# Patient Record
Sex: FEMALE | Race: WHITE | Employment: OTHER | URBAN - METROPOLITAN AREA
[De-identification: names, ages, dates, MRNs, and addresses within clinical notes are randomized per-mention and may not be internally consistent; named-entity substitution may affect disease eponyms.]

---

## 2019-08-28 ENCOUNTER — HOSPITAL ENCOUNTER (OUTPATIENT)
Age: 59
Setting detail: SPECIMEN
Discharge: HOME | End: 2019-08-28
Payer: MEDICARE

## 2019-08-28 LAB
ABSOLUTE EOS #: <0.03 K/UL (ref 0–0.44)
ABSOLUTE IMMATURE GRANULOCYTE: 0.03 K/UL (ref 0–0.3)
ABSOLUTE LYMPH #: 1.56 K/UL (ref 1.1–3.7)
ABSOLUTE MONO #: 0.52 K/UL (ref 0.1–1.2)
ALBUMIN SERPL-MCNC: 4.2 G/DL (ref 3.5–5.2)
ALBUMIN/GLOBULIN RATIO: 1.4 (ref 1–2.5)
ALP BLD-CCNC: 118 U/L (ref 35–104)
ALT SERPL-CCNC: 153 U/L (ref 5–33)
ANION GAP SERPL CALCULATED.3IONS-SCNC: 13 MMOL/L (ref 9–17)
AST SERPL-CCNC: 112 U/L
BASOPHILS # BLD: 1 % (ref 0–2)
BASOPHILS ABSOLUTE: 0.07 K/UL (ref 0–0.2)
BILIRUB SERPL-MCNC: 0.32 MG/DL (ref 0.3–1.2)
BUN BLDV-MCNC: 13 MG/DL (ref 6–20)
BUN/CREAT BLD: ABNORMAL (ref 9–20)
CALCIUM SERPL-MCNC: 8.9 MG/DL (ref 8.6–10.4)
CHLORIDE BLD-SCNC: 104 MMOL/L (ref 98–107)
CO2: 23 MMOL/L (ref 20–31)
CREAT SERPL-MCNC: 0.81 MG/DL (ref 0.5–0.9)
DIFFERENTIAL TYPE: ABNORMAL
EOSINOPHILS RELATIVE PERCENT: 0 % (ref 1–4)
GFR AFRICAN AMERICAN: >60 ML/MIN
GFR NON-AFRICAN AMERICAN: >60 ML/MIN
GFR SERPL CREATININE-BSD FRML MDRD: ABNORMAL ML/MIN/{1.73_M2}
GFR SERPL CREATININE-BSD FRML MDRD: ABNORMAL ML/MIN/{1.73_M2}
GLUCOSE BLD-MCNC: 107 MG/DL (ref 70–99)
HAV IGM SER IA-ACNC: NONREACTIVE
HBV SURFACE AB TITR SER: <3.5 MIU/ML
HCT VFR BLD CALC: 38.1 % (ref 36.3–47.1)
HEMOGLOBIN: 12.1 G/DL (ref 11.9–15.1)
HEPATITIS B CORE IGM ANTIBODY: NONREACTIVE
HEPATITIS B SURFACE ANTIGEN: NONREACTIVE
HEPATITIS C ANTIBODY: REACTIVE
IMMATURE GRANULOCYTES: 1 %
INR BLD: 0.9
LYMPHOCYTES # BLD: 27 % (ref 24–43)
MCH RBC QN AUTO: 29.6 PG (ref 25.2–33.5)
MCHC RBC AUTO-ENTMCNC: 31.8 G/DL (ref 28.4–34.8)
MCV RBC AUTO: 93.2 FL (ref 82.6–102.9)
MONOCYTES # BLD: 9 % (ref 3–12)
NRBC AUTOMATED: 0 PER 100 WBC
PDW BLD-RTO: 12.6 % (ref 11.8–14.4)
PLATELET # BLD: 288 K/UL (ref 138–453)
PLATELET ESTIMATE: ABNORMAL
PMV BLD AUTO: 10.4 FL (ref 8.1–13.5)
POTASSIUM SERPL-SCNC: 4.8 MMOL/L (ref 3.7–5.3)
PROTHROMBIN TIME: 9.9 SEC (ref 9–12)
RBC # BLD: 4.09 M/UL (ref 3.95–5.11)
RBC # BLD: ABNORMAL 10*6/UL
SEG NEUTROPHILS: 62 % (ref 36–65)
SEGMENTED NEUTROPHILS ABSOLUTE COUNT: 3.57 K/UL (ref 1.5–8.1)
SODIUM BLD-SCNC: 140 MMOL/L (ref 135–144)
TOTAL PROTEIN: 7.1 G/DL (ref 6.4–8.3)
TSH SERPL DL<=0.05 MIU/L-ACNC: 1.83 MIU/L (ref 0.3–5)
WBC # BLD: 5.8 K/UL (ref 3.5–11.3)
WBC # BLD: ABNORMAL 10*3/UL

## 2019-08-30 LAB
DIRECT EXAM: ABNORMAL
DIRECT EXAM: ABNORMAL
Lab: ABNORMAL
SPECIMEN DESCRIPTION: ABNORMAL

## 2019-09-01 LAB
ALANINE AMINOTRANSFERASE, FIBROMETER: 165 U/L (ref 5–40)
ALPHA-2-MACROGLOBULIN, FIBROMETER: 234 MG/DL (ref 131–293)
ASPARTATE AMINOTRANSFERASE, FIBROMETER: 117 U/L (ref 9–40)
CIRRHOMETER PATIENT SCORE: 0
EER FIBROMETER REPORT: ABNORMAL
FIBROMETER INTERPRETATION: ABNORMAL
FIBROMETER PATIENT SCORE: 0.43
FIBROMETER PLATELET COUNT: 288
FIBROMETER PROTHROMBIN INDEX: 113 % (ref 90–120)
FIBROSIS METAVIR CLASSIFICATION: ABNORMAL
GAMMA GLUTAMYL TRANSFERASE, FIBROMETER: 66 U/L (ref 7–33)
INFLAMETER METAVIR CLASSIFICATION: ABNORMAL
INFLAMETER PATIENT SCORE: 0.5
UREA NITROGEN, FIBROMETER: 12 MG/DL (ref 7–20)

## 2019-09-03 LAB
HCV QUANTITATIVE: NORMAL
HEPATITIS C GENOTYPE: NORMAL

## 2019-10-22 ENCOUNTER — HOSPITAL ENCOUNTER (OUTPATIENT)
Dept: MRI IMAGING | Age: 59
Discharge: HOME | End: 2019-10-22
Payer: MEDICARE

## 2019-10-22 DIAGNOSIS — M51.26 DISPLACEMENT OF LUMBAR INTERVERTEBRAL DISC WITHOUT MYELOPATHY: ICD-10-CM

## 2019-10-22 PROCEDURE — 72148 MRI LUMBAR SPINE W/O DYE: CPT

## 2019-11-16 ENCOUNTER — APPOINTMENT (OUTPATIENT)
Dept: CT IMAGING | Age: 59
End: 2019-11-16
Payer: MEDICARE

## 2019-11-16 ENCOUNTER — HOSPITAL ENCOUNTER (EMERGENCY)
Age: 59
Discharge: HOME | End: 2019-11-16
Payer: MEDICARE

## 2019-11-16 VITALS
TEMPERATURE: 98.7 F | DIASTOLIC BLOOD PRESSURE: 69 MMHG | WEIGHT: 293 LBS | HEIGHT: 68 IN | BODY MASS INDEX: 44.41 KG/M2 | SYSTOLIC BLOOD PRESSURE: 148 MMHG | OXYGEN SATURATION: 99 % | HEART RATE: 94 BPM | RESPIRATION RATE: 20 BRPM

## 2019-11-16 DIAGNOSIS — G89.29 ACUTE EXACERBATION OF CHRONIC LOW BACK PAIN: Primary | ICD-10-CM

## 2019-11-16 DIAGNOSIS — M51.36 LUMBAR DEGENERATIVE DISC DISEASE: ICD-10-CM

## 2019-11-16 DIAGNOSIS — M54.50 ACUTE EXACERBATION OF CHRONIC LOW BACK PAIN: Primary | ICD-10-CM

## 2019-11-16 PROCEDURE — 99283 EMERGENCY DEPT VISIT LOW MDM: CPT

## 2019-11-16 PROCEDURE — 96372 THER/PROPH/DIAG INJ SC/IM: CPT

## 2019-11-16 PROCEDURE — 6370000000 HC RX 637 (ALT 250 FOR IP): Performed by: PHYSICIAN ASSISTANT

## 2019-11-16 PROCEDURE — 72131 CT LUMBAR SPINE W/O DYE: CPT

## 2019-11-16 PROCEDURE — 6360000002 HC RX W HCPCS: Performed by: PHYSICIAN ASSISTANT

## 2019-11-16 RX ORDER — LIDOCAINE 4 G/G
1 PATCH TOPICAL DAILY
Status: DISCONTINUED | OUTPATIENT
Start: 2019-11-16 | End: 2019-11-16 | Stop reason: HOSPADM

## 2019-11-16 RX ORDER — LIDOCAINE 50 MG/G
1 PATCH TOPICAL DAILY
Qty: 10 PATCH | Refills: 0 | Status: SHIPPED | OUTPATIENT
Start: 2019-11-16 | End: 2019-11-26

## 2019-11-16 RX ORDER — METHYLPREDNISOLONE 4 MG/1
TABLET ORAL
Qty: 1 KIT | Refills: 0 | Status: SHIPPED | OUTPATIENT
Start: 2019-11-16 | End: 2019-11-22

## 2019-11-16 RX ORDER — CYCLOBENZAPRINE HCL 10 MG
10 TABLET ORAL 3 TIMES DAILY PRN
Qty: 15 TABLET | Refills: 0 | Status: SHIPPED | OUTPATIENT
Start: 2019-11-16 | End: 2019-11-21

## 2019-11-16 RX ORDER — KETOROLAC TROMETHAMINE 30 MG/ML
30 INJECTION, SOLUTION INTRAMUSCULAR; INTRAVENOUS ONCE
Status: COMPLETED | OUTPATIENT
Start: 2019-11-16 | End: 2019-11-16

## 2019-11-16 RX ORDER — NAPROXEN 500 MG/1
500 TABLET ORAL 2 TIMES DAILY WITH MEALS
Qty: 60 TABLET | Refills: 3 | Status: SHIPPED | OUTPATIENT
Start: 2019-11-16 | End: 2019-12-29

## 2019-11-16 RX ORDER — CYCLOBENZAPRINE HCL 10 MG
10 TABLET ORAL ONCE
Status: COMPLETED | OUTPATIENT
Start: 2019-11-16 | End: 2019-11-16

## 2019-11-16 RX ORDER — METHYLPREDNISOLONE SODIUM SUCCINATE 125 MG/2ML
80 INJECTION, POWDER, LYOPHILIZED, FOR SOLUTION INTRAMUSCULAR; INTRAVENOUS ONCE
Status: COMPLETED | OUTPATIENT
Start: 2019-11-16 | End: 2019-11-16

## 2019-11-16 RX ADMIN — CYCLOBENZAPRINE 10 MG: 10 TABLET, FILM COATED ORAL at 19:47

## 2019-11-16 RX ADMIN — KETOROLAC TROMETHAMINE 30 MG: 30 INJECTION, SOLUTION INTRAMUSCULAR at 19:48

## 2019-11-16 RX ADMIN — METHYLPREDNISOLONE SODIUM SUCCINATE 80 MG: 125 INJECTION, POWDER, FOR SOLUTION INTRAMUSCULAR; INTRAVENOUS at 19:47

## 2019-11-16 ASSESSMENT — ENCOUNTER SYMPTOMS
BACK PAIN: 1
CONSTIPATION: 0
ABDOMINAL PAIN: 0
DIARRHEA: 0
COLOR CHANGE: 0
NAUSEA: 0
VOMITING: 0
SHORTNESS OF BREATH: 0

## 2019-11-16 ASSESSMENT — PAIN DESCRIPTION - LOCATION: LOCATION: BACK;LEG

## 2019-11-16 ASSESSMENT — PAIN SCALES - GENERAL
PAINLEVEL_OUTOF10: 4
PAINLEVEL_OUTOF10: 5

## 2019-11-16 ASSESSMENT — PAIN DESCRIPTION - PAIN TYPE: TYPE: CHRONIC PAIN

## 2019-11-16 ASSESSMENT — PAIN DESCRIPTION - FREQUENCY: FREQUENCY: INTERMITTENT

## 2019-11-20 ENCOUNTER — HOSPITAL ENCOUNTER (OUTPATIENT)
Age: 59
Setting detail: SPECIMEN
Discharge: HOME | End: 2019-11-20
Payer: MEDICARE

## 2019-11-21 LAB
CHLAMYDIA BY THIN PREP: NEGATIVE
N. GONORRHOEAE DNA, THIN PREP: NEGATIVE
SPECIMEN DESCRIPTION: NORMAL

## 2019-11-22 LAB
HPV SAMPLE: ABNORMAL
HPV, GENOTYPE 16: NOT DETECTED
HPV, GENOTYPE 18: NOT DETECTED
HPV, HIGH RISK OTHER: DETECTED
HPV, INTERPRETATION: ABNORMAL
SPECIMEN DESCRIPTION: ABNORMAL

## 2019-11-25 ENCOUNTER — HOSPITAL ENCOUNTER (EMERGENCY)
Age: 59
Discharge: HOME | End: 2019-11-25
Payer: MEDICARE

## 2019-11-25 VITALS
WEIGHT: 293 LBS | DIASTOLIC BLOOD PRESSURE: 81 MMHG | RESPIRATION RATE: 20 BRPM | HEART RATE: 89 BPM | OXYGEN SATURATION: 98 % | SYSTOLIC BLOOD PRESSURE: 154 MMHG | BODY MASS INDEX: 44.41 KG/M2 | HEIGHT: 68 IN | TEMPERATURE: 96.2 F

## 2019-11-25 DIAGNOSIS — K08.89 ODONTALGIA: Primary | ICD-10-CM

## 2019-11-25 DIAGNOSIS — K02.9 DENTAL DECAY: ICD-10-CM

## 2019-11-25 DIAGNOSIS — K02.9 DENTAL CARIES: ICD-10-CM

## 2019-11-25 PROCEDURE — 99282 EMERGENCY DEPT VISIT SF MDM: CPT

## 2019-11-25 PROCEDURE — 6370000000 HC RX 637 (ALT 250 FOR IP): Performed by: EMERGENCY MEDICINE

## 2019-11-25 RX ORDER — IBUPROFEN 200 MG
600 TABLET ORAL ONCE
Status: COMPLETED | OUTPATIENT
Start: 2019-11-25 | End: 2019-11-25

## 2019-11-25 RX ORDER — LIDOCAINE HYDROCHLORIDE 20 MG/ML
5 SOLUTION OROPHARYNGEAL ONCE
Status: COMPLETED | OUTPATIENT
Start: 2019-11-25 | End: 2019-11-25

## 2019-11-25 RX ORDER — IBUPROFEN 600 MG/1
600 TABLET ORAL 3 TIMES DAILY PRN
Qty: 30 TABLET | Refills: 0 | Status: ON HOLD | OUTPATIENT
Start: 2019-11-25 | End: 2020-01-01

## 2019-11-25 RX ORDER — LIDOCAINE HYDROCHLORIDE 20 MG/ML
5 SOLUTION OROPHARYNGEAL
Qty: 100 ML | Refills: 0 | Status: ON HOLD | OUTPATIENT
Start: 2019-11-25 | End: 2020-01-03 | Stop reason: HOSPADM

## 2019-11-25 RX ORDER — PENICILLIN V POTASSIUM 500 MG/1
500 TABLET ORAL 4 TIMES DAILY
Qty: 28 TABLET | Refills: 0 | Status: SHIPPED | OUTPATIENT
Start: 2019-11-25 | End: 2019-12-02

## 2019-11-25 RX ADMIN — Medication 600 MG: at 11:50

## 2019-11-25 RX ADMIN — LIDOCAINE HYDROCHLORIDE 5 ML: 20 SOLUTION ORAL; TOPICAL at 11:50

## 2019-11-25 ASSESSMENT — PAIN DESCRIPTION - ORIENTATION: ORIENTATION: ANTERIOR;UPPER

## 2019-11-25 ASSESSMENT — ENCOUNTER SYMPTOMS
SORE THROAT: 0
RHINORRHEA: 0
DIARRHEA: 0
BACK PAIN: 0
ABDOMINAL PAIN: 0
VOMITING: 0
EYE DISCHARGE: 0
COUGH: 0
EYE PAIN: 0
WHEEZING: 0
NAUSEA: 0
SHORTNESS OF BREATH: 0

## 2019-11-25 ASSESSMENT — PAIN SCALES - GENERAL
PAINLEVEL_OUTOF10: 6
PAINLEVEL_OUTOF10: 6

## 2019-11-25 ASSESSMENT — PAIN DESCRIPTION - FREQUENCY: FREQUENCY: INTERMITTENT

## 2019-11-25 ASSESSMENT — PAIN DESCRIPTION - PAIN TYPE: TYPE: ACUTE PAIN

## 2019-11-25 ASSESSMENT — PAIN DESCRIPTION - LOCATION: LOCATION: TEETH

## 2019-11-25 ASSESSMENT — PAIN DESCRIPTION - DESCRIPTORS: DESCRIPTORS: SHOOTING;SHARP

## 2019-11-25 ASSESSMENT — PAIN DESCRIPTION - ONSET: ONSET: SUDDEN

## 2019-11-27 LAB — CYTOLOGY REPORT: NORMAL

## 2019-12-29 ENCOUNTER — APPOINTMENT (OUTPATIENT)
Dept: GENERAL RADIOLOGY | Age: 59
DRG: 750 | End: 2019-12-29
Payer: MEDICARE

## 2019-12-29 ENCOUNTER — HOSPITAL ENCOUNTER (EMERGENCY)
Age: 59
Discharge: HOME | DRG: 750 | End: 2019-12-30
Attending: EMERGENCY MEDICINE
Payer: MEDICARE

## 2019-12-29 LAB
ALBUMIN SERPL-MCNC: 4.1 G/DL (ref 3.5–5.1)
ALP BLD-CCNC: 113 U/L (ref 38–126)
ALT SERPL-CCNC: 109 U/L (ref 11–66)
AMPHETAMINE+METHAMPHETAMINE URINE SCREEN: NEGATIVE
ANION GAP SERPL CALCULATED.3IONS-SCNC: 13 MEQ/L (ref 8–16)
AST SERPL-CCNC: 57 U/L (ref 5–40)
BACTERIA: ABNORMAL /HPF
BARBITURATE QUANTITATIVE URINE: NEGATIVE
BASOPHILS # BLD: 0.9 %
BASOPHILS ABSOLUTE: 0.1 THOU/MM3 (ref 0–0.1)
BENZODIAZEPINE QUANTITATIVE URINE: NEGATIVE
BILIRUB SERPL-MCNC: 0.4 MG/DL (ref 0.3–1.2)
BILIRUBIN DIRECT: < 0.2 MG/DL (ref 0–0.3)
BILIRUBIN URINE: ABNORMAL
BLOOD, URINE: ABNORMAL
BUN BLDV-MCNC: 30 MG/DL (ref 7–22)
CALCIUM SERPL-MCNC: 9.3 MG/DL (ref 8.5–10.5)
CANNABINOID QUANTITATIVE URINE: NEGATIVE
CASTS 2: ABNORMAL /LPF
CASTS UA: ABNORMAL /LPF
CHARACTER, URINE: CLEAR
CHLORIDE BLD-SCNC: 102 MEQ/L (ref 98–111)
CO2: 21 MEQ/L (ref 23–33)
COCAINE METABOLITE QUANTITATIVE URINE: NEGATIVE
COLOR: ABNORMAL
CREAT SERPL-MCNC: 1 MG/DL (ref 0.4–1.2)
CRYSTALS, UA: ABNORMAL
EKG ATRIAL RATE: 109 BPM
EKG P AXIS: 2 DEGREES
EKG P-R INTERVAL: 146 MS
EKG Q-T INTERVAL: 340 MS
EKG QRS DURATION: 80 MS
EKG QTC CALCULATION (BAZETT): 457 MS
EKG R AXIS: 38 DEGREES
EKG T AXIS: 50 DEGREES
EKG VENTRICULAR RATE: 109 BPM
EOSINOPHIL # BLD: 0.1 %
EOSINOPHILS ABSOLUTE: 0 THOU/MM3 (ref 0–0.4)
EPITHELIAL CELLS, UA: ABNORMAL /HPF
ERYTHROCYTE [DISTWIDTH] IN BLOOD BY AUTOMATED COUNT: 12.2 % (ref 11.5–14.5)
ERYTHROCYTE [DISTWIDTH] IN BLOOD BY AUTOMATED COUNT: 38.7 FL (ref 35–45)
ETHYL ALCOHOL, SERUM: < 0.01 %
GFR SERPL CREATININE-BSD FRML MDRD: 57 ML/MIN/1.73M2
GLUCOSE BLD-MCNC: 157 MG/DL (ref 70–108)
GLUCOSE URINE: NEGATIVE MG/DL
HCT VFR BLD CALC: 41.1 % (ref 37–47)
HEMOGLOBIN: 14.1 GM/DL (ref 12–16)
ICTOTEST: NEGATIVE
IMMATURE GRANS (ABS): 0.04 THOU/MM3 (ref 0–0.07)
IMMATURE GRANULOCYTES: 0.4 %
KETONES, URINE: NEGATIVE
LEUKOCYTE ESTERASE, URINE: ABNORMAL
LIPASE: 25.6 U/L (ref 5.6–51.3)
LYMPHOCYTES # BLD: 33.4 %
LYMPHOCYTES ABSOLUTE: 3.6 THOU/MM3 (ref 1–4.8)
MAGNESIUM: 1.9 MG/DL (ref 1.6–2.4)
MCH RBC QN AUTO: 29.6 PG (ref 26–33)
MCHC RBC AUTO-ENTMCNC: 34.3 GM/DL (ref 32.2–35.5)
MCV RBC AUTO: 86.3 FL (ref 81–99)
MISCELLANEOUS 2: ABNORMAL
MONOCYTES # BLD: 8.3 %
MONOCYTES ABSOLUTE: 0.9 THOU/MM3 (ref 0.4–1.3)
NITRITE, URINE: NEGATIVE
NUCLEATED RED BLOOD CELLS: 0 /100 WBC
OPIATES, URINE: NEGATIVE
OSMOLALITY CALCULATION: 281.4 MOSMOL/KG (ref 275–300)
OXYCODONE: NEGATIVE
PH UA: 5 (ref 5–9)
PHENCYCLIDINE QUANTITATIVE URINE: NEGATIVE
PLATELET # BLD: 345 THOU/MM3 (ref 130–400)
PMV BLD AUTO: 9.8 FL (ref 9.4–12.4)
POTASSIUM SERPL-SCNC: 4 MEQ/L (ref 3.5–5.2)
PROTEIN UA: NEGATIVE
RBC # BLD: 4.76 MILL/MM3 (ref 4.2–5.4)
RBC URINE: ABNORMAL /HPF
RENAL EPITHELIAL, UA: ABNORMAL
SEG NEUTROPHILS: 56.9 %
SEGMENTED NEUTROPHILS ABSOLUTE COUNT: 6.2 THOU/MM3 (ref 1.8–7.7)
SODIUM BLD-SCNC: 136 MEQ/L (ref 135–145)
SPECIFIC GRAVITY, URINE: 1.03 (ref 1–1.03)
TOTAL PROTEIN: 8.1 G/DL (ref 6.1–8)
TROPONIN T: < 0.01 NG/ML
TROPONIN T: < 0.01 NG/ML
UROBILINOGEN, URINE: 1 EU/DL (ref 0–1)
WBC # BLD: 10.9 THOU/MM3 (ref 4.8–10.8)
WBC UA: ABNORMAL /HPF
YEAST: ABNORMAL

## 2019-12-29 PROCEDURE — 96374 THER/PROPH/DIAG INJ IV PUSH: CPT

## 2019-12-29 PROCEDURE — 6370000000 HC RX 637 (ALT 250 FOR IP): Performed by: EMERGENCY MEDICINE

## 2019-12-29 PROCEDURE — 87086 URINE CULTURE/COLONY COUNT: CPT

## 2019-12-29 PROCEDURE — 80053 COMPREHEN METABOLIC PANEL: CPT

## 2019-12-29 PROCEDURE — 82248 BILIRUBIN DIRECT: CPT

## 2019-12-29 PROCEDURE — 84484 ASSAY OF TROPONIN QUANT: CPT

## 2019-12-29 PROCEDURE — 6360000002 HC RX W HCPCS: Performed by: EMERGENCY MEDICINE

## 2019-12-29 PROCEDURE — 85025 COMPLETE CBC W/AUTO DIFF WBC: CPT

## 2019-12-29 PROCEDURE — 93005 ELECTROCARDIOGRAM TRACING: CPT | Performed by: EMERGENCY MEDICINE

## 2019-12-29 PROCEDURE — 36415 COLL VENOUS BLD VENIPUNCTURE: CPT

## 2019-12-29 PROCEDURE — 71045 X-RAY EXAM CHEST 1 VIEW: CPT

## 2019-12-29 PROCEDURE — 83735 ASSAY OF MAGNESIUM: CPT

## 2019-12-29 PROCEDURE — G0480 DRUG TEST DEF 1-7 CLASSES: HCPCS

## 2019-12-29 PROCEDURE — 80307 DRUG TEST PRSMV CHEM ANLYZR: CPT

## 2019-12-29 PROCEDURE — 99285 EMERGENCY DEPT VISIT HI MDM: CPT

## 2019-12-29 PROCEDURE — 83690 ASSAY OF LIPASE: CPT

## 2019-12-29 PROCEDURE — 81001 URINALYSIS AUTO W/SCOPE: CPT

## 2019-12-29 RX ORDER — PANTOPRAZOLE SODIUM 40 MG/1
40 TABLET, DELAYED RELEASE ORAL ONCE
Status: COMPLETED | OUTPATIENT
Start: 2019-12-29 | End: 2019-12-29

## 2019-12-29 RX ORDER — HYDROXYZINE HYDROCHLORIDE 50 MG/ML
50 INJECTION, SOLUTION INTRAMUSCULAR ONCE
Status: COMPLETED | OUTPATIENT
Start: 2019-12-29 | End: 2019-12-29

## 2019-12-29 RX ORDER — ONDANSETRON 4 MG/1
4 TABLET, ORALLY DISINTEGRATING ORAL ONCE
Status: COMPLETED | OUTPATIENT
Start: 2019-12-29 | End: 2019-12-29

## 2019-12-29 RX ORDER — GRANULES FOR ORAL 3 G/1
3 POWDER ORAL ONCE
Status: COMPLETED | OUTPATIENT
Start: 2019-12-29 | End: 2019-12-30

## 2019-12-29 RX ADMIN — LIDOCAINE HYDROCHLORIDE: 20 SOLUTION ORAL; TOPICAL at 22:00

## 2019-12-29 RX ADMIN — HYDROXYZINE HYDROCHLORIDE 50 MG: 50 INJECTION, SOLUTION INTRAMUSCULAR at 22:37

## 2019-12-29 RX ADMIN — PANTOPRAZOLE SODIUM 40 MG: 40 TABLET, DELAYED RELEASE ORAL at 22:00

## 2019-12-29 RX ADMIN — ONDANSETRON 4 MG: 4 TABLET, ORALLY DISINTEGRATING ORAL at 22:00

## 2019-12-29 ASSESSMENT — ENCOUNTER SYMPTOMS
CHEST TIGHTNESS: 1
EYE PAIN: 0
EYE ITCHING: 0
SINUS PRESSURE: 0
ABDOMINAL DISTENTION: 0
SORE THROAT: 0
VOICE CHANGE: 0
TROUBLE SWALLOWING: 0
EYE REDNESS: 0
DIARRHEA: 0
WHEEZING: 0
CONSTIPATION: 0
CHOKING: 0
BACK PAIN: 0
RHINORRHEA: 0
BLOOD IN STOOL: 0
NAUSEA: 0
PHOTOPHOBIA: 0
EYE DISCHARGE: 0
COUGH: 0
ABDOMINAL PAIN: 0
SHORTNESS OF BREATH: 0
VOMITING: 0

## 2019-12-29 ASSESSMENT — PAIN SCALES - GENERAL: PAINLEVEL_OUTOF10: 6

## 2019-12-29 ASSESSMENT — PAIN DESCRIPTION - DESCRIPTORS: DESCRIPTORS: PRESSURE

## 2019-12-30 VITALS
WEIGHT: 293 LBS | HEART RATE: 88 BPM | SYSTOLIC BLOOD PRESSURE: 125 MMHG | TEMPERATURE: 97.7 F | RESPIRATION RATE: 18 BRPM | BODY MASS INDEX: 44.41 KG/M2 | OXYGEN SATURATION: 96 % | HEIGHT: 68 IN | DIASTOLIC BLOOD PRESSURE: 59 MMHG

## 2019-12-30 PROCEDURE — 6370000000 HC RX 637 (ALT 250 FOR IP): Performed by: EMERGENCY MEDICINE

## 2019-12-30 RX ORDER — CLOTRIMAZOLE AND BETAMETHASONE DIPROPIONATE 10; .64 MG/G; MG/G
CREAM TOPICAL
Qty: 1 TUBE | Refills: 0 | Status: ON HOLD | OUTPATIENT
Start: 2019-12-30 | End: 2020-01-03 | Stop reason: HOSPADM

## 2019-12-30 RX ORDER — HYDROXYZINE PAMOATE 25 MG/1
25 CAPSULE ORAL 3 TIMES DAILY PRN
Qty: 30 CAPSULE | Refills: 0 | Status: ON HOLD | OUTPATIENT
Start: 2019-12-30 | End: 2020-01-03 | Stop reason: HOSPADM

## 2019-12-30 RX ADMIN — FOSFOMYCIN TROMETHAMINE 1 PACKET: 3 POWDER ORAL at 00:14

## 2019-12-30 NOTE — ED PROVIDER NOTES
[facility]  eMERGENCY dEPARTMENT eNCOUnter          CHIEF COMPLAINT       Chief Complaint   Patient presents with    Chest Pain       Nurses Notes reviewed and I agree except as noted in the HPI. HISTORY OF PRESENT ILLNESS    Devan Richardson is a 64 y.o. female who presents to the Emergency Department for the evaluation of chest pain and trouble breathing. The patient states an onset of 1 hour and states that it came on very suddenly. She reports that she was walking to her car when she felt a sharp pain in her left arm that moved to her chest. She reports that it feels like something is sitting on her chest. Patient does report a history of anxiety. She is a recovering fentanyl addict and alcoholic. She reports that she is currently 6 months sober. She denies any history of heart issues, and denies any fevers, chills, numbness or weakness. The patient denies any other symptoms or relevant history at this time. The HPI was provided by the patient. REVIEW OF SYSTEMS      Review of Systems   Constitutional: Negative for activity change, appetite change, diaphoresis, fatigue and unexpected weight change. HENT: Negative for congestion, ear discharge, ear pain, hearing loss, rhinorrhea, sinus pressure, sore throat, trouble swallowing and voice change. Eyes: Negative for photophobia, pain, discharge, redness and itching. Respiratory: Positive for chest tightness. Negative for cough, choking, shortness of breath and wheezing. Cardiovascular: Positive for chest pain. Negative for palpitations and leg swelling. Gastrointestinal: Negative for abdominal distention, abdominal pain, blood in stool, constipation, diarrhea, nausea and vomiting. Endocrine: Negative for polydipsia, polyphagia and polyuria. Genitourinary: Negative for decreased urine volume, difficulty urinating, dysuria, enuresis, frequency, hematuria and urgency.    Musculoskeletal: Positive for myalgias (left shoulder and arm). Negative for arthralgias, back pain, gait problem, neck pain and neck stiffness. Skin: Negative for pallor and rash. Allergic/Immunologic: Negative for immunocompromised state. Neurological: Negative for dizziness, tremors, seizures, syncope, facial asymmetry, weakness, light-headedness, numbness and headaches. Hematological: Negative for adenopathy. Does not bruise/bleed easily. Psychiatric/Behavioral: Negative for agitation, hallucinations and suicidal ideas. The patient is not nervous/anxious. PAST MEDICAL HISTORY    has a past medical history of Anxiety, Chronic low back pain, Depression, Hypertension, Schizoaffective disorder ([facility] [address] and Schizoaffective disorder ([facility] [address] SURGICAL HISTORY      has a past surgical history that includes back surgery (nov 2013); Tubal ligation; Tonsillectomy; Ankle surgery; and cyst removal.    CURRENT MEDICATIONS       Discharge Medication List as of 12/30/2019 12:11 AM      CONTINUE these medications which have NOT CHANGED    Details   ibuprofen (ADVIL;MOTRIN) 600 MG tablet Take 1 tablet by mouth 3 times daily as needed for Pain, Disp-30 tablet, R-0Print      lidocaine viscous hcl (XYLOCAINE) 2 % SOLN solution Take 5 mLs by mouth every 3 hours as needed for Irritation or Dental Pain, Disp-100 mL, R-0Print      FLUoxetine (PROZAC) 40 MG capsule Take 1 capsule by mouth daily, Disp-30 capsule, R-0      paliperidone (INVEGA) 6 MG ER tablet Take 1 tablet by mouth daily, Disp-30 tablet, R-0      amLODIPine (NORVASC) 5 MG tablet Take 1 tablet by mouth daily, Disp-30 tablet, R-0             ALLERGIES     is allergic to codeine and corn starch. FAMILY HISTORY     She indicated that the status of her mother is unknown. She indicated that the status of her father is unknown.   family history includes Diabetes in her mother; Heart Disease in her father. SOCIAL HISTORY    reports that she has been smoking e-cigarettes.  She has been smoking about 0.00 packs

## 2019-12-30 NOTE — ED NOTES
Pt resting in bed on her left side. VS reassessed. Call light in reach. Respirations regular. Pt states vistaril helped.  Will continue to monitor      Kai Naranjo RN  12/29/19 [id number]

## 2019-12-30 NOTE — ED TRIAGE NOTES
Pt comes to ED with c/o chest pain. Pt states this started an hour ago. Pt states that it feels like pressure. Pt states it happened after an AA meeting. Pt has a hx of anxiety. Pt states it is hard to breathe. Pt is alert and pt states it feels different than normal. Pt states she felt pain gong down her left arm.

## 2019-12-31 LAB
ORGANISM: ABNORMAL
URINE CULTURE REFLEX: ABNORMAL

## 2020-01-01 ENCOUNTER — APPOINTMENT (OUTPATIENT)
Dept: CT IMAGING | Age: 60
DRG: 750 | End: 2020-01-01
Payer: MEDICARE

## 2020-01-01 ENCOUNTER — HOSPITAL ENCOUNTER (INPATIENT)
Age: 60
LOS: 2 days | Discharge: HOME | DRG: 750 | End: 2020-01-03
Attending: EMERGENCY MEDICINE | Admitting: PSYCHIATRY & NEUROLOGY
Payer: MEDICARE

## 2020-01-01 PROBLEM — F33.2 MDD (MAJOR DEPRESSIVE DISORDER), RECURRENT SEVERE, WITHOUT PSYCHOSIS (HCC): Status: ACTIVE | Noted: 2020-01-01

## 2020-01-01 LAB
ACETAMINOPHEN LEVEL: < 5 UG/ML (ref 0–20)
ALBUMIN SERPL-MCNC: 4.1 G/DL (ref 3.5–5.1)
ALP BLD-CCNC: 95 U/L (ref 38–126)
ALT SERPL-CCNC: 95 U/L (ref 11–66)
AMPHETAMINE+METHAMPHETAMINE URINE SCREEN: NEGATIVE
ANION GAP SERPL CALCULATED.3IONS-SCNC: 15 MEQ/L (ref 8–16)
AST SERPL-CCNC: 71 U/L (ref 5–40)
BACTERIA: ABNORMAL /HPF
BARBITURATE QUANTITATIVE URINE: NEGATIVE
BASOPHILS # BLD: 0.8 %
BASOPHILS ABSOLUTE: 0.1 THOU/MM3 (ref 0–0.1)
BENZODIAZEPINE QUANTITATIVE URINE: NEGATIVE
BILIRUB SERPL-MCNC: 0.7 MG/DL (ref 0.3–1.2)
BILIRUBIN DIRECT: < 0.2 MG/DL (ref 0–0.3)
BILIRUBIN URINE: NEGATIVE
BLOOD, URINE: NEGATIVE
BUN BLDV-MCNC: 27 MG/DL (ref 7–22)
CALCIUM SERPL-MCNC: 9.4 MG/DL (ref 8.5–10.5)
CANNABINOID QUANTITATIVE URINE: NEGATIVE
CASTS 2: ABNORMAL /LPF
CASTS UA: ABNORMAL /LPF
CHARACTER, URINE: CLEAR
CHLORIDE BLD-SCNC: 105 MEQ/L (ref 98–111)
CO2: 19 MEQ/L (ref 23–33)
COCAINE METABOLITE QUANTITATIVE URINE: NEGATIVE
COLOR: YELLOW
CREAT SERPL-MCNC: 0.9 MG/DL (ref 0.4–1.2)
CRYSTALS, UA: ABNORMAL
EKG ATRIAL RATE: 88 BPM
EKG P AXIS: 7 DEGREES
EKG P-R INTERVAL: 150 MS
EKG Q-T INTERVAL: 396 MS
EKG QRS DURATION: 88 MS
EKG QTC CALCULATION (BAZETT): 479 MS
EKG R AXIS: 48 DEGREES
EKG T AXIS: 44 DEGREES
EKG VENTRICULAR RATE: 88 BPM
EOSINOPHIL # BLD: 1.1 %
EOSINOPHILS ABSOLUTE: 0.1 THOU/MM3 (ref 0–0.4)
EPITHELIAL CELLS, UA: ABNORMAL /HPF
ERYTHROCYTE [DISTWIDTH] IN BLOOD BY AUTOMATED COUNT: 12.2 % (ref 11.5–14.5)
ERYTHROCYTE [DISTWIDTH] IN BLOOD BY AUTOMATED COUNT: 38.5 FL (ref 35–45)
ETHYL ALCOHOL, SERUM: < 0.01 %
GFR SERPL CREATININE-BSD FRML MDRD: 64 ML/MIN/1.73M2
GLUCOSE BLD-MCNC: 142 MG/DL (ref 70–108)
GLUCOSE URINE: NEGATIVE MG/DL
HCT VFR BLD CALC: 37.9 % (ref 37–47)
HEMOGLOBIN: 12.8 GM/DL (ref 12–16)
IMMATURE GRANS (ABS): 0.04 THOU/MM3 (ref 0–0.07)
IMMATURE GRANULOCYTES: 0.4 %
KETONES, URINE: NEGATIVE
LEUKOCYTE ESTERASE, URINE: ABNORMAL
LIPASE: 16.8 U/L (ref 5.6–51.3)
LYMPHOCYTES # BLD: 20.7 %
LYMPHOCYTES ABSOLUTE: 2.2 THOU/MM3 (ref 1–4.8)
MAGNESIUM: 2.4 MG/DL (ref 1.6–2.4)
MCH RBC QN AUTO: 29 PG (ref 26–33)
MCHC RBC AUTO-ENTMCNC: 33.8 GM/DL (ref 32.2–35.5)
MCV RBC AUTO: 85.9 FL (ref 81–99)
MISCELLANEOUS 2: ABNORMAL
MONOCYTES # BLD: 7.5 %
MONOCYTES ABSOLUTE: 0.8 THOU/MM3 (ref 0.4–1.3)
NITRITE, URINE: NEGATIVE
NUCLEATED RED BLOOD CELLS: 0 /100 WBC
OPIATES, URINE: NEGATIVE
OSMOLALITY CALCULATION: 285.1 MOSMOL/KG (ref 275–300)
OXYCODONE: NEGATIVE
PH UA: 5 (ref 5–9)
PHENCYCLIDINE QUANTITATIVE URINE: NEGATIVE
PLATELET # BLD: 309 THOU/MM3 (ref 130–400)
PMV BLD AUTO: 10 FL (ref 9.4–12.4)
POTASSIUM SERPL-SCNC: 4.3 MEQ/L (ref 3.5–5.2)
PREGNANCY, SERUM: NEGATIVE
PROTEIN UA: NEGATIVE
RBC # BLD: 4.41 MILL/MM3 (ref 4.2–5.4)
RBC URINE: ABNORMAL /HPF
RENAL EPITHELIAL, UA: ABNORMAL
SALICYLATE, SERUM: < 0.3 MG/DL (ref 2–10)
SEG NEUTROPHILS: 69.5 %
SEGMENTED NEUTROPHILS ABSOLUTE COUNT: 7.4 THOU/MM3 (ref 1.8–7.7)
SODIUM BLD-SCNC: 139 MEQ/L (ref 135–145)
SPECIFIC GRAVITY, URINE: 1.01 (ref 1–1.03)
TOTAL PROTEIN: 8.2 G/DL (ref 6.1–8)
TSH SERPL DL<=0.05 MIU/L-ACNC: 3.57 UIU/ML (ref 0.4–4.2)
UROBILINOGEN, URINE: 0.2 EU/DL (ref 0–1)
WBC # BLD: 10.6 THOU/MM3 (ref 4.8–10.8)
WBC UA: ABNORMAL /HPF
YEAST: ABNORMAL

## 2020-01-01 PROCEDURE — G0480 DRUG TEST DEF 1-7 CLASSES: HCPCS

## 2020-01-01 PROCEDURE — 99285 EMERGENCY DEPT VISIT HI MDM: CPT

## 2020-01-01 PROCEDURE — 90792 PSYCH DIAG EVAL W/MED SRVCS: CPT | Performed by: PSYCHIATRY & NEUROLOGY

## 2020-01-01 PROCEDURE — 6370000000 HC RX 637 (ALT 250 FOR IP): Performed by: PHYSICIAN ASSISTANT

## 2020-01-01 PROCEDURE — 83735 ASSAY OF MAGNESIUM: CPT

## 2020-01-01 PROCEDURE — 80307 DRUG TEST PRSMV CHEM ANLYZR: CPT

## 2020-01-01 PROCEDURE — 36415 COLL VENOUS BLD VENIPUNCTURE: CPT

## 2020-01-01 PROCEDURE — 82248 BILIRUBIN DIRECT: CPT

## 2020-01-01 PROCEDURE — 85025 COMPLETE CBC W/AUTO DIFF WBC: CPT

## 2020-01-01 PROCEDURE — 81001 URINALYSIS AUTO W/SCOPE: CPT

## 2020-01-01 PROCEDURE — 84703 CHORIONIC GONADOTROPIN ASSAY: CPT

## 2020-01-01 PROCEDURE — 80053 COMPREHEN METABOLIC PANEL: CPT

## 2020-01-01 PROCEDURE — 93010 ELECTROCARDIOGRAM REPORT: CPT | Performed by: INTERNAL MEDICINE

## 2020-01-01 PROCEDURE — 6370000000 HC RX 637 (ALT 250 FOR IP): Performed by: EMERGENCY MEDICINE

## 2020-01-01 PROCEDURE — 84443 ASSAY THYROID STIM HORMONE: CPT

## 2020-01-01 PROCEDURE — 1240000000 HC EMOTIONAL WELLNESS R&B

## 2020-01-01 PROCEDURE — 6370000000 HC RX 637 (ALT 250 FOR IP): Performed by: PSYCHIATRY & NEUROLOGY

## 2020-01-01 PROCEDURE — 74176 CT ABD & PELVIS W/O CONTRAST: CPT

## 2020-01-01 PROCEDURE — 93005 ELECTROCARDIOGRAM TRACING: CPT | Performed by: EMERGENCY MEDICINE

## 2020-01-01 PROCEDURE — 83690 ASSAY OF LIPASE: CPT

## 2020-01-01 PROCEDURE — 87086 URINE CULTURE/COLONY COUNT: CPT

## 2020-01-01 RX ORDER — NICOTINE 21 MG/24HR
1 PATCH, TRANSDERMAL 24 HOURS TRANSDERMAL DAILY
Status: DISCONTINUED | OUTPATIENT
Start: 2020-01-01 | End: 2020-01-03 | Stop reason: HOSPADM

## 2020-01-01 RX ORDER — IBUPROFEN 400 MG/1
400 TABLET ORAL EVERY 6 HOURS PRN
Status: DISCONTINUED | OUTPATIENT
Start: 2020-01-01 | End: 2020-01-03 | Stop reason: HOSPADM

## 2020-01-01 RX ORDER — QUETIAPINE FUMARATE 100 MG/1
200 TABLET, FILM COATED ORAL NIGHTLY
Status: ON HOLD | COMMUNITY
End: 2020-01-03 | Stop reason: HOSPADM

## 2020-01-01 RX ORDER — HYDROXYZINE HYDROCHLORIDE 25 MG/1
50 TABLET, FILM COATED ORAL 3 TIMES DAILY PRN
Status: DISCONTINUED | OUTPATIENT
Start: 2020-01-01 | End: 2020-01-03 | Stop reason: HOSPADM

## 2020-01-01 RX ORDER — PANTOPRAZOLE SODIUM 40 MG/1
40 TABLET, DELAYED RELEASE ORAL ONCE
Status: COMPLETED | OUTPATIENT
Start: 2020-01-01 | End: 2020-01-01

## 2020-01-01 RX ORDER — LACTULOSE 10 G/15ML
30 SOLUTION ORAL ONCE
Status: COMPLETED | OUTPATIENT
Start: 2020-01-01 | End: 2020-01-01

## 2020-01-01 RX ORDER — CLONIDINE HYDROCHLORIDE 0.3 MG/1
0.3 TABLET ORAL 2 TIMES DAILY
Status: ON HOLD | COMMUNITY
End: 2020-01-03 | Stop reason: HOSPADM

## 2020-01-01 RX ORDER — NAPROXEN 500 MG/1
500 TABLET ORAL 2 TIMES DAILY WITH MEALS
COMMUNITY
End: 2020-02-09

## 2020-01-01 RX ORDER — DULOXETIN HYDROCHLORIDE 60 MG/1
60 CAPSULE, DELAYED RELEASE ORAL DAILY
Status: DISCONTINUED | OUTPATIENT
Start: 2020-01-01 | End: 2020-01-02

## 2020-01-01 RX ORDER — PALIPERIDONE 3 MG/1
6 TABLET, EXTENDED RELEASE ORAL DAILY
Status: DISCONTINUED | OUTPATIENT
Start: 2020-01-01 | End: 2020-01-03

## 2020-01-01 RX ORDER — ALBUTEROL SULFATE 90 UG/1
1 AEROSOL, METERED RESPIRATORY (INHALATION) EVERY 6 HOURS PRN
Status: DISCONTINUED | OUTPATIENT
Start: 2020-01-01 | End: 2020-01-03 | Stop reason: HOSPADM

## 2020-01-01 RX ORDER — QUETIAPINE FUMARATE 100 MG/1
200 TABLET, FILM COATED ORAL NIGHTLY
Status: DISCONTINUED | OUTPATIENT
Start: 2020-01-01 | End: 2020-01-03

## 2020-01-01 RX ORDER — NORTRIPTYLINE HYDROCHLORIDE 25 MG/1
50 CAPSULE ORAL NIGHTLY
Status: DISCONTINUED | OUTPATIENT
Start: 2020-01-01 | End: 2020-01-03

## 2020-01-01 RX ORDER — CHLORPROMAZINE HYDROCHLORIDE 25 MG/1
25 TABLET, FILM COATED ORAL 2 TIMES DAILY
Status: DISCONTINUED | OUTPATIENT
Start: 2020-01-01 | End: 2020-01-03

## 2020-01-01 RX ORDER — AMLODIPINE BESYLATE 5 MG/1
5 TABLET ORAL DAILY
Status: DISCONTINUED | OUTPATIENT
Start: 2020-01-01 | End: 2020-01-02

## 2020-01-01 RX ORDER — HYDROXYZINE 50 MG/1
50 TABLET, FILM COATED ORAL 3 TIMES DAILY PRN
Status: ON HOLD | COMMUNITY
End: 2020-01-03 | Stop reason: HOSPADM

## 2020-01-01 RX ORDER — CHLORPROMAZINE HYDROCHLORIDE 25 MG/1
25 TABLET, FILM COATED ORAL 2 TIMES DAILY
Status: ON HOLD | COMMUNITY
End: 2020-01-03 | Stop reason: HOSPADM

## 2020-01-01 RX ORDER — DULOXETIN HYDROCHLORIDE 60 MG/1
60 CAPSULE, DELAYED RELEASE ORAL DAILY
Status: ON HOLD | COMMUNITY
End: 2020-01-03 | Stop reason: HOSPADM

## 2020-01-01 RX ORDER — ALBUTEROL SULFATE 90 UG/1
1 AEROSOL, METERED RESPIRATORY (INHALATION) EVERY 6 HOURS PRN
COMMUNITY

## 2020-01-01 RX ORDER — ONDANSETRON 4 MG/1
4 TABLET, ORALLY DISINTEGRATING ORAL ONCE
Status: COMPLETED | OUTPATIENT
Start: 2020-01-01 | End: 2020-01-01

## 2020-01-01 RX ORDER — PRAZOSIN HYDROCHLORIDE 1 MG/1
1 CAPSULE ORAL NIGHTLY
Status: DISCONTINUED | OUTPATIENT
Start: 2020-01-01 | End: 2020-01-03 | Stop reason: HOSPADM

## 2020-01-01 RX ORDER — PRAZOSIN HYDROCHLORIDE 1 MG/1
1 CAPSULE ORAL NIGHTLY
Status: ON HOLD | COMMUNITY
End: 2020-01-03 | Stop reason: HOSPADM

## 2020-01-01 RX ORDER — CLONIDINE 0.3 MG/24H
1 PATCH, EXTENDED RELEASE TRANSDERMAL WEEKLY
Status: DISCONTINUED | OUTPATIENT
Start: 2020-01-01 | End: 2020-01-03 | Stop reason: HOSPADM

## 2020-01-01 RX ORDER — MAGNESIUM HYDROXIDE/ALUMINUM HYDROXICE/SIMETHICONE 120; 1200; 1200 MG/30ML; MG/30ML; MG/30ML
30 SUSPENSION ORAL EVERY 6 HOURS PRN
Status: DISCONTINUED | OUTPATIENT
Start: 2020-01-01 | End: 2020-01-03 | Stop reason: HOSPADM

## 2020-01-01 RX ORDER — NORTRIPTYLINE HYDROCHLORIDE 25 MG/1
50 CAPSULE ORAL NIGHTLY
Status: ON HOLD | COMMUNITY
End: 2020-01-03 | Stop reason: HOSPADM

## 2020-01-01 RX ADMIN — ONDANSETRON 4 MG: 4 TABLET, ORALLY DISINTEGRATING ORAL at 05:54

## 2020-01-01 RX ADMIN — LACTULOSE 30 G: 20 SOLUTION ORAL at 07:50

## 2020-01-01 RX ADMIN — NORTRIPTYLINE HYDROCHLORIDE 50 MG: 25 CAPSULE ORAL at 21:24

## 2020-01-01 RX ADMIN — PRAZOSIN HYDROCHLORIDE 1 MG: 1 CAPSULE ORAL at 21:24

## 2020-01-01 RX ADMIN — DULOXETINE HYDROCHLORIDE 60 MG: 60 CAPSULE, DELAYED RELEASE ORAL at 18:00

## 2020-01-01 RX ADMIN — PALIPERIDONE 6 MG: 3 TABLET, EXTENDED RELEASE ORAL at 18:00

## 2020-01-01 RX ADMIN — CHLORPROMAZINE HYDROCHLORIDE 25 MG: 25 TABLET, SUGAR COATED ORAL at 21:24

## 2020-01-01 RX ADMIN — PANTOPRAZOLE SODIUM 40 MG: 40 TABLET, DELAYED RELEASE ORAL at 05:54

## 2020-01-01 RX ADMIN — AMLODIPINE BESYLATE 5 MG: 5 TABLET ORAL at 18:06

## 2020-01-01 RX ADMIN — LIDOCAINE HYDROCHLORIDE: 20 SOLUTION ORAL; TOPICAL at 05:54

## 2020-01-01 RX ADMIN — HYDROXYZINE HYDROCHLORIDE 50 MG: 25 TABLET ORAL at 09:28

## 2020-01-01 RX ADMIN — QUETIAPINE FUMARATE 200 MG: 100 TABLET ORAL at 21:24

## 2020-01-01 RX ADMIN — HYDROXYZINE HYDROCHLORIDE 50 MG: 25 TABLET ORAL at 17:27

## 2020-01-01 ASSESSMENT — SLEEP AND FATIGUE QUESTIONNAIRES
DO YOU HAVE DIFFICULTY SLEEPING: NO
DIFFICULTY STAYING ASLEEP: YES
DIFFICULTY FALLING ASLEEP: YES
DIFFICULTY STAYING ASLEEP: YES
DO YOU USE A SLEEP AID: COMMENT
RESTFUL SLEEP: NO
DIFFICULTY FALLING ASLEEP: YES
SLEEP PATTERN: DIFFICULTY FALLING ASLEEP;NORMAL
DIFFICULTY ARISING: YES
RESTFUL SLEEP: NO
AVERAGE NUMBER OF SLEEP HOURS: 5
DIFFICULTY ARISING: YES
DO YOU USE A SLEEP AID: YES
DO YOU HAVE DIFFICULTY SLEEPING: NO
DO YOU HAVE DIFFICULTY SLEEPING: NO
SLEEP PATTERN: DIFFICULTY ARISING
AVERAGE NUMBER OF SLEEP HOURS: 5
DO YOU USE A SLEEP AID: YES

## 2020-01-01 ASSESSMENT — PAIN DESCRIPTION - PAIN TYPE
TYPE: CHRONIC PAIN

## 2020-01-01 ASSESSMENT — ENCOUNTER SYMPTOMS
DIARRHEA: 0
EYE PAIN: 0
CHEST TIGHTNESS: 0
CONSTIPATION: 0
VOMITING: 0
CHOKING: 0
EYE DISCHARGE: 0
SINUS PRESSURE: 0
COUGH: 0
BLOOD IN STOOL: 0
ABDOMINAL PAIN: 1
RHINORRHEA: 0
WHEEZING: 0
BACK PAIN: 0
SHORTNESS OF BREATH: 0
PHOTOPHOBIA: 0
ABDOMINAL DISTENTION: 0
NAUSEA: 0
EYE ITCHING: 0
SORE THROAT: 0
VOICE CHANGE: 0
EYE REDNESS: 0
TROUBLE SWALLOWING: 0

## 2020-01-01 ASSESSMENT — PAIN DESCRIPTION - FREQUENCY
FREQUENCY: CONTINUOUS

## 2020-01-01 ASSESSMENT — PAIN DESCRIPTION - ORIENTATION
ORIENTATION: LOWER

## 2020-01-01 ASSESSMENT — PAIN SCALES - GENERAL
PAINLEVEL_OUTOF10: 0
PAINLEVEL_OUTOF10: 8
PAINLEVEL_OUTOF10: 8
PAINLEVEL_OUTOF10: 0
PAINLEVEL_OUTOF10: 8
PAINLEVEL_OUTOF10: 10

## 2020-01-01 ASSESSMENT — PAIN DESCRIPTION - ONSET
ONSET: ON-GOING

## 2020-01-01 ASSESSMENT — PAIN DESCRIPTION - LOCATION
LOCATION: BACK

## 2020-01-01 ASSESSMENT — PATIENT HEALTH QUESTIONNAIRE - PHQ9
SUM OF ALL RESPONSES TO PHQ QUESTIONS 1-9: 18
SUM OF ALL RESPONSES TO PHQ QUESTIONS 1-9: 18

## 2020-01-01 ASSESSMENT — PAIN DESCRIPTION - PROGRESSION
CLINICAL_PROGRESSION: NOT CHANGED

## 2020-01-01 ASSESSMENT — PAIN DESCRIPTION - DESCRIPTORS
DESCRIPTORS: CONSTANT

## 2020-01-01 ASSESSMENT — PAIN - FUNCTIONAL ASSESSMENT
PAIN_FUNCTIONAL_ASSESSMENT: PREVENTS OR INTERFERES SOME ACTIVE ACTIVITIES AND ADLS

## 2020-01-01 ASSESSMENT — LIFESTYLE VARIABLES: HISTORY_ALCOHOL_USE: NO

## 2020-01-01 NOTE — ED PROVIDER NOTES
father is unknown.   family history includes Diabetes in her mother; Heart Disease in her father. SOCIAL HISTORY      reports that she has been smoking e-cigarettes. She has been smoking about 0.00 packs per day for the past 1.00 year. She uses smokeless tobacco. She reports previous alcohol use of about 6.0 standard drinks of alcohol per week. She reports previous drug use. PHYSICAL EXAM     INITIAL VITALS:  height is 5' 8\" (1.727 m) and weight is 300 lb (136.1 kg). Her oral temperature is 97.8 °F (36.6 °C). Her blood pressure is 139/74 and her pulse is 68. Her respiration is 14 and oxygen saturation is 94%. Physical Exam  Vitals signs and nursing note reviewed. Constitutional:       General: She is not in acute distress. Appearance: She is well-developed. She is not diaphoretic. HENT:      Head: Normocephalic and atraumatic. Right Ear: External ear normal.      Left Ear: External ear normal.      Nose: Nose normal.      Mouth/Throat:      Pharynx: No oropharyngeal exudate. Eyes:      General: No scleral icterus. Right eye: No discharge. Left eye: No discharge. Conjunctiva/sclera: Conjunctivae normal.      Pupils: Pupils are equal, round, and reactive to light. Neck:      Musculoskeletal: Normal range of motion and neck supple. Thyroid: No thyromegaly. Vascular: No JVD. Trachea: No tracheal deviation. Cardiovascular:      Rate and Rhythm: Normal rate and regular rhythm. Heart sounds: Normal heart sounds, S1 normal and S2 normal. No murmur. No friction rub. No gallop. Pulmonary:      Effort: Pulmonary effort is normal.      Breath sounds: Normal breath sounds. No stridor. No wheezing, rhonchi or rales. Chest:      Chest wall: No tenderness. Abdominal:      General: Bowel sounds are normal. There is no distension. Palpations: Abdomen is soft. There is no mass. Tenderness: There is generalized tenderness.  There is no guarding or rebound. Negative signs include Buckner's sign, Rovsing's sign, McBurney's sign and psoas sign. Hernia: No hernia is present. Musculoskeletal: Normal range of motion. General: No tenderness. Lymphadenopathy:      Cervical: No cervical adenopathy. Skin:     General: Skin is warm and dry. Findings: No bruising, ecchymosis, lesion or rash. Neurological:      Mental Status: She is alert and oriented to person, place, and time. Cranial Nerves: No cranial nerve deficit. Coordination: Coordination normal.      Deep Tendon Reflexes: Reflexes are normal and symmetric. Psychiatric:         Speech: Speech normal.         Behavior: Behavior normal.         Thought Content: Thought content normal.         Judgment: Judgment normal.           DIFFERENTIAL DIAGNOSIS:   Differential diagnosis discussed extensively at bedside with the patient including but not limited to suicidal ideation, schizophrenia, substance-induced mood disorder, possible ingestion. DIAGNOSTIC RESULTS     EKG: All EKG's are interpreted by the Emergency Department Physician who either signs or Co-signs this chart in the absence of a cardiologist.  EKG revealed normal sinus rhythm, normal axis, ventricular rate 88 MD interval 150 QRS duration of 88 QT interval 396 QTC of 479. RADIOLOGY: non-plain film images(s) such as CT, Ultrasound and MRI are read by the radiologist.  CT ABDOMEN PELVIS WO CONTRAST Additional Contrast? None   Final Result   1. Retention of stool compatible with constipation. 2. The visualized aspects of the lung bases are clear. The base of the heart is within appropriate limits. **This report has been created using voice recognition software. It may contain minor errors which are inherent in voice recognition technology. **      Final report electronically signed by Dr. Willian Young on 1/1/2020 6:43 AM            LABS:   Labs Reviewed   BASIC METABOLIC PANEL - Abnormal; Notable for the

## 2020-01-01 NOTE — PROGRESS NOTES
752 Call from . Ready for patient to come up. Called nursing desk and spoke to Carmen Fatima to let them know.

## 2020-01-01 NOTE — H&P
Behavioral Services  Medicare Certification Upon Admission    I certify that this patient's inpatient psychiatric hospital admission is medically necessary for:   X (1) Treatment which could reasonably be expected to improve this patient's condition,      X (2) Or for diagnostic study;     AND     X (2) The inpatient psychiatric services are provided while the individual is under the care of a physician and are included in the individualized plan of care. Estimated length of stay/service 2-10 days    Plan for post-hospital care: Follow up with Eliza Gardner. Electronically signed by Phu Sanders PA-C on 1/1/2020 at 5:57 PM                                     Psychiatry Attending Attestation     I assessed this patient and reviewed the case and plan of care with Phu Sanders PA-C. I have reviewed the above documentation and I agree with the findings and treatment plan with the following updates. Patient is a 51-year-old female with history of schizoaffective disorder presented to the emergency department after making suicidal statements. Patient presented initially to [facility] and was sent back home to [facility] where she was living and was brought back in after she mentioned sending messages that she was going to kill herself tonight. Patient reports feeling down and low for more days than not. Endorses anhedonia. Patient notes her depression has been worsening for last few weeks . Patient could not identify any specific stressor. Patient reports poor sleep and appetite. Patient reports having trouble falling asleep. Patient reports poor concentration and energy. Patient endorses feelings of helplessness, hopelessness and worthlessness. She denies any psychotic symptoms. She denies any auditory or visual hallucinations.    Agree with rest of the assessment and plan as above  Electronically signed by Harmeet Welsh MD on 1/1/20 at 6:45 PM    **This report has been created using voice recognition software. It may contain minor errors which are inherent in voice recognition technology. **

## 2020-01-01 NOTE — PROGRESS NOTES
have the ability to pay for your medications?  unsure  5. Where will you be residing when you leave the hospital? [facility]  6. Will need a return to work slip or FMLA paper completion?  NA      GOALS UPDATE: 3 day  Time frame for Short-Term Goals: daily/ongoing    Latoya Main LPC

## 2020-01-01 NOTE — PROGRESS NOTES
BHI Biopsychosocial Assessment    Current Level of Psychosocial Functioning     Independent XX  Dependent    Minimal Assist     Comments:      Psychosocial High Risk Factors (check all that apply)    Unable to obtain meds   Chronic illness/pain   XX  Substance abuse XX  Lack of Family Support   Financial stress   Isolation   Inadequate Community Resources  Suicide attempt(s)  Not taking medications   Victim of crime   Developmental Delay  Unable to manage personal needs    Age 72 or older   Homeless XX/shelter  No transportation   Readmission within 30 days  Unemployment  Traumatic Event    Psychiatric Advanced Directive: JADE    Family to involve in treatment:JADE    Sexual Orientation:  JADE    Patient Strengths: hopeful about future    Patient Barriers: potentially harmful leisure interests    Opiate education provided: No/unable    Safety plan: JADE    CMHC/MH history: JADE    Plan of Care:  medication management, group/individual therapies, family meetings, psycho -education, treatment team meetings to assist with stabilization    Initial Discharge Plan:  Patient involved with Rehana Bowman. Staying at [address] [place] [address] Summary:  Patient is a 64year old female admitted after reporting suicidal ideation, although patient has since stated she was not suicidal and said she was to get admitted. Patient is poor historian and has made conflicting reports to staff; Eli Thompson much of history for this reason. Patient has a history of psychiatric admissions, one due to suicide attempt in 2016. Patient reportedly denies current drug/alchol use but has history per chart. Information taken from patient chart review. Baby temp 97.5 ax under both arms, baby placed skin to skin with mother with hat on. Mother informed to keep baby skin to skin until temp is rechecked. Mother voiced understanding.

## 2020-01-01 NOTE — ED NOTES
Pt stumbling while walking to bathroom in saferooms. Once in saferoom, pt leaning over holding on to sink, moaning. When asked, pt states she has pain in abd. Pt pale in color. Pt sat on toilet, rocking back and forth and continued to moan in pain. Urine sample obtained and pt placed in [facility] and taken to room 9 for further evaluation. Eulalia Kiran, primary RN notified and at bedside.       Sienna Hernandez RN  01/01/20 1800 [facility], RN  01/01/20 2416

## 2020-01-01 NOTE — ED NOTES
ED nurse-to-nurse bedside report    Chief Complaint   Patient presents with    Suicidal    Psychiatric Evaluation      LOC: alert and orientated to name, place, date  Vital signs   Vitals:    01/01/20 0516 01/01/20 0517 01/01/20 0558 01/01/20 0655   BP:  (!) 171/86  139/74   Pulse: 88  92 68   Resp: 18  20 14   Temp:       TempSrc:       SpO2: 95%  96% 94%   Weight:       Height:          Pain:    Pain Interventions: none  Pain Goal: none  Oxygen: No    Current needs required none   Telemetry: Yes  LDAs:    Continuous Infusions:   Mobility: Requires assistance * 1  Delacruz Fall Risk Score: No flowsheet data found. Fall Interventions: side rails  Report given to: Melany Frankel RN.         Gilberto Ratliff RN  01/01/20 5436

## 2020-01-01 NOTE — ED TRIAGE NOTES
Pt to er. Pt brought in by [employer] voluntary for suicidal thoughts and psych eval requested by place she is staying at. Pt states she stays at a [facility] house. LPD states she sent someone a message stating she was going to kill herself tonight. Pt states she is feeling suicidal at one point and then when asked again pt states she is not. Pt c/o back pain and states it is chronic. Pt denies any drug or alcohol. Pt cooperative, however slow to respond to questions and answers frequently with \"I dont know. \" Patient placed in safe room that is ligature resistant with continuous monitoring in place. Provider notified, requested an assessment by behavioral health . Patient belongings secured in a locked lockers outside of the room. Explained suicide prevention precautions to the patient including constant observer.

## 2020-01-01 NOTE — ED NOTES
Upon first contact with patient this RN receives bedside shift report from Guerline Singh [facility].         Nona Patel RN  01/01/20 0700

## 2020-01-01 NOTE — ED NOTES
ED to inpatient nurses report    Chief Complaint   Patient presents with    Suicidal    Psychiatric Evaluation      Present to ED from home  LOC: alert and orientated to name, place, date  Vital signs   Vitals:    01/01/20 0516 01/01/20 0517 01/01/20 0558 01/01/20 0655   BP:  (!) 171/86  139/74   Pulse: 88  92 68   Resp: 18  20 14   Temp:       TempSrc:       SpO2: 95%  96% 94%   Weight:       Height:          Oxygen Baseline RA    Current needs required none  LDAs:  none  Mobility: Requires assistance * 1  Pending ED orders: none  Present condition: stable    Electronically signed by Marianna Blas RN on 1/1/2020 at 7:47 AM       Marianna Blas RN  01/01/20 5167

## 2020-01-01 NOTE — BH NOTE
INPATIENT RECREATIONAL THERAPY  ADULT BEHAVIORAL SERVICES  EVALUATION    REFERRING PHYSICIAN:  Dr. Alyssa Fulton   DIAGNOSIS:   Major Depressive Disorder   PRECAUTIONS:  Standard Precautions   HISTORY OF PRESENT ILLNESS/INJURY: Pt is admitted to the unit voluntarily. Pt reportedly was at [facility] for similar complaints. Pt came from her recovery home following sending a message to someone that she was going to kill herself. Pt currently denies suicidal ideations. Pt reports that she has been increasingly depressed but denies homicidal thoughts and/or plans. Pt is cooperative and pleasant throughout assessment. PMH:  Please see medical chart for prior medical history, allergies, and medication    HISTORY OF PSYCHIATRIC TREATMENT: Pt has a hx of outpatient psychiatric services and is currently living in recovery house. Patient admitted to [facility] 6/13/16 for suicide attempt. Patient reports daily depression. Patient reports daily suicidal ideation. Patient denies receiving outpatient services for mental health treatment. YOB: 1960  GENDER:  Female   MARITAL STATUS:  Single   EMPLOYMENT STATUS:  Unemployed   LIVING SITUATION/SUPPORT:  Living [place] [id number]   EDUCATIONAL LEVEL:   MEDICATION/DRUG USE: Pt denies any drug or alcohol use, and is currently staying with a recovery house.      LEISURE INTERESTS:  arts/crafts, watching TV/Movies, listening to music, activities with friends, playing board games  ACTIVITY PREFERENCE: No preference    ACTIVITY TYPES:  Indoor, Outdoor, Active, Passive   COGNITION: A&OX4    COPING: Poor   ATTENTION: Fair   RELAXATION: Fair   SELF-ESTEEM: Poor   MOTIVATION:  Fair     SOCIAL SKILLS:  Fair   FRUSTRATION TOLERANCE:  No documented hx of violence   ATTENTION SEEKING: No attention seeking behaviors noted at this time   COOPERATION: Pt cooperative and attentive   AFFECT: Flat   APPEARANCE: Pt displays poor grooming and hygiene and is currently dressed in hospital scrub attire    HEARING:  No impairments noted at this time   VISION:   No impairments noted at this time   VERBAL COMMUNICATION:  No impairments noted at this time   WRITTEN COMMUNICATION:  Mild impairments noted at this time     COORDINATION:  No impairments noted at this time   MOBILITY:  Pt ambulates independently    GOALS:  Pt to increase socialization and knowledge of coping skills through participation in group therapy sessions following verbal encouragement from staff.

## 2020-01-01 NOTE — PROGRESS NOTES
Provisional Diagnosis:    Schizoaffective Disorder, per history     Risk, Psychosocial and Contextual Factors:      Current  Treatment:  Yes      Present Suicidal Behavior:      Verbal:  Denies        Attempt: Denies    Access to Weapons:  Denies    Current Suicide Risk: Low, Moderate or High:   Moderate      Past Suicidal Behavior:       Verbal:  Yes, today    Attempt: Denies, Yes per history     Self-Injurious/Self-Mutilation: Denies    Traumatic Event Within Past 2 Weeks:   Denies    Current Abuse: Denies    Legal: Denies    Violence: Denies    Protective Factors:  Recovery House     Housing:    [facility]    Clinical Summary:      Patient is a 64year old  female who presents to the ED voluntarily due to suicidal ideation by [employer]. Per physician note, patient was in [facility] this evening for the same thing. Patient presented to [facility] (recovery home) following presentation to [address] requested patient be brought to [facility] for further evaluation. Per nurse note, patient sent someone a messages stating she was going to kill herself tonight. Patient reported present suicidal ideation to nurse then denied. Patient denies present suicidal ideation to this clincian. Patient reports to this clinician 'I felt suicidal earlier'. Patient denies plan at this time. Patient denies reports previous suicidal ideation. Patient denies previous attempt. Patient admitted to [facility] 6/13/16 for suicide attempt. Patient reports daily depression. Patient reports daily suicidal ideation. Patient denies receiving outpatient services for mental health treatment. Homicidal thoughts and/or plans denied. No delusions noted. Patient reports 'I dont know' when asked if she has hallucinations. AOD denied. Patient alert and oriented x4. Patient hypermobile throughout the assessment.      Level of Care Disposition:      4:50 Spoke with Dr. Eb Batista who reports patient was evaluated for suicide attempt while at [place]. 6:45 Consulted with medical provider Dr. Kat Carter. Patient is medically cleared. 6:50 Consulted with Dr. Rigoberto Gómez. Patient to be admitted for inpatient treatment. Informed Dr. Kat Carter. Patient placed under an [address] at this time. Report given to 4E.

## 2020-01-01 NOTE — PROGRESS NOTES
None  Were All Patient Medications Collected?: SER(#[id number])  Other Valuables: Other (Comment)($20.00/ID card)     Admission order obtained yes  Valuables placed in safe in security envelope, number:  [id number]. Patient's home medications were reviewed with home medication list from [facility] with Shelia Davenport. Patient oriented to surroundings and program expectations and copy of patient rights given. Received admission packet:  yes  Consents reviewed, signed yes. Patient verbalize understanding:  yes   Patient education on precautions: yes          Patient screened positive for suicide risk on CSSR-S (\"yes\" to question #4, 5, OR 6)  yes. Physician notified of risk score. Orders received no. Explained patients right to have family, representative or physician notified of their admission. Patient has declined for physician to be notified. Patient has declined for family/representative to be notified. Provided pt with [employer] handout entitled \"Quitting Smoking. \"  Reviewed handout with pt addressing dangers of smoking, developing coping skills, and providing basic information about quitting. Pt response to counseling:  Verbalized understanding and willingness to comply    64 yr old female who has been residing at [facility] with Shelia Davenport. She presents to our ED by the staff from [facility]. She at first denies having thoughts of suicide but then reported \"I have been thinking about overdosing on my pills\" She is 6 months clean from Fentanyl and alcohol. She reports attending a  recovery function in the [facility] and began having an anxiety attack with chest pain she was evaluate at [facility] and discharged back to [facility]. The staff felt she needed evaluated for her safety.     Pt has had is poor historian regarding her medications and shared \"the staff at [facility] thought I had taken too much of medication\" Pt denies taking more than what is prescribed but confessed that  her Neurontin was discontinued as she took more than what was prescribed                 Staci Porter RN

## 2020-01-02 LAB
ORGANISM: ABNORMAL
URINE CULTURE REFLEX: ABNORMAL

## 2020-01-02 PROCEDURE — 99232 SBSQ HOSP IP/OBS MODERATE 35: CPT | Performed by: PSYCHIATRY & NEUROLOGY

## 2020-01-02 PROCEDURE — 1240000000 HC EMOTIONAL WELLNESS R&B

## 2020-01-02 PROCEDURE — 6370000000 HC RX 637 (ALT 250 FOR IP): Performed by: PSYCHIATRY & NEUROLOGY

## 2020-01-02 PROCEDURE — 90833 PSYTX W PT W E/M 30 MIN: CPT | Performed by: PSYCHIATRY & NEUROLOGY

## 2020-01-02 PROCEDURE — 6370000000 HC RX 637 (ALT 250 FOR IP): Performed by: PHYSICIAN ASSISTANT

## 2020-01-02 RX ORDER — GABAPENTIN 100 MG/1
200 CAPSULE ORAL 3 TIMES DAILY
Status: DISCONTINUED | OUTPATIENT
Start: 2020-01-02 | End: 2020-01-03

## 2020-01-02 RX ADMIN — GABAPENTIN 200 MG: 400 CAPSULE ORAL at 21:49

## 2020-01-02 RX ADMIN — PRAZOSIN HYDROCHLORIDE 1 MG: 1 CAPSULE ORAL at 21:49

## 2020-01-02 RX ADMIN — PALIPERIDONE 6 MG: 3 TABLET, EXTENDED RELEASE ORAL at 08:08

## 2020-01-02 RX ADMIN — CHLORPROMAZINE HYDROCHLORIDE 25 MG: 25 TABLET, SUGAR COATED ORAL at 21:49

## 2020-01-02 RX ADMIN — GABAPENTIN 200 MG: 400 CAPSULE ORAL at 15:55

## 2020-01-02 RX ADMIN — IBUPROFEN 400 MG: 400 TABLET, FILM COATED ORAL at 14:54

## 2020-01-02 RX ADMIN — NORTRIPTYLINE HYDROCHLORIDE 50 MG: 25 CAPSULE ORAL at 21:49

## 2020-01-02 RX ADMIN — QUETIAPINE FUMARATE 200 MG: 100 TABLET ORAL at 21:49

## 2020-01-02 RX ADMIN — DULOXETINE HYDROCHLORIDE 60 MG: 60 CAPSULE, DELAYED RELEASE ORAL at 08:08

## 2020-01-02 RX ADMIN — CHLORPROMAZINE HYDROCHLORIDE 25 MG: 25 TABLET, SUGAR COATED ORAL at 08:08

## 2020-01-02 ASSESSMENT — PAIN DESCRIPTION - ORIENTATION
ORIENTATION: LOWER
ORIENTATION: LOWER

## 2020-01-02 ASSESSMENT — PAIN DESCRIPTION - LOCATION
LOCATION: BACK
LOCATION: BACK

## 2020-01-02 ASSESSMENT — PAIN DESCRIPTION - PAIN TYPE
TYPE: CHRONIC PAIN
TYPE: CHRONIC PAIN

## 2020-01-02 ASSESSMENT — PAIN - FUNCTIONAL ASSESSMENT
PAIN_FUNCTIONAL_ASSESSMENT: PREVENTS OR INTERFERES SOME ACTIVE ACTIVITIES AND ADLS
PAIN_FUNCTIONAL_ASSESSMENT: PREVENTS OR INTERFERES SOME ACTIVE ACTIVITIES AND ADLS

## 2020-01-02 ASSESSMENT — PAIN DESCRIPTION - ONSET
ONSET: ON-GOING
ONSET: ON-GOING

## 2020-01-02 ASSESSMENT — PAIN DESCRIPTION - DESCRIPTORS
DESCRIPTORS: CONSTANT
DESCRIPTORS: CONSTANT

## 2020-01-02 ASSESSMENT — PAIN DESCRIPTION - PROGRESSION
CLINICAL_PROGRESSION: NOT CHANGED
CLINICAL_PROGRESSION: NOT CHANGED

## 2020-01-02 ASSESSMENT — PAIN DESCRIPTION - DIRECTION: RADIATING_TOWARDS: NECK

## 2020-01-02 ASSESSMENT — PAIN SCALES - GENERAL
PAINLEVEL_OUTOF10: 0
PAINLEVEL_OUTOF10: 6

## 2020-01-02 ASSESSMENT — PAIN DESCRIPTION - FREQUENCY
FREQUENCY: CONTINUOUS
FREQUENCY: CONTINUOUS

## 2020-01-02 NOTE — BH NOTE
Group Therapy Note    Date: 1/2/2020  Start Time:  1000  End Time:   1100  Number of Participants:   8    Type of Group: Recreational/Pet Therapy    Patient's Goal:   Increase socialization and physical movement. Notes:   Patient was social with others. Patient appeared to enjoy playing with Corinne Quinones, the therapy dog.      Status After Intervention:  Improved    Participation Level: Interactive    Participation Quality: Appropriate, Attentive and Sharing      Speech:  normal      Thought Process/Content: Logical      Affective Functioning: Congruent      Mood: euthymic      Level of consciousness:  Oriented x4      Response to Learning: Able to verbalize current knowledge/experience and Progressing to goal      Endings: None Reported    Modes of Intervention: Socialization, Activity and Movement      Discipline Responsible: Psychoeducational Specialist      Signature:  Aminata Farley
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
## 2020-01-02 NOTE — PATIENT CARE CONFERENCE
[facility]  Initial Interdisciplinary Treatment Plan NOTE    Review Date & Time: 1/2/20 10:25    Patient was in treatment team    Admission Type:   Admission Type: Involuntary    Reason for admission:  Reason for Admission: Cheo Santiago made my come. Moni Rossi They would be staff from [facility]\"      Estimated Length of Stay Update:  3 days  Estimated Discharge Date Update: 3-5 days    PATIENT STRENGTHS:  Patient Strengths Strengths: Connection to output provider  Patient Strengths and Limitations:Limitations: Difficulty problem solving/relies on others to help solve problems, Inappropriate/potentially harmful leisure interests  Addictive Behavior:Addictive Behavior  In the past 3 months, have you felt or has someone told you that you have a problem with:  : None  Do you have a history of Chemical Use?: (Yes pt reports being in Recovery)  Do you have a history of Alcohol Use?: No(yes but in recovery)  Do you have a history of Street Drug Abuse?: No(yes but pt reports being recovery)  Histroy of Prescripton Drug Abuse?: No  Medical Problems:  Past Medical History:   Diagnosis Date    Anxiety     Chronic low back pain     Depression     Hypertension     [facility]     Schizoaffective disorder ([facility]        EDUCATION:   Learner Progress Toward Treatment Goals: Reviewed results and recommendations of this team, Reviewed group plan and strategies, Reviewed signs, symptoms and risk of self harm and violent behavior and Reviewed goals and plan of care    Method: Small group    Outcome: Verbalized understanding    PATIENT GOALS: medication, coping skills    PLAN/TREATMENT RECOMMENDATIONS UPDATE:   1. What is the most important thing we can help you with while you are here? See goal  2. Who is your support system? Sister Hitesh  3. Do you have follow-up providers? Was seeing Noé Jimenez  4. Do you have the ability to pay for your medications? Yes has insurance  5.  Where will you be residing when you leave the hospital? [facility] or with sister  6.  Will need a return to work slip or FMLA paper completion? denied      GOALS UPDATE:   Time frame for Short-Term Goals: daily/ongoing    Gavi Rogers [facility]

## 2020-01-02 NOTE — PROGRESS NOTES
Nutrition Note      Nursing Nutrition screen completed per nutrition standards of care. Pt. does not meet criteria for positive nutrition screen at this time. The patient will be re-screened per nutrition care process policy. Please consult dietitian if intervention is needed before that time.      Electronically signed by Marcela Black RD, FRANCISCO on 1/2/20 at 7:29 AM

## 2020-01-03 VITALS
RESPIRATION RATE: 18 BRPM | HEART RATE: 84 BPM | OXYGEN SATURATION: 95 % | WEIGHT: 293 LBS | SYSTOLIC BLOOD PRESSURE: 129 MMHG | DIASTOLIC BLOOD PRESSURE: 62 MMHG | HEIGHT: 68 IN | BODY MASS INDEX: 44.41 KG/M2 | TEMPERATURE: 97.7 F

## 2020-01-03 PROCEDURE — 6370000000 HC RX 637 (ALT 250 FOR IP): Performed by: PSYCHIATRY & NEUROLOGY

## 2020-01-03 PROCEDURE — 6370000000 HC RX 637 (ALT 250 FOR IP): Performed by: PHYSICIAN ASSISTANT

## 2020-01-03 PROCEDURE — 99239 HOSP IP/OBS DSCHRG MGMT >30: CPT | Performed by: PSYCHIATRY & NEUROLOGY

## 2020-01-03 PROCEDURE — 5130000000 HC BRIDGE APPOINTMENT

## 2020-01-03 RX ORDER — FLUOXETINE HYDROCHLORIDE 20 MG/1
20 CAPSULE ORAL DAILY
Qty: 30 CAPSULE | Refills: 0 | Status: SHIPPED | OUTPATIENT
Start: 2020-01-03 | End: 2020-02-03 | Stop reason: ALTCHOICE

## 2020-01-03 RX ORDER — FLUOXETINE HYDROCHLORIDE 20 MG/1
20 CAPSULE ORAL DAILY
Status: DISCONTINUED | OUTPATIENT
Start: 2020-01-03 | End: 2020-01-03 | Stop reason: HOSPADM

## 2020-01-03 RX ORDER — DULOXETINE 40 MG/1
80 CAPSULE, DELAYED RELEASE ORAL DAILY
Qty: 60 CAPSULE | Refills: 0 | Status: SHIPPED | OUTPATIENT
Start: 2020-01-04 | End: 2020-02-03 | Stop reason: ALTCHOICE

## 2020-01-03 RX ORDER — QUETIAPINE FUMARATE 300 MG/1
300 TABLET, FILM COATED ORAL NIGHTLY
Qty: 30 TABLET | Refills: 0 | Status: ON HOLD | OUTPATIENT
Start: 2020-01-03 | End: 2020-03-19 | Stop reason: SDUPTHER

## 2020-01-03 RX ORDER — CLONIDINE 0.3 MG/24H
1 PATCH, EXTENDED RELEASE TRANSDERMAL WEEKLY
Qty: 4 PATCH | Refills: 0 | Status: SHIPPED | OUTPATIENT
Start: 2020-01-08 | End: 2020-02-03 | Stop reason: ALTCHOICE

## 2020-01-03 RX ORDER — QUETIAPINE FUMARATE 100 MG/1
300 TABLET, FILM COATED ORAL NIGHTLY
Status: DISCONTINUED | OUTPATIENT
Start: 2020-01-03 | End: 2020-01-03 | Stop reason: HOSPADM

## 2020-01-03 RX ORDER — PRAZOSIN HYDROCHLORIDE 1 MG/1
1 CAPSULE ORAL NIGHTLY
Qty: 30 CAPSULE | Refills: 0 | Status: SHIPPED | OUTPATIENT
Start: 2020-01-03 | End: 2020-02-03 | Stop reason: ALTCHOICE

## 2020-01-03 RX ORDER — HYDROXYZINE 50 MG/1
50 TABLET, FILM COATED ORAL 3 TIMES DAILY PRN
Qty: 45 TABLET | Refills: 0 | Status: SHIPPED | OUTPATIENT
Start: 2020-01-03 | End: 2020-01-18

## 2020-01-03 RX ADMIN — CHLORPROMAZINE HYDROCHLORIDE 25 MG: 25 TABLET, SUGAR COATED ORAL at 08:27

## 2020-01-03 RX ADMIN — GABAPENTIN 200 MG: 400 CAPSULE ORAL at 08:27

## 2020-01-03 RX ADMIN — IBUPROFEN 400 MG: 400 TABLET, FILM COATED ORAL at 08:28

## 2020-01-03 RX ADMIN — PALIPERIDONE 6 MG: 3 TABLET, EXTENDED RELEASE ORAL at 08:27

## 2020-01-03 RX ADMIN — FLUOXETINE HYDROCHLORIDE 20 MG: 20 CAPSULE ORAL at 11:49

## 2020-01-03 RX ADMIN — DULOXETINE HYDROCHLORIDE 80 MG: 60 CAPSULE, DELAYED RELEASE ORAL at 08:27

## 2020-01-03 ASSESSMENT — PAIN DESCRIPTION - DIRECTION: RADIATING_TOWARDS: NECK

## 2020-01-03 ASSESSMENT — PAIN DESCRIPTION - FREQUENCY: FREQUENCY: CONTINUOUS

## 2020-01-03 ASSESSMENT — PAIN DESCRIPTION - ORIENTATION: ORIENTATION: LOWER

## 2020-01-03 ASSESSMENT — PAIN DESCRIPTION - DESCRIPTORS: DESCRIPTORS: CONSTANT

## 2020-01-03 ASSESSMENT — PAIN DESCRIPTION - PAIN TYPE: TYPE: CHRONIC PAIN

## 2020-01-03 ASSESSMENT — PAIN - FUNCTIONAL ASSESSMENT: PAIN_FUNCTIONAL_ASSESSMENT: PREVENTS OR INTERFERES WITH MANY ACTIVE NOT PASSIVE ACTIVITIES

## 2020-01-03 ASSESSMENT — PAIN DESCRIPTION - LOCATION: LOCATION: BACK

## 2020-01-03 ASSESSMENT — PAIN SCALES - GENERAL
PAINLEVEL_OUTOF10: 5
PAINLEVEL_OUTOF10: 5

## 2020-01-03 ASSESSMENT — PAIN DESCRIPTION - PROGRESSION: CLINICAL_PROGRESSION: NOT CHANGED

## 2020-01-03 ASSESSMENT — PAIN DESCRIPTION - ONSET: ONSET: ON-GOING

## 2020-01-03 NOTE — PROGRESS NOTES
Presented application for voluntary admission. Pt signed along with 2 staff members.  Pt is an approved discharge today with plans for her sister to pick her up and take her to assessment at [facility] at 4:30

## 2020-01-03 NOTE — DISCHARGE SUMMARY
Provider Discharge Summary     Patient ID:  Reid Balbuena  [id number]  64 y.o.  1960    Admit date: 1/1/2020    Discharge date and time: 1/3/2020  11:32 AM     Admitting Physician: Kei Hassan MD     Discharge Physician: Kei Hassan MD    Admission Diagnoses: MDD (major depressive disorder), recurrent severe, without psychosis ([facility] [F33.2]    Discharge Diagnoses:      Schizoaffective disorder, depressive type ([facility]     Patient Active Problem List   Diagnosis Code    [facility] by [facility]pharm overdose ([facility] T65.92XA    Depression F32.9    Chronic pain syndrome G89.4    Schizoaffective disorder, depressive type ([facility] F25.1    Acute cystitis without hematuria N30.00    Continuous leakage of urine N39.45    Alcohol abuse F10.10    Abnormal EKG R94.31    Affective psychosis, bipolar ([facility] F31.9    MDD (major depressive disorder), recurrent severe, without psychosis ([facility] F33.2        Admission Condition: poor    Discharged Condition: stable    Indication for Admission: threat to self    History of Present Illnes (present tense wording is of findings from admission exam and are not necessarily indicative of current findings):   Reid Balbuena is a 64 y.o. female with a history of schizoaffective disorder who presented to the emergency department  voluntarily due to suicidal ideation by LPD. Per physician note, patient was in [facility] this evening for the same thing. Patient presented to [facility] (recovery home) following presentation to [address] requested patient be brought to [facility] for further evaluation. Per nurse note, patient sent someone a messages stating she was going to kill herself tonight. Patient reported present suicidal ideation to nurse then denied. Patient denies present suicidal ideation to this clincian. Patient reports to this clinician 'I felt suicidal earlier'. Patient denies plan at this time.  Patient denies reports previous suicidal feeling ready for discharge. Patient denies suicidal or homicidal ideations, denies hallucinations or delusions. Denies SE's from meds. It was decided that maximum benefit from hospital care had been achieved and patient can be discharged. Consults:   None    Significant Diagnostic Studies: Routine labs and diagnostics    Treatments: Psychotropic medications, therapy with group, milieu, and 1:1 with nurses, social workers, SANDOR/CNP, and Attending physician.       Discharge Medications:  Current Discharge Medication List      START taking these medications    Details   cloNIDine (CATAPRES) 0.3 MG/24HR PTWK Place 1 patch onto the skin once a week  Qty: 4 patch, Refills: 0         CONTINUE these medications which have CHANGED    Details   FLUoxetine (PROZAC) 20 MG capsule Take 1 capsule by mouth daily  Qty: 30 capsule, Refills: 0      hydrOXYzine (ATARAX) 50 MG tablet Take 1 tablet by mouth 3 times daily as needed for Anxiety  Qty: 45 tablet, Refills: 0      DULoxetine 40 MG CPEP Take 80 mg by mouth daily  Qty: 60 capsule, Refills: 0      prazosin (MINIPRESS) 1 MG capsule Take 1 capsule by mouth nightly  Qty: 30 capsule, Refills: 0      QUEtiapine (SEROQUEL) 300 MG tablet Take 1 tablet by mouth nightly  Qty: 30 tablet, Refills: 0         CONTINUE these medications which have NOT CHANGED    Details   naproxen (NAPROSYN) 500 MG tablet Take 500 mg by mouth 2 times daily (with meals) 12/03/19 listed on Current Medication List      albuterol sulfate  (90 Base) MCG/ACT inhaler Inhale 1 puff into the lungs every 6 hours as needed for Wheezing 12/03/19 medication list Pt has inhaler with her (locked in medication cabinet envelope # [id number])         STOP taking these medications       chlorproMAZINE (THORAZINE) 25 MG tablet Comments:   Reason for Stopping:         cloNIDine (CATAPRES) 0.3 MG tablet Comments:   Reason for Stopping:         nortriptyline (PAMELOR) 25 MG capsule Comments:   Reason for Stopping:

## 2020-01-03 NOTE — PROGRESS NOTES
Group Therapy Note    Date: 1/3/2020  Start Time: 2000  End Time:  2020    Type of Group: Wrap-Up/relaxation    Patient's Goal:  \"Ask the doctor to go home. \"    Notes:  Goal met    Status After Intervention:  Improved    Participation Level:  Active Listener and Interactive    Participation Quality: Appropriate, Attentive and Sharing      Speech:  normal      Thought Process/Content: Logical      Affective Functioning: Blunted      Mood: euthymic      Level of consciousness:  Alert, Oriented x4 and Attentive      Response to Learning: Able to verbalize current knowledge/experience, Able to verbalize/acknowledge new learning, Able to retain information, Capable of insight, Able to change behavior and Progressing to goal      Endings: None Reported    Modes of Intervention: Education and Support      Discipline Responsible: Registered Nurse      Signature:  Baldo Manriquez RN

## 2020-01-03 NOTE — PLAN OF CARE
Problem: Depressive Behavior With or Without Suicide Precautions:  Goal: Able to verbalize and/or display a decrease in depressive symptoms  Description  Able to verbalize and/or display a decrease in depressive symptoms  1/3/2020 0030 by Johnathan Hewitt RN  Outcome: Ongoing  Note:   Patient denies depression, rates mood at a \"5-6/10. \"  1/2/2020 1326 by Westley Martínez LPN  Outcome: Ongoing  Note:   Patient denies depression. Rate mood 7/10. Appears [place], Cooperative. Goal: Ability to disclose and discuss suicidal ideas will improve  Description  Ability to disclose and discuss suicidal ideas will improve  1/3/2020 0030 by Johnathan Hewitt RN  Outcome: Ongoing  Note:   Patient denies suicidal thoughts. 1/2/2020 1326 by Westley Martínez LPN  Outcome: Ongoing  Note:   Patient denies suicidal thoughts at this  time. Goal: Absence of self-harm  Description  Absence of self-harm  1/3/2020 0030 by Johnathan Hewitt RN  Outcome: Ongoing  Note:   Patient remains safe and free from harm. 1/2/2020 1326 by Westley Martínez LPN  Outcome: Ongoing  Note:   Patient absent of self harm at this time. Goal: Patient specific goal  Description  Patient specific goal  1/3/2020 0030 by Johnathan Hewitt RN  Outcome: Ongoing  Note:   Goal met per patient. 1/2/2020 1326 by Westley Martínez LPN  Outcome: Ongoing  Note:   Patient goal \"To ask the doctor if I can be discharged\". Patient goal on-going. No discharge orders available at this time. Goal: Participates in care planning  Description  Participates in care planning  1/3/2020 0030 by Johnathan Hewitt RN  Outcome: Ongoing  Note:   Patient participated this shift. 1/2/2020 1326 by Westley Martínez LPN  Outcome: Ongoing  Note:   Patient participating in group therapy. Patient not compliant with medication. Refused to take scheduled Norvasc. Problem:  Activity:  Goal: Sleeping patterns will improve  Description  Sleeping patterns will improve  1/3/2020 0030 by Johnathan Hewitt RN  Outcome: Ongoing  Note:   Patient slept 4.5 hours the previous night. 1/2/2020 1326 by Dinah Albert LPN  Outcome: Ongoing  Note:   Patient slept 4.5 hours broken last night. Problem: KNOWLEDGE DEFICIT,EDUCATION,DISCHARGE PLAN  Goal: Knowledge - personal safety  1/3/2020 0030 by Abisai Montiel RN  Outcome: Ongoing  Note:   Safety plan not completed this shift. 1/2/2020 1326 by Dinah Albert LPN  Outcome: Ongoing  Note:   Patient has not completed a safety plan at this time. Problem: Pain:  Goal: Pain level will decrease  Description  Pain level will decrease  1/3/2020 0030 by Abisai Montiel RN  Outcome: Ongoing  Note:   Patient reports chronic back pain, refer to flow-sheets. 1/2/2020 1326 by Dinah Albert LPN  Outcome: Ongoing  Note:   Patient states pain of 7 related to chronic back pain. Patient given PRN pain medication and encouraged to rest.      Problem: Discharge Planning:  Goal: Discharged to appropriate level of care  Description  Discharged to appropriate level of care  1/3/2020 0030 by Abisai Montiel RN  Outcome: Ongoing  Note:   Discharge planning is in progress. 1/2/2020 1326 by Dinah Albert LPN  Outcome: Ongoing  Note:   Patient unsure of discharge plans at this time. Staff working together to create a discharge plan that is appropriate for this patient. Problem: General Medical Problem-Behavioral Health  Goal: Vital signs within specified parameters  1/3/2020 0030 by Abisai Montiel RN  Outcome: Ongoing  Note:   Vital signs are stable. 1/2/2020 1326 by Dinah Albert LPN  Outcome: Ongoing  Care plan reviewed with patient.   Patient does verbalize understanding of the plan of care and does contribute to goal setting

## 2020-01-06 ENCOUNTER — TELEPHONE (OUTPATIENT)
Dept: PSYCHIATRY | Age: 60
End: 2020-01-06

## 2020-01-06 NOTE — TELEPHONE ENCOUNTER
Post discharge telephone call made. Patient was contacted. Patient did not know her follow up plans, reviewed what was listed on her discharge paperwork.

## 2020-02-08 NOTE — H&P
of hardware and adjacent level decompression at the levels of  L2-L3 and L3-L4 above her previous fusion. She has been medically cleared by Barton Councilman, certified nurse  practitioner and her family provider to undergo this operation. DRUG ALLERGIES:  CODEINE and MORPHINE causing increased nausea. PAST MEDICAL HISTORY:  Significant for hypertension, anxiety, depression  and drug abuse. CURRENT MEDICATIONS:  Includes Seroquel, hydroxyzine and Norco.    PAST SURGICAL HISTORY:  Includes a L4-S1 hemilaminectomy and fusion  performed in 2013 in [place]. In 1993, she had tubal ligation, 1978  cyst removed off her tail bone, and 1976 tonsillectomy. SOCIAL HISTORY:  Smoking status:  She is a current smoker, smoking a  pack and a half of cigarettes per day. She currently lives in a private  home with her sister and her sister's . She is currently not  working. 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 She reports she has been clean  now for over six months. REVIEW OF SYSTEMS:  GENERAL:  Denies fever, fatigue or chills. RESPIRATORY:  Denies shortness of breath, productive cough, wheezing. CARDIOVASCULAR:  Denies chest pain, palpitations, leg swelling. GASTROINTESTINAL:  Denies nausea, vomiting, diarrhea, abdominal pain. GENITOURINARY:  Denies dysuria or bladder incontinence. NEUROLOGIC:  Denies seizure, blackouts or fainting. Significant for  weakness and numbness. MUSCULOSKELETAL:  Significant for back pain, leg pain, joint pain,  muscle pain and neck pain. PHYSICAL EXAMINATION:  VITAL SIGNS:  Most recent vital signs show height 5 feet 8 inches,  weight 300 pounds, pulse 98, BMI 45.61 and blood pressure 80/56. GENERAL:  The patient is pleasant, cooperative, awake, alert, oriented  x3, seated on the exam table, in no acute distress. EXTREMITIES:  Bilateral lower extremity exam shows no open wounds or  lesions.    Pulses are +2 in the posterior tibial arteries. Calves are  soft and nontender without evidence of DVT. Strength is maintained at  4/5 with bilateral hip flexion, 5/5 strength maintained with bilateral  knee extension, dorsiflexion, plantarflexion and great toe extension. Sensation is intact in the bilateral lower extremities through the L1-S1  dermatomes bilaterally to light touch. IMAGING:  CT imaging studies, CT scan of the lumbar spine dated  01/07/2020 [facility] shows right L4-S1 posterior  fusion with L4-L5 and L5-S1 degenerative/operative changes. MRI scan of the lumbar spine dated 10/22/2019 from [address] [place] [address] shows interval development of worsening degenerative disk disease  at L3-L4. This level is above the fusion. There is moderate stenosis  of the thecal sac and narrowing of the subarticular recess with less  severe bilateral foraminal stenosis, less severe right foraminal  stenosis and mild left foraminal stenosis at the L5-S1 level. There is  mild spinal stenosis noted with mild bilateral foraminal stenosis at the  L3-L2 level. ASSESSMENT:  Lumbar spinal stenosis with symptoms of lumbar  radiculopathy. PLAN:  The plan moving forward will include removal of hardware from  L4-S1, assessment of fusion and adjacent level laminectomy at L2-L4 with  L3-S1 posterior spinal fusion with instrumentation, allograft bone graft  and local bone graft. CONCLUSION:  The patient has developed worsening symptoms of low back  pain and leg radiculopathy over the past year. She has failed to  improve despite conservative treatments. She is electing to pursue  further operative management. We have received informed consent from  the patient.   We have gone over the risks and benefits of the surgery  which include postoperative pain, nerve root injury, blood loss  requiring transfusion, wound infection, spinal fluid leak, difficulty  with anesthesia, postoperative blood clot formation, hematoma formation. We have answered the patient's questions and concerns to her  satisfaction. Absolutely, no guarantees have been made for the results  of the surgery.         Domi Mitchell    D: 02/07/2020 14:25:58       T: 02/07/2020 22:30:01     [email or web address]  Job#: [id number]     Doc#: [id number]    CC:

## 2020-02-09 ENCOUNTER — HOSPITAL ENCOUNTER (INPATIENT)
Age: 60
LOS: 2 days | Discharge: HOME | DRG: 951 | End: 2020-02-11
Attending: ORTHOPAEDIC SURGERY | Admitting: ORTHOPAEDIC SURGERY
Payer: MEDICARE

## 2020-02-09 PROBLEM — M48.061 SPINAL STENOSIS, LUMBAR REGION WITHOUT NEUROGENIC CLAUDICATION: Status: ACTIVE | Noted: 2020-02-09

## 2020-02-09 LAB
ABO: NORMAL
ANTIBODY SCREEN: NORMAL
RH FACTOR: NORMAL

## 2020-02-09 PROCEDURE — 99283 EMERGENCY DEPT VISIT LOW MDM: CPT

## 2020-02-09 PROCEDURE — 6370000000 HC RX 637 (ALT 250 FOR IP): Performed by: PHYSICIAN ASSISTANT

## 2020-02-09 PROCEDURE — 1200000000 HC SEMI PRIVATE

## 2020-02-09 PROCEDURE — 86900 BLOOD TYPING SEROLOGIC ABO: CPT

## 2020-02-09 PROCEDURE — 99281 EMR DPT VST MAYX REQ PHY/QHP: CPT

## 2020-02-09 PROCEDURE — 86901 BLOOD TYPING SEROLOGIC RH(D): CPT

## 2020-02-09 PROCEDURE — 86850 RBC ANTIBODY SCREEN: CPT

## 2020-02-09 PROCEDURE — 36415 COLL VENOUS BLD VENIPUNCTURE: CPT

## 2020-02-09 RX ORDER — NICOTINE 21 MG/24HR
1 PATCH, TRANSDERMAL 24 HOURS TRANSDERMAL DAILY
Status: DISCONTINUED | OUTPATIENT
Start: 2020-02-09 | End: 2020-02-11 | Stop reason: HOSPADM

## 2020-02-09 RX ORDER — ONDANSETRON 2 MG/ML
4 INJECTION INTRAMUSCULAR; INTRAVENOUS EVERY 6 HOURS PRN
Status: DISCONTINUED | OUTPATIENT
Start: 2020-02-09 | End: 2020-02-11 | Stop reason: HOSPADM

## 2020-02-09 RX ORDER — SODIUM CHLORIDE 0.9 % (FLUSH) 0.9 %
10 SYRINGE (ML) INJECTION PRN
Status: DISCONTINUED | OUTPATIENT
Start: 2020-02-09 | End: 2020-02-10

## 2020-02-09 RX ORDER — HYDROCODONE BITARTRATE AND ACETAMINOPHEN 5; 325 MG/1; MG/1
1 TABLET ORAL EVERY 4 HOURS PRN
Status: DISCONTINUED | OUTPATIENT
Start: 2020-02-09 | End: 2020-02-11 | Stop reason: HOSPADM

## 2020-02-09 RX ORDER — NORTRIPTYLINE HYDROCHLORIDE 25 MG/1
50 CAPSULE ORAL NIGHTLY
Status: DISCONTINUED | OUTPATIENT
Start: 2020-02-09 | End: 2020-02-11 | Stop reason: HOSPADM

## 2020-02-09 RX ORDER — SODIUM CHLORIDE 0.9 % (FLUSH) 0.9 %
10 SYRINGE (ML) INJECTION EVERY 12 HOURS SCHEDULED
Status: DISCONTINUED | OUTPATIENT
Start: 2020-02-09 | End: 2020-02-10 | Stop reason: HOSPADM

## 2020-02-09 RX ORDER — SODIUM CHLORIDE 0.9 % (FLUSH) 0.9 %
10 SYRINGE (ML) INJECTION PRN
Status: DISCONTINUED | OUTPATIENT
Start: 2020-02-09 | End: 2020-02-10 | Stop reason: HOSPADM

## 2020-02-09 RX ORDER — HYDROCODONE BITARTRATE AND ACETAMINOPHEN 5; 325 MG/1; MG/1
1 TABLET ORAL EVERY 4 HOURS PRN
Status: ON HOLD | COMMUNITY
End: 2020-02-11 | Stop reason: HOSPADM

## 2020-02-09 RX ORDER — HYDROXYZINE 50 MG/1
50 TABLET, FILM COATED ORAL EVERY 4 HOURS PRN
Status: ON HOLD | COMMUNITY
End: 2020-03-19 | Stop reason: SDUPTHER

## 2020-02-09 RX ORDER — QUETIAPINE FUMARATE 100 MG/1
300 TABLET, FILM COATED ORAL NIGHTLY
Status: DISCONTINUED | OUTPATIENT
Start: 2020-02-09 | End: 2020-02-11 | Stop reason: HOSPADM

## 2020-02-09 RX ORDER — ALBUTEROL SULFATE 90 UG/1
1 AEROSOL, METERED RESPIRATORY (INHALATION) EVERY 6 HOURS PRN
Status: DISCONTINUED | OUTPATIENT
Start: 2020-02-09 | End: 2020-02-11 | Stop reason: HOSPADM

## 2020-02-09 RX ORDER — HYDROCODONE BITARTRATE AND ACETAMINOPHEN 5; 325 MG/1; MG/1
2 TABLET ORAL EVERY 4 HOURS PRN
Status: DISCONTINUED | OUTPATIENT
Start: 2020-02-09 | End: 2020-02-11 | Stop reason: HOSPADM

## 2020-02-09 RX ORDER — SODIUM CHLORIDE 0.9 % (FLUSH) 0.9 %
10 SYRINGE (ML) INJECTION EVERY 12 HOURS SCHEDULED
Status: DISCONTINUED | OUTPATIENT
Start: 2020-02-09 | End: 2020-02-10 | Stop reason: SDUPTHER

## 2020-02-09 RX ORDER — GABAPENTIN 600 MG/1
600 TABLET ORAL 2 TIMES DAILY
Status: ON HOLD | COMMUNITY
End: 2020-03-19 | Stop reason: HOSPADM

## 2020-02-09 RX ORDER — HYDROXYZINE HYDROCHLORIDE 25 MG/1
25 TABLET, FILM COATED ORAL EVERY 4 HOURS PRN
Status: DISCONTINUED | OUTPATIENT
Start: 2020-02-09 | End: 2020-02-10 | Stop reason: ALTCHOICE

## 2020-02-09 RX ORDER — GABAPENTIN 300 MG/1
900 CAPSULE ORAL 3 TIMES DAILY
Status: DISCONTINUED | OUTPATIENT
Start: 2020-02-09 | End: 2020-02-11 | Stop reason: HOSPADM

## 2020-02-09 RX ORDER — ACETAMINOPHEN 325 MG/1
650 TABLET ORAL EVERY 4 HOURS PRN
Status: DISCONTINUED | OUTPATIENT
Start: 2020-02-09 | End: 2020-02-10 | Stop reason: SDUPTHER

## 2020-02-09 RX ADMIN — GABAPENTIN 900 MG: 300 CAPSULE ORAL at 20:02

## 2020-02-09 RX ADMIN — HYDROCODONE BITARTRATE AND ACETAMINOPHEN 2 TABLET: 5; 325 TABLET ORAL at 20:02

## 2020-02-09 ASSESSMENT — ENCOUNTER SYMPTOMS
ABDOMINAL PAIN: 0
DIARRHEA: 0
VOMITING: 0
BACK PAIN: 1
SHORTNESS OF BREATH: 0
COLOR CHANGE: 0
NAUSEA: 0
CONSTIPATION: 0

## 2020-02-09 ASSESSMENT — PAIN - FUNCTIONAL ASSESSMENT
PAIN_FUNCTIONAL_ASSESSMENT: 0-10
PAIN_FUNCTIONAL_ASSESSMENT: PREVENTS OR INTERFERES SOME ACTIVE ACTIVITIES AND ADLS

## 2020-02-09 ASSESSMENT — PAIN DESCRIPTION - DESCRIPTORS: DESCRIPTORS: SHARP

## 2020-02-09 ASSESSMENT — PAIN SCALES - GENERAL
PAINLEVEL_OUTOF10: 5
PAINLEVEL_OUTOF10: 7

## 2020-02-09 NOTE — ED PROVIDER NOTES
[facility] [address] [address] EMERGENCY DEPT      CHIEF COMPLAINT       Chief Complaint   Patient presents with    Other     sent in for surgery       Nurses Notes reviewed and I agree except asnoted in the HPI. HISTORY OFPRESENT ILLNESS    Mervat Harris is a 64 y.o. female who presents to the emergency department for admission under Ortho Spine. Kay Jaramillo, Ortho Spine SANDOR with Dr. Jorge L Izaguirre, states that the patient is scheduled to have a lumbar laminectomy with removal of previous lumbar hardware performed tomorrow morning by Dr. Jorge L Izaguirre. However, the patient reportedly has a long drive to this hospital and there is poor weather today. For these reasons, Kay Jaramillo advises admission of the patient to this hospital under Ortho Spine services so that the surgery can be performed in the morning. This is the reason that she was sent to this department today. There is no additional evaluation or management needed from the ED at this time, as Kay Jaramillo said that he will place admission orders upon patient arrival.  The patient has a history of chronic back pain, chronic pain syndrome, schizoaffective disorder, and depression. There are no additional complaints at this time. REVIEW OF SYSTEMS      Review of Systems   Constitutional: Negative for chills, fatigue and fever. Respiratory: Negative for shortness of breath. Cardiovascular: Negative for chest pain. Gastrointestinal: Negative for abdominal pain, constipation, diarrhea, nausea and vomiting. Genitourinary: Negative for decreased urine volume, difficulty urinating and dysuria. Musculoskeletal: Positive for back pain. Negative for arthralgias, myalgias, neck pain and neck stiffness. Skin: Negative for color change and pallor. Neurological: Negative for weakness and numbness. PAST MEDICAL HISTORY    has a past medical history of Anxiety, Chronic low back pain, Depression, Hypertension, Schizoaffective disorder ([address] and Schizoaffective disorder ([facility] [address]     SURGICAL HISTORY not ill-appearing, toxic-appearing or diaphoretic. HENT:      Head: Normocephalic and atraumatic. Right Ear: External ear normal.      Left Ear: External ear normal.      Nose: Nose normal.      Mouth/Throat:      Mouth: Mucous membranes are moist.      Pharynx: Oropharynx is clear. Eyes:      General: No scleral icterus. Right eye: No discharge. Left eye: No discharge. Conjunctiva/sclera: Conjunctivae normal.      Pupils: Pupils are equal, round, and reactive to light. Neck:      Musculoskeletal: Normal range of motion. Cardiovascular:      Rate and Rhythm: Normal rate and regular rhythm. Heart sounds: Normal heart sounds. No murmur. No friction rub. No gallop. Pulmonary:      Effort: Pulmonary effort is normal. No respiratory distress. Breath sounds: Normal breath sounds. No stridor. No wheezing, rhonchi or rales. Chest:      Chest wall: No tenderness. Abdominal:      General: There is no distension. Palpations: Abdomen is soft. Musculoskeletal: Normal range of motion. General: No tenderness. Comments: There is no midline spinal or paraspinal tenderness of the cervical, thoracic, or lumbar spine. Skin:     General: Skin is warm and dry. Coloration: Skin is not pale. Findings: No erythema or rash. Neurological:      Mental Status: She is alert and oriented to person, place, and time. GCS: GCS eye subscore is 4. GCS verbal subscore is 5. GCS motor subscore is 6. Motor: No abnormal muscle tone. Coordination: Coordination normal.   Psychiatric:         Behavior: Behavior normal.         Thought Content: Thought content normal.               DIFFERENTIAL DIAGNOSIS:   Includes but not limited to: Paraspinal strain vs sprain vs paraspinal muscle spasm, herniated disc, sciatica, degenerative disc disease.  Low suspicion for: vertebral subluxation or fracture, cauda equina, discitis, epidural abscess, central canal compromise, or AAA based on history and physical exam.    DIAGNOSTIC RESULTS     EKG: All EKG's are interpreted by the Emergency Department Physician who either signs or Co-signsthis chart in the absence of a cardiologist.  None    RADIOLOGY: non-plain film images(s) such as CT, Ultrasound and MRI are read by the radiologist.  Plainradiographic images are visualized and preliminarily interpreted by the emergency physician unless otherwise stated below. No orders to display       LABS:   Labs Reviewed - No data to display    EMERGENCY DEPARTMENT COURSE:   Vitals:    Vitals:    02/09/20 1652   BP: (!) 146/111   Pulse: 102   Resp: 18   Temp: 98.4 °F (36.9 °C)   TempSrc: Oral   SpO2: 95%   Weight: 300 lb (136.1 kg)   Height: 5' 8\" (1.727 m)     The patient was seen and evaluated within the ED today to be admitted under Ortho Spine services for surgery tomorrow morning. Within the department, I observed the patient's BP on arrival to this department to be 146/111. On exam, I appreciated no midline spinal or paraspinal tenderness of the cervical, thoracic, or lumbar spine. I explained my proposed course of treatment to the patient, who was amenable to my treatment and admission decisions. Ngozi Darling, Ortho Spine SANDOR, was consulted and kindly agreed to admit the patient for further evaluation and management. The patient remained stable and maintained stable vital signs until admission to the floor. CRITICAL CARE:   None    CONSULTS:  Enrico Farmer PA-C - kindly agreed to admit the patient for further evaluation and management under Dr. Candelario Arn:  None    FINAL IMPRESSION      1. Intractable back pain          DISPOSITION/PLAN     1. Intractable back pain      Admit under Ortho Spine services    PATIENT REFERRED TO:  No follow-up provider specified.     DISCHARGE MEDICATIONS:  New Prescriptions    No medications on file       (Please note that portions of this note werecompleted with a voice recognition program.  Efforts were made to edit the dictations but occasionally words are mis-transcribed.)    SANDOR Contreras PA-C  02/09/20 4100

## 2020-02-09 NOTE — ED NOTES
Patient presents to the ED for admission to the hospital for her surgery in the morning.       Tosin Fields LPN  47/81/44 [id number]

## 2020-02-09 NOTE — ED TRIAGE NOTES
Pt to rm 06 per intake states that she was sent in by Dr Sandra Cosby for admission for her scheduled surgery tomorrow so they wouldn't have to get up so early to drive in. Pt laying prone in position of comfort on ER cart, talking w/ friend at bedside. Pt appears in no acute distress at this time, VSS.  Denies further needs or concerns

## 2020-02-10 ENCOUNTER — ANESTHESIA (OUTPATIENT)
Dept: OPERATING ROOM | Age: 60
DRG: 951 | End: 2020-02-10
Payer: MEDICARE

## 2020-02-10 ENCOUNTER — ANESTHESIA EVENT (OUTPATIENT)
Dept: OPERATING ROOM | Age: 60
DRG: 951 | End: 2020-02-10
Payer: MEDICARE

## 2020-02-10 ENCOUNTER — APPOINTMENT (OUTPATIENT)
Dept: GENERAL RADIOLOGY | Age: 60
DRG: 951 | End: 2020-02-10
Payer: MEDICARE

## 2020-02-10 VITALS
DIASTOLIC BLOOD PRESSURE: 107 MMHG | OXYGEN SATURATION: 96 % | RESPIRATION RATE: 1 BRPM | SYSTOLIC BLOOD PRESSURE: 147 MMHG | TEMPERATURE: 97.7 F

## 2020-02-10 PROCEDURE — 1200000000 HC SEMI PRIVATE

## 2020-02-10 PROCEDURE — 6370000000 HC RX 637 (ALT 250 FOR IP): Performed by: PHYSICIAN ASSISTANT

## 2020-02-10 PROCEDURE — 7100000001 HC PACU RECOVERY - ADDTL 15 MIN: Performed by: ORTHOPAEDIC SURGERY

## 2020-02-10 PROCEDURE — 3600000015 HC SURGERY LEVEL 5 ADDTL 15MIN: Performed by: ORTHOPAEDIC SURGERY

## 2020-02-10 PROCEDURE — 7100000000 HC PACU RECOVERY - FIRST 15 MIN: Performed by: ORTHOPAEDIC SURGERY

## 2020-02-10 PROCEDURE — 2709999900 HC NON-CHARGEABLE SUPPLY: Performed by: ORTHOPAEDIC SURGERY

## 2020-02-10 PROCEDURE — 3700000000 HC ANESTHESIA ATTENDED CARE: Performed by: ORTHOPAEDIC SURGERY

## 2020-02-10 PROCEDURE — 6360000002 HC RX W HCPCS: Performed by: PHYSICIAN ASSISTANT

## 2020-02-10 PROCEDURE — 2720000010 HC SURG SUPPLY STERILE: Performed by: ORTHOPAEDIC SURGERY

## 2020-02-10 PROCEDURE — 73100 X-RAY EXAM OF WRIST: CPT

## 2020-02-10 PROCEDURE — 2580000003 HC RX 258: Performed by: PHYSICIAN ASSISTANT

## 2020-02-10 PROCEDURE — 3600000005 HC SURGERY LEVEL 5 BASE: Performed by: ORTHOPAEDIC SURGERY

## 2020-02-10 PROCEDURE — 3700000001 HC ADD 15 MINUTES (ANESTHESIA): Performed by: ORTHOPAEDIC SURGERY

## 2020-02-10 PROCEDURE — 94761 N-INVAS EAR/PLS OXIMETRY MLT: CPT

## 2020-02-10 PROCEDURE — 6360000002 HC RX W HCPCS: Performed by: NURSE ANESTHETIST, CERTIFIED REGISTERED

## 2020-02-10 PROCEDURE — 2580000003 HC RX 258: Performed by: NURSE ANESTHETIST, CERTIFIED REGISTERED

## 2020-02-10 PROCEDURE — 2500000003 HC RX 250 WO HCPCS: Performed by: NURSE ANESTHETIST, CERTIFIED REGISTERED

## 2020-02-10 RX ORDER — ACETAMINOPHEN 325 MG/1
650 TABLET ORAL EVERY 4 HOURS PRN
Status: DISCONTINUED | OUTPATIENT
Start: 2020-02-10 | End: 2020-02-11 | Stop reason: HOSPADM

## 2020-02-10 RX ORDER — FENTANYL CITRATE 50 UG/ML
INJECTION, SOLUTION INTRAMUSCULAR; INTRAVENOUS PRN
Status: DISCONTINUED | OUTPATIENT
Start: 2020-02-10 | End: 2020-02-10 | Stop reason: SDUPTHER

## 2020-02-10 RX ORDER — SODIUM CHLORIDE 9 MG/ML
INJECTION, SOLUTION INTRAVENOUS CONTINUOUS
Status: DISCONTINUED | OUTPATIENT
Start: 2020-02-10 | End: 2020-02-11 | Stop reason: HOSPADM

## 2020-02-10 RX ORDER — PROPOFOL 10 MG/ML
INJECTION, EMULSION INTRAVENOUS PRN
Status: DISCONTINUED | OUTPATIENT
Start: 2020-02-10 | End: 2020-02-10 | Stop reason: SDUPTHER

## 2020-02-10 RX ORDER — GLYCOPYRROLATE 1 MG/5 ML
SYRINGE (ML) INTRAVENOUS PRN
Status: DISCONTINUED | OUTPATIENT
Start: 2020-02-10 | End: 2020-02-10 | Stop reason: SDUPTHER

## 2020-02-10 RX ORDER — DOCUSATE SODIUM 100 MG/1
100 CAPSULE, LIQUID FILLED ORAL 2 TIMES DAILY
Status: DISCONTINUED | OUTPATIENT
Start: 2020-02-10 | End: 2020-02-11 | Stop reason: HOSPADM

## 2020-02-10 RX ORDER — FENTANYL CITRATE 50 UG/ML
50 INJECTION, SOLUTION INTRAMUSCULAR; INTRAVENOUS EVERY 5 MIN PRN
Status: DISCONTINUED | OUTPATIENT
Start: 2020-02-10 | End: 2020-02-10 | Stop reason: HOSPADM

## 2020-02-10 RX ORDER — ROCURONIUM BROMIDE 10 MG/ML
INJECTION, SOLUTION INTRAVENOUS PRN
Status: DISCONTINUED | OUTPATIENT
Start: 2020-02-10 | End: 2020-02-10 | Stop reason: SDUPTHER

## 2020-02-10 RX ORDER — DEXAMETHASONE SODIUM PHOSPHATE 4 MG/ML
INJECTION, SOLUTION INTRA-ARTICULAR; INTRALESIONAL; INTRAMUSCULAR; INTRAVENOUS; SOFT TISSUE PRN
Status: DISCONTINUED | OUTPATIENT
Start: 2020-02-10 | End: 2020-02-10 | Stop reason: SDUPTHER

## 2020-02-10 RX ORDER — SODIUM CHLORIDE 0.9 % (FLUSH) 0.9 %
10 SYRINGE (ML) INJECTION EVERY 12 HOURS SCHEDULED
Status: DISCONTINUED | OUTPATIENT
Start: 2020-02-10 | End: 2020-02-11 | Stop reason: HOSPADM

## 2020-02-10 RX ORDER — GABAPENTIN 600 MG/1
900 TABLET ORAL 3 TIMES DAILY
Status: DISCONTINUED | OUTPATIENT
Start: 2020-02-10 | End: 2020-02-10 | Stop reason: ALTCHOICE

## 2020-02-10 RX ORDER — NEOSTIGMINE METHYLSULFATE 5 MG/5 ML
SYRINGE (ML) INTRAVENOUS PRN
Status: DISCONTINUED | OUTPATIENT
Start: 2020-02-10 | End: 2020-02-10 | Stop reason: SDUPTHER

## 2020-02-10 RX ORDER — DIAZEPAM 5 MG/1
10 TABLET ORAL EVERY 6 HOURS PRN
Status: DISCONTINUED | OUTPATIENT
Start: 2020-02-10 | End: 2020-02-11 | Stop reason: HOSPADM

## 2020-02-10 RX ORDER — SODIUM CHLORIDE 0.9 % (FLUSH) 0.9 %
10 SYRINGE (ML) INJECTION PRN
Status: DISCONTINUED | OUTPATIENT
Start: 2020-02-10 | End: 2020-02-11 | Stop reason: HOSPADM

## 2020-02-10 RX ORDER — LABETALOL 20 MG/4 ML (5 MG/ML) INTRAVENOUS SYRINGE
10 EVERY 10 MIN PRN
Status: DISCONTINUED | OUTPATIENT
Start: 2020-02-10 | End: 2020-02-10 | Stop reason: HOSPADM

## 2020-02-10 RX ORDER — CYCLOBENZAPRINE HCL 10 MG
10 TABLET ORAL 3 TIMES DAILY PRN
Status: DISCONTINUED | OUTPATIENT
Start: 2020-02-10 | End: 2020-02-11 | Stop reason: HOSPADM

## 2020-02-10 RX ORDER — ONDANSETRON 2 MG/ML
INJECTION INTRAMUSCULAR; INTRAVENOUS PRN
Status: DISCONTINUED | OUTPATIENT
Start: 2020-02-10 | End: 2020-02-10 | Stop reason: SDUPTHER

## 2020-02-10 RX ORDER — PROMETHAZINE HYDROCHLORIDE 25 MG/ML
12.5 INJECTION, SOLUTION INTRAMUSCULAR; INTRAVENOUS
Status: DISCONTINUED | OUTPATIENT
Start: 2020-02-10 | End: 2020-02-10 | Stop reason: HOSPADM

## 2020-02-10 RX ORDER — HYDROXYZINE HYDROCHLORIDE 25 MG/1
50 TABLET, FILM COATED ORAL EVERY 4 HOURS PRN
Status: DISCONTINUED | OUTPATIENT
Start: 2020-02-10 | End: 2020-02-11 | Stop reason: HOSPADM

## 2020-02-10 RX ORDER — SODIUM CHLORIDE 9 MG/ML
INJECTION, SOLUTION INTRAVENOUS CONTINUOUS PRN
Status: DISCONTINUED | OUTPATIENT
Start: 2020-02-10 | End: 2020-02-10 | Stop reason: SDUPTHER

## 2020-02-10 RX ADMIN — FENTANYL CITRATE 100 MCG: 50 INJECTION INTRAMUSCULAR; INTRAVENOUS at 13:32

## 2020-02-10 RX ADMIN — DIAZEPAM 10 MG: 5 TABLET ORAL at 22:14

## 2020-02-10 RX ADMIN — FENTANYL CITRATE 50 MCG: 50 INJECTION INTRAMUSCULAR; INTRAVENOUS at 13:27

## 2020-02-10 RX ADMIN — HYDROXYZINE HYDROCHLORIDE 50 MG: 25 TABLET ORAL at 14:46

## 2020-02-10 RX ADMIN — GABAPENTIN 900 MG: 300 CAPSULE ORAL at 14:45

## 2020-02-10 RX ADMIN — Medication 0.8 MG: at 13:21

## 2020-02-10 RX ADMIN — FENTANYL CITRATE 100 MCG: 50 INJECTION INTRAMUSCULAR; INTRAVENOUS at 12:07

## 2020-02-10 RX ADMIN — HYDROCODONE BITARTRATE AND ACETAMINOPHEN 2 TABLET: 5; 325 TABLET ORAL at 04:27

## 2020-02-10 RX ADMIN — DOCUSATE SODIUM 100 MG: 100 CAPSULE, LIQUID FILLED ORAL at 14:45

## 2020-02-10 RX ADMIN — SODIUM CHLORIDE: 9 INJECTION, SOLUTION INTRAVENOUS at 12:04

## 2020-02-10 RX ADMIN — NORTRIPTYLINE HYDROCHLORIDE 50 MG: 25 CAPSULE ORAL at 00:16

## 2020-02-10 RX ADMIN — Medication 4 MG: at 13:21

## 2020-02-10 RX ADMIN — DEXTROSE MONOHYDRATE 3 G: 50 INJECTION, SOLUTION INTRAVENOUS at 11:43

## 2020-02-10 RX ADMIN — DEXAMETHASONE SODIUM PHOSPHATE 4 MG: 4 INJECTION, SOLUTION INTRAMUSCULAR; INTRAVENOUS at 12:15

## 2020-02-10 RX ADMIN — HYDROCODONE BITARTRATE AND ACETAMINOPHEN 2 TABLET: 5; 325 TABLET ORAL at 14:45

## 2020-02-10 RX ADMIN — PROPOFOL 200 MG: 10 INJECTION, EMULSION INTRAVENOUS at 12:07

## 2020-02-10 RX ADMIN — ONDANSETRON HYDROCHLORIDE 4 MG: 4 INJECTION, SOLUTION INTRAMUSCULAR; INTRAVENOUS at 13:19

## 2020-02-10 RX ADMIN — HYDROCODONE BITARTRATE AND ACETAMINOPHEN 2 TABLET: 5; 325 TABLET ORAL at 00:16

## 2020-02-10 RX ADMIN — HYDROCODONE BITARTRATE AND ACETAMINOPHEN 2 TABLET: 5; 325 TABLET ORAL at 20:52

## 2020-02-10 RX ADMIN — DOCUSATE SODIUM 100 MG: 100 CAPSULE, LIQUID FILLED ORAL at 20:52

## 2020-02-10 RX ADMIN — ROCURONIUM BROMIDE 50 MG: 10 INJECTION INTRAVENOUS at 12:07

## 2020-02-10 RX ADMIN — GABAPENTIN 900 MG: 300 CAPSULE ORAL at 20:53

## 2020-02-10 RX ADMIN — QUETIAPINE FUMARATE 300 MG: 100 TABLET ORAL at 00:15

## 2020-02-10 ASSESSMENT — PAIN SCALES - GENERAL
PAINLEVEL_OUTOF10: 7
PAINLEVEL_OUTOF10: 7
PAINLEVEL_OUTOF10: 8
PAINLEVEL_OUTOF10: 9
PAINLEVEL_OUTOF10: 9
PAINLEVEL_OUTOF10: 10
PAINLEVEL_OUTOF10: 9
PAINLEVEL_OUTOF10: 0

## 2020-02-10 ASSESSMENT — PULMONARY FUNCTION TESTS
PIF_VALUE: 20
PIF_VALUE: 15
PIF_VALUE: 21
PIF_VALUE: 15
PIF_VALUE: 20
PIF_VALUE: 23
PIF_VALUE: 21
PIF_VALUE: 20
PIF_VALUE: 23
PIF_VALUE: 20
PIF_VALUE: 18
PIF_VALUE: 20
PIF_VALUE: 21
PIF_VALUE: 15
PIF_VALUE: 20
PIF_VALUE: 21
PIF_VALUE: 20
PIF_VALUE: 15
PIF_VALUE: 4
PIF_VALUE: 21
PIF_VALUE: 18
PIF_VALUE: 0
PIF_VALUE: 21
PIF_VALUE: 21
PIF_VALUE: 20
PIF_VALUE: 21
PIF_VALUE: 20
PIF_VALUE: 17
PIF_VALUE: 21
PIF_VALUE: 20
PIF_VALUE: 20
PIF_VALUE: 21
PIF_VALUE: 20
PIF_VALUE: 21
PIF_VALUE: 21
PIF_VALUE: 20
PIF_VALUE: 1
PIF_VALUE: 20
PIF_VALUE: 15
PIF_VALUE: 23
PIF_VALUE: 15
PIF_VALUE: 20
PIF_VALUE: 16
PIF_VALUE: 15
PIF_VALUE: 1
PIF_VALUE: 21
PIF_VALUE: 18
PIF_VALUE: 20
PIF_VALUE: 21
PIF_VALUE: 19
PIF_VALUE: 20
PIF_VALUE: 21
PIF_VALUE: 20
PIF_VALUE: 20
PIF_VALUE: 17
PIF_VALUE: 15
PIF_VALUE: 21
PIF_VALUE: 2
PIF_VALUE: 16
PIF_VALUE: 19
PIF_VALUE: 21
PIF_VALUE: 28
PIF_VALUE: 20
PIF_VALUE: 21
PIF_VALUE: 19
PIF_VALUE: 2
PIF_VALUE: 19
PIF_VALUE: 21
PIF_VALUE: 20
PIF_VALUE: 17
PIF_VALUE: 17
PIF_VALUE: 18
PIF_VALUE: 20

## 2020-02-10 ASSESSMENT — PAIN DESCRIPTION - LOCATION: LOCATION: BACK

## 2020-02-10 ASSESSMENT — PAIN DESCRIPTION - ORIENTATION: ORIENTATION: LOWER

## 2020-02-10 ASSESSMENT — PAIN DESCRIPTION - PAIN TYPE: TYPE: SURGICAL PAIN

## 2020-02-10 NOTE — PROGRESS NOTES
Pharmacy Medication History Note      List of current medications patient is taking is complete. Source of information: patient, OARRS    Changes made to medication list:  Medications removed (include reason, ex. therapy complete or physician discontinued): Ibuprofen - patient states she stopped because it wasn't helping her pain  Naproxen - patient states she stopped because it wasn't helping her pain    Medications added/doses adjusted:  Norco 3/325 mg q4h prn  Hydroxyzine 25 mg changed to 50 mg    Other notes (ex. Recent course of antibiotics, Coumadin dosing):  Denies use of other OTC or herbal medications.           Electronically signed by Aide Fernandes, [address] [place] [address] on 2/9/2020 at 7:27 PM

## 2020-02-10 NOTE — CARE COORDINATION
2/10/20, 11:00 AM  DISCHARGE PLANNING EVALUATION:    [address]       [facility]   [facility]: [facility]- Reason for admit: Spinal stenosis, lumbar region without neurogenic claudication [M48.061] Status: IP  Admit order signed?: yes  PMH:  has a past medical history of Anxiety, Chronic low back pain, Depression, Hypertension, Schizoaffective disorder ([facility] [address] and Schizoaffective disorder ([facility] [address] Procedure: 2/10 Planning removal of hardware from  L4-S1, assessment of fusion and adjacent level laminectomy at L2-L4 with  L3-S1 posterior spinal fusion with instrumentation, allograft bone graft  and local bone graft. Pertinent abnormal Imaging:none  Medications:  Scheduled Meds:   HYDROmorphone  0.25 mg Intravenous Once    nortriptyline  50 mg Oral Nightly    QUEtiapine  300 mg Oral Nightly    sodium chloride flush  10 mL Intravenous 2 times per day    gabapentin  900 mg Oral TID    nicotine  1 patch Transdermal Daily    ceFAZolin (ANCEF) IVPB  3 g Intravenous On Call to OR    sodium chloride flush  10 mL Intravenous 2 times per day     Continuous Infusions:   Pertinent Info/Orders/Treatment Plan:  Ortho following. IV ancef. Pain control. Plan for PT/OT post-op. Diet: Diet NPO Effective Now   Smoking status:  reports that she has been smoking cigarettes. She has a 0.25 pack-year smoking history.  She has never used smokeless tobacco.   PCP: EZE Velez CNP  Readmission 30 days or less: no  Readmission Risk Score: 15%    Discharge Planning Evaluation  Current Residence:  Private Residence  Living Arrangements:  Family Members(sister)   Support Systems:  Children, Family Members, Friends/Neighbors, [employer]  Current Services PTA:     Potential Assistance Needed:  N/A  Potential Assistance Purchasing Medications:  No  Does patient want to participate in local refill/ meds to beds program?  No  Type of Home Care Services:  None  Patient expects to be

## 2020-02-10 NOTE — H&P
[address] [place] [address]                        PREOPERATIVE HISTORY AND PHYSICAL     PATIENT NAME: Mahnaz Powers                        :        1960  MED REC NO:   [id number]                       [facility]#: [id number]  ADMIT DATE: 2020  PROVIDER:     Susie Barlow PA-C        CHIEF COMPLAINT:  Low back pain, bilateral leg pain and numbness, status  post right hand side L4-L5 hemilaminectomy and posterior spinal fusion  from the year  done at [facility]:  The patient is a 53-year-old female who  presents to the office today for evaluation of worsening low back pain  and symptoms of bilateral leg pain and numbness. She is a patient who  underwent a previous lumbar spine operation in the year , which  included right hand side L4-L5 hemilaminectomy and posterior spinal  fusion. This was done due to significant weakness on the right hand  side and drop foot. She did not improve after this operation. Over the  past year, she has developed worsening symptoms of back and leg pain,  greater on the left hand side with symptoms of pain, tingling and  numbness to the left anterior thigh. These symptoms have progressively  worsened and she has found it very difficult to walk and stand due to  the leg weakness. Her pain has been very significant and has failed to  improve despite the use of conservative treatment using Motrin,  Flexeril, heat, ice, physical therapy, exercise and chiropractic care.    She has now elected to pursue further operative management including  removal of hardware and adjacent level decompression at the levels of  L2-L3 and L3-L4 above her previous fusion.     She has been medically cleared by Lance Harmon, certified nurse  practitioner and her family provider to undergo this operation.     DRUG ALLERGIES:  CODEINE and MORPHINE causing increased nausea.     PAST MEDICAL HISTORY:  Significant for hypertension, anxiety, depression  and drug abuse.     CURRENT MEDICATIONS:  Includes Seroquel, hydroxyzine and Norco.     PAST SURGICAL HISTORY:  Includes a L4-S1 hemilaminectomy and fusion  performed in 2013 in [place]. In 1993, she had tubal ligation, 1978  cyst removed off her tail bone, and 1976 tonsillectomy.     SOCIAL HISTORY:  Smoking status:  She is a current smoker, smoking a  pack and a half of cigarettes per day. She currently lives in a private  home with her sister and her sister's . She is currently not  working. 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 She reports she has been clean  now for over six months.     REVIEW OF SYSTEMS:  GENERAL:  Denies fever, fatigue or chills. RESPIRATORY:  Denies shortness of breath, productive cough, wheezing. CARDIOVASCULAR:  Denies chest pain, palpitations, leg swelling. GASTROINTESTINAL:  Denies nausea, vomiting, diarrhea, abdominal pain. GENITOURINARY:  Denies dysuria or bladder incontinence. NEUROLOGIC:  Denies seizure, blackouts or fainting. Significant for  weakness and numbness. MUSCULOSKELETAL:  Significant for back pain, leg pain, joint pain,  muscle pain and neck pain.     PHYSICAL EXAMINATION:  VITAL SIGNS:  Most recent vital signs show height 5 feet 8 inches,  weight 300 pounds, pulse 98, BMI 45.61 and blood pressure 80/56. GENERAL:  The patient is pleasant, cooperative, awake, alert, oriented  x3, seated on the exam table, in no acute distress. EXTREMITIES:  Bilateral lower extremity exam shows no open wounds or  lesions. Pulses are +2 in the posterior tibial arteries. Calves are  soft and nontender without evidence of DVT. Strength is maintained at  4/5 with bilateral hip flexion, 5/5 strength maintained with bilateral  knee extension, dorsiflexion, plantarflexion and great toe extension.    Sensation is intact in the bilateral lower extremities through the L1-S1  dermatomes bilaterally to light touch.     IMAGING:  CT imaging studies, CT scan of the lumbar spine dated  01/07/2020 from [facility] shows right L4-S1 posterior  fusion with L4-L5 and L5-S1 degenerative/operative changes.     MRI scan of the lumbar spine dated 10/22/2019 from [facility] shows interval development of worsening degenerative disk disease  at L3-L4. This level is above the fusion. There is moderate stenosis  of the thecal sac and narrowing of the subarticular recess with less  severe bilateral foraminal stenosis, less severe right foraminal  stenosis and mild left foraminal stenosis at the L5-S1 level. There is  mild spinal stenosis noted with mild bilateral foraminal stenosis at the  L3-L2 level.     ASSESSMENT:  Lumbar spinal stenosis with symptoms of lumbar  radiculopathy.     PLAN:  The plan moving forward will include removal of hardware from  L4-S1, assessment of fusion and adjacent level laminectomy at L2-L4 with  L3-S1 posterior spinal fusion with instrumentation, allograft bone graft  and local bone graft.     CONCLUSION:  The patient has developed worsening symptoms of low back  pain and leg radiculopathy over the past year. She has failed to  improve despite conservative treatments. She is electing to pursue  further operative management. We have received informed consent from  the patient. We have gone over the risks and benefits of the surgery  which include postoperative pain, nerve root injury, blood loss  requiring transfusion, wound infection, spinal fluid leak, difficulty  with anesthesia, postoperative blood clot formation, hematoma formation. We have answered the patient's questions and concerns to her  satisfaction.   Absolutely, no guarantees have been made for the results  of the surgery.           TENISHA MERCER PA-C     D: 02/07/2020 14:25:58       T: 02/07/2020 22:30:01     [email or web address]  Job#: [id number]     Doc#: [id number]     CC:

## 2020-02-10 NOTE — ANESTHESIA PRE PROCEDURE
at 02/10/20 0016    QUEtiapine (SEROQUEL) tablet 300 mg  300 mg Oral Nightly Karlene Zepeda PA-C   300 mg at 02/10/20 0015    sodium chloride flush 0.9 % injection 10 mL  10 mL Intravenous 2 times per day Karlene Zepeda PA-C        sodium chloride flush 0.9 % injection 10 mL  10 mL Intravenous PRN Karlene Zepeda PA-C        magnesium hydroxide (MILK OF MAGNESIA) 400 MG/5ML suspension 30 mL  30 mL Oral Daily PRN Karlene Zepeda PA-C        ondansetron [facility]) injection 4 mg  4 mg Intravenous Q6H PRN Karlene Zepeda PA-C        acetaminophen (TYLENOL) tablet 650 mg  650 mg Oral Q4H PRN Karlene Zepeda PA-C        HYDROcodone-acetaminophen (NORCO) 5-325 MG per tablet 1 tablet  1 tablet Oral Q4H PRN Karlene Zepeda PA-C        Or    HYDROcodone-acetaminophen (NORCO) 5-325 MG per tablet 2 tablet  2 tablet Oral Q4H PRN Karlene Zepeda PA-C   2 tablet at 02/10/20 0427    gabapentin (NEURONTIN) capsule 900 mg  900 mg Oral TID Alecia Kinsey PA-C   900 mg at 02/09/20 2002    nicotine (NICODERM CQ) 21 MG/24HR 1 patch  1 patch Transdermal Daily Alecia Kinsey PA-C   1 patch at 02/10/20 0016    ceFAZolin (ANCEF) 3 g in dextrose 5 % 100 mL IVPB  3 g Intravenous On Call to [address] [place], [place] [address]SANDOR 200 mL/hr at 02/10/20 1143 3 g at 02/10/20 1143    sodium chloride flush 0.9 % injection 10 mL  10 mL Intravenous 2 times per day Dominick SANDOR Cary        sodium chloride flush 0.9 % injection 10 mL  10 mL Intravenous PRN Dominick SANDOR Cary           Allergies:     Allergies   Allergen Reactions    Codeine Nausea And Vomiting    [place] Starch Rash     Corn starch powder    Morphine Nausea And Vomiting       Problem List:    Patient Active Problem List   Diagnosis Code    Suicide attempt by polypharm overdose ([facility] T65.92XA    Depression F32.9    Chronic pain syndrome G89.4    Schizoaffective disorder, depressive type ([facility] F25.1    Acute cystitis without hematuria N30.00    Continuous leakage of urine N39.45    Alcohol abuse F10.10    Abnormal EKG R94.31    Affective psychosis, bipolar (HCC) F31.9    MDD (major depressive disorder), recurrent severe, without psychosis ([facility] F33.2    Spinal stenosis, lumbar region without neurogenic claudication M48.061       Past Medical History:        Diagnosis Date    Anxiety     Chronic low back pain     Depression     Hypertension     Sch[facility]oaffective disorder ([facility]     Schizoaffective disorder ([facility] [address] [address]        Past Surgical History:        Procedure Laterality Date    ANKLE SURGERY      BACK SURGERY  nov 2013    at [address]      off end of tailbone    PILONIDAL CYST EXCISION      TONSILLECTOMY      TUBAL LIGATION         Social History:    Social History     Tobacco Use    Smoking status: Current Every Day Smoker     Packs/day: 0.25     Years: 1.00     Pack years: 0.25     Types: Cigarettes    Smokeless tobacco: Never Used   Substance Use Topics    Alcohol use: Not Currently     Alcohol/week: 6.0 standard drinks     Types: 6 Cans of beer per week     Comment: last used alcohol 6/12/2016                                Ready to quit: Not Answered  Counseling given: Not Answered      Vital Signs (Current):   Vitals:    02/09/20 1932 02/10/20 0011 02/10/20 0425 02/10/20 0926   BP: (!) 144/78 130/75 133/66 104/65   Pulse: 83 95 94 87   Resp: 16 18 18 18   Temp: 98.1 °F (36.7 °C) 98 °F (36.7 °C) 96.2 °F (35.7 °C) 98 °F (36.7 °C)   TempSrc: Oral Oral Oral Oral   SpO2: 95% 92% 94% 94%   Weight: 288 lb 12.8 oz (131 kg)      Height: 5' 8\" (1.727 m)                                                 BP Readings from Last 3 Encounters:   02/10/20 104/65   12/30/19 (!) 125/59   11/25/19 (!) 154/81       NPO Status:                                                                                 BMI:   Wt Readings from Last 3 Encounters:   02/09/20 288 lb 12.8 oz (131 kg)   12/29/19 300 lb (136.1 kg)   11/25/19 300 lb (136.1 kg)     Body mass index is 43.91 kg/m².     CBC:   Lab

## 2020-02-10 NOTE — PROGRESS NOTES
Pt admitted to  7K1 from ED. Complaints: back pain. No IV site in place. Vital signs obtained. Assessment and data collection initiated. Two nurse skin assessment performed by Fadumo CASTILLO and Braxton Nguyen. Oriented to room. Explained patients right to have family, representative or physician notified of their admission. Patient has Declined for physician to be notified. Patient has declined for family/representative to be notified. The patient is interested in [facility]. Samiras meds to beds program?:  No    Policies and procedures for  explained. All questions answered with no further questions at this time. Fall prevention and safety brochure discussed with patient. Bed alarm on. Call light in reach. The best day to schedule a follow up Dr appointment is:  Monday a.m.

## 2020-02-10 NOTE — ED NOTES
Called 7K and informed Dae Pitt that the patient is on their way to the unit. Patient transported via wheelchair in a stable condition.      Real President  02/09/20 1915

## 2020-02-10 NOTE — PROGRESS NOTES
1326: pt arrived to pacu. Procedure cancelled. Pt with right arm swelling. Palpable radial pulse. Hernandez with araceli urine.  Pt laying on side no needs expressed   1342: RN called report to larisa CASTILLO on 7K   1350: Family updated

## 2020-02-10 NOTE — PLAN OF CARE
Problem: Falls - Risk of:  Goal: Will remain free from falls  Description  Will remain free from falls  Outcome: Met This Shift  Note:   Patient has not had any falls during this shift. Bed in lowest position with wheels locked and call light in reach. Patient compliant with using call light to request assistance from staff when needed. Fall prevention measures in place and patient compliant. Hourly rounding in place and bed alarm on. Problem: Cardiovascular  Goal: No DVT, peripheral vascular complications  Outcome: Met This Shift  Note:   No signs or symptoms of DVT noted to patient this shift. Negative Salvador's sign bialterally. No redness, swelling, or warmth noted to bilateral calves. Patient compliant with wearing SCDs while in bed. Will continue to monitor. Problem: Pain Control  Goal: Maintain pain level at or below patient's acceptable level (or 5 if patient is unable to determine acceptable level)  Outcome: Ongoing  Flowsheets (Taken 2/9/2020 3744)  Patient's Stated Pain Goal: 4  Note:   Patient with stated pain goal of 4/10. Patient verbalizing pain to her lower back and down her legs. Patient taking norco every 4 hours PRN for pain and voices relief of pain with PO pain medication. Patient is able to reposition herself in bed PRN for comfort. Will continue to assess patient for pain and administer pain as appropriate. Problem: Discharge Planning:  Goal: Discharged to appropriate level of care  Description  Discharged to appropriate level of care  Outcome: Ongoing  Note:   Patient states she would like to go to TCU at discharge.  to assist patient with discharge needs. Problem: Infection - Surgical Site:  Goal: Will show no infection signs and symptoms  Description  Will show no infection signs and symptoms  Outcome: Ongoing  Note:   Patient has not had surgery. Surgery planned for 2/10 at 1000.       Problem: Mobility - Impaired:  Goal: Mobility will improve to maximum level  Description  Mobility will improve to maximum level  Outcome: Ongoing  Note:   Patient up with one assist and tolerating well. Problem: Daily Care:  Goal: Daily care needs are met  Description  Daily care needs are met  Outcome: Ongoing  Note:   Patient requires assistance from staff with ADLs. Patient is able to use call light to request assistance when needed. Will continue to address patient's needs as they arise. Problem: Pain:  Goal: Pain level will decrease  Description  Pain level will decrease  Outcome: Completed  Goal: Control of acute pain  Description  Control of acute pain  Outcome: Completed  Goal: Control of chronic pain  Description  Control of chronic pain  Outcome: Completed     Problem: Falls - Risk of:  Goal: Absence of physical injury  Description  Absence of physical injury  Outcome: Completed  Note:   No accidental physical injury to patient this shift. Bed in lowest position with wheels locked and call light in reach. Care plan reviewed with patient. Patient verbalizes understanding of plan of care and contributes to goal setting.

## 2020-02-11 ENCOUNTER — ANESTHESIA (OUTPATIENT)
Dept: OPERATING ROOM | Age: 60
DRG: 951 | End: 2020-02-11
Payer: MEDICARE

## 2020-02-11 ENCOUNTER — ANESTHESIA EVENT (OUTPATIENT)
Dept: OPERATING ROOM | Age: 60
DRG: 951 | End: 2020-02-11
Payer: MEDICARE

## 2020-02-11 VITALS
RESPIRATION RATE: 1 BRPM | DIASTOLIC BLOOD PRESSURE: 59 MMHG | OXYGEN SATURATION: 97 % | SYSTOLIC BLOOD PRESSURE: 119 MMHG

## 2020-02-11 VITALS
SYSTOLIC BLOOD PRESSURE: 136 MMHG | RESPIRATION RATE: 16 BRPM | HEART RATE: 96 BPM | HEIGHT: 68 IN | BODY MASS INDEX: 43.77 KG/M2 | WEIGHT: 288.8 LBS | TEMPERATURE: 98.1 F | DIASTOLIC BLOOD PRESSURE: 60 MMHG | OXYGEN SATURATION: 94 %

## 2020-02-11 LAB
ANION GAP SERPL CALCULATED.3IONS-SCNC: 8 MEQ/L (ref 8–16)
APTT: 32.9 SECONDS (ref 22–38)
BUN BLDV-MCNC: 14 MG/DL (ref 7–22)
CALCIUM SERPL-MCNC: 9 MG/DL (ref 8.5–10.5)
CHLORIDE BLD-SCNC: 105 MEQ/L (ref 98–111)
CO2: 27 MEQ/L (ref 23–33)
CREAT SERPL-MCNC: 0.8 MG/DL (ref 0.4–1.2)
ERYTHROCYTE [DISTWIDTH] IN BLOOD BY AUTOMATED COUNT: 12.4 % (ref 11.5–14.5)
ERYTHROCYTE [DISTWIDTH] IN BLOOD BY AUTOMATED COUNT: 39.8 FL (ref 35–45)
GFR SERPL CREATININE-BSD FRML MDRD: 74 ML/MIN/1.73M2
GLUCOSE BLD-MCNC: 161 MG/DL (ref 70–108)
HCT VFR BLD CALC: 34.9 % (ref 37–47)
HEMOGLOBIN: 11.7 GM/DL (ref 12–16)
INR BLD: 0.91 (ref 0.85–1.13)
MCH RBC QN AUTO: 29.3 PG (ref 26–33)
MCHC RBC AUTO-ENTMCNC: 33.5 GM/DL (ref 32.2–35.5)
MCV RBC AUTO: 87.5 FL (ref 81–99)
PLATELET # BLD: 252 THOU/MM3 (ref 130–400)
PMV BLD AUTO: 9.8 FL (ref 9.4–12.4)
POTASSIUM REFLEX MAGNESIUM: 4.3 MEQ/L (ref 3.5–5.2)
RBC # BLD: 3.99 MILL/MM3 (ref 4.2–5.4)
SODIUM BLD-SCNC: 140 MEQ/L (ref 135–145)
WBC # BLD: 8.8 THOU/MM3 (ref 4.8–10.8)

## 2020-02-11 PROCEDURE — 85027 COMPLETE CBC AUTOMATED: CPT

## 2020-02-11 PROCEDURE — 36415 COLL VENOUS BLD VENIPUNCTURE: CPT

## 2020-02-11 PROCEDURE — 6360000002 HC RX W HCPCS: Performed by: ANESTHESIOLOGY

## 2020-02-11 PROCEDURE — 35206 REPAIR BLOOD VESSEL DIR UXTR: CPT | Performed by: THORACIC SURGERY (CARDIOTHORACIC VASCULAR SURGERY)

## 2020-02-11 PROCEDURE — 3600000003 HC SURGERY LEVEL 3 BASE: Performed by: THORACIC SURGERY (CARDIOTHORACIC VASCULAR SURGERY)

## 2020-02-11 PROCEDURE — 03CB0ZZ EXTIRPATION OF MATTER FROM RIGHT RADIAL ARTERY, OPEN APPROACH: ICD-10-PCS | Performed by: THORACIC SURGERY (CARDIOTHORACIC VASCULAR SURGERY)

## 2020-02-11 PROCEDURE — 80048 BASIC METABOLIC PNL TOTAL CA: CPT

## 2020-02-11 PROCEDURE — 2709999900 HC NON-CHARGEABLE SUPPLY: Performed by: THORACIC SURGERY (CARDIOTHORACIC VASCULAR SURGERY)

## 2020-02-11 PROCEDURE — 3700000001 HC ADD 15 MINUTES (ANESTHESIA): Performed by: THORACIC SURGERY (CARDIOTHORACIC VASCULAR SURGERY)

## 2020-02-11 PROCEDURE — 94760 N-INVAS EAR/PLS OXIMETRY 1: CPT

## 2020-02-11 PROCEDURE — 85610 PROTHROMBIN TIME: CPT

## 2020-02-11 PROCEDURE — 7100000001 HC PACU RECOVERY - ADDTL 15 MIN: Performed by: THORACIC SURGERY (CARDIOTHORACIC VASCULAR SURGERY)

## 2020-02-11 PROCEDURE — 3600000013 HC SURGERY LEVEL 3 ADDTL 15MIN: Performed by: THORACIC SURGERY (CARDIOTHORACIC VASCULAR SURGERY)

## 2020-02-11 PROCEDURE — 6360000002 HC RX W HCPCS: Performed by: REGISTERED NURSE

## 2020-02-11 PROCEDURE — 6360000002 HC RX W HCPCS: Performed by: PHYSICIAN ASSISTANT

## 2020-02-11 PROCEDURE — 6370000000 HC RX 637 (ALT 250 FOR IP): Performed by: PHYSICIAN ASSISTANT

## 2020-02-11 PROCEDURE — 2580000003 HC RX 258: Performed by: PHYSICIAN ASSISTANT

## 2020-02-11 PROCEDURE — 3700000000 HC ANESTHESIA ATTENDED CARE: Performed by: THORACIC SURGERY (CARDIOTHORACIC VASCULAR SURGERY)

## 2020-02-11 PROCEDURE — 85730 THROMBOPLASTIN TIME PARTIAL: CPT

## 2020-02-11 PROCEDURE — APPSS60 APP SPLIT SHARED TIME 46-60 MINUTES: Performed by: PHYSICIAN ASSISTANT

## 2020-02-11 PROCEDURE — 2500000003 HC RX 250 WO HCPCS: Performed by: REGISTERED NURSE

## 2020-02-11 PROCEDURE — 7100000000 HC PACU RECOVERY - FIRST 15 MIN: Performed by: THORACIC SURGERY (CARDIOTHORACIC VASCULAR SURGERY)

## 2020-02-11 RX ORDER — SODIUM CHLORIDE 0.9 % (FLUSH) 0.9 %
10 SYRINGE (ML) INJECTION PRN
Status: DISCONTINUED | OUTPATIENT
Start: 2020-02-11 | End: 2020-02-11 | Stop reason: HOSPADM

## 2020-02-11 RX ORDER — ONDANSETRON 2 MG/ML
4 INJECTION INTRAMUSCULAR; INTRAVENOUS
Status: DISCONTINUED | OUTPATIENT
Start: 2020-02-11 | End: 2020-02-11 | Stop reason: HOSPADM

## 2020-02-11 RX ORDER — SODIUM CHLORIDE 0.9 % (FLUSH) 0.9 %
10 SYRINGE (ML) INJECTION PRN
Status: CANCELLED | OUTPATIENT
Start: 2020-02-11

## 2020-02-11 RX ORDER — MIDAZOLAM HYDROCHLORIDE 1 MG/ML
INJECTION INTRAMUSCULAR; INTRAVENOUS PRN
Status: DISCONTINUED | OUTPATIENT
Start: 2020-02-11 | End: 2020-02-11 | Stop reason: SDUPTHER

## 2020-02-11 RX ORDER — FENTANYL CITRATE 50 UG/ML
25 INJECTION, SOLUTION INTRAMUSCULAR; INTRAVENOUS EVERY 5 MIN PRN
Status: DISCONTINUED | OUTPATIENT
Start: 2020-02-11 | End: 2020-02-11 | Stop reason: HOSPADM

## 2020-02-11 RX ORDER — HYDROMORPHONE HCL 110MG/55ML
PATIENT CONTROLLED ANALGESIA SYRINGE INTRAVENOUS PRN
Status: DISCONTINUED | OUTPATIENT
Start: 2020-02-11 | End: 2020-02-11 | Stop reason: SDUPTHER

## 2020-02-11 RX ORDER — FENTANYL CITRATE 50 UG/ML
INJECTION, SOLUTION INTRAMUSCULAR; INTRAVENOUS
Status: DISCONTINUED
Start: 2020-02-11 | End: 2020-02-11 | Stop reason: HOSPADM

## 2020-02-11 RX ORDER — KETAMINE HCL IN NACL, ISO-OSM 100MG/10ML
SYRINGE (ML) INJECTION PRN
Status: DISCONTINUED | OUTPATIENT
Start: 2020-02-11 | End: 2020-02-11 | Stop reason: SDUPTHER

## 2020-02-11 RX ORDER — LABETALOL 20 MG/4 ML (5 MG/ML) INTRAVENOUS SYRINGE
5 EVERY 10 MIN PRN
Status: DISCONTINUED | OUTPATIENT
Start: 2020-02-11 | End: 2020-02-11 | Stop reason: HOSPADM

## 2020-02-11 RX ORDER — FENTANYL CITRATE 50 UG/ML
50 INJECTION, SOLUTION INTRAMUSCULAR; INTRAVENOUS EVERY 5 MIN PRN
Status: DISCONTINUED | OUTPATIENT
Start: 2020-02-11 | End: 2020-02-11 | Stop reason: HOSPADM

## 2020-02-11 RX ORDER — SODIUM CHLORIDE 0.9 % (FLUSH) 0.9 %
10 SYRINGE (ML) INJECTION EVERY 12 HOURS SCHEDULED
Status: DISCONTINUED | OUTPATIENT
Start: 2020-02-11 | End: 2020-02-11 | Stop reason: HOSPADM

## 2020-02-11 RX ORDER — HYDROCODONE BITARTRATE AND ACETAMINOPHEN 5; 325 MG/1; MG/1
1 TABLET ORAL EVERY 6 HOURS PRN
Qty: 28 TABLET | Refills: 0 | Status: SHIPPED | OUTPATIENT
Start: 2020-02-11 | End: 2020-02-18

## 2020-02-11 RX ORDER — SODIUM CHLORIDE 0.9 % (FLUSH) 0.9 %
10 SYRINGE (ML) INJECTION EVERY 12 HOURS SCHEDULED
Status: CANCELLED | OUTPATIENT
Start: 2020-02-11

## 2020-02-11 RX ORDER — PROMETHAZINE HYDROCHLORIDE 25 MG/ML
6.25 INJECTION, SOLUTION INTRAMUSCULAR; INTRAVENOUS
Status: DISCONTINUED | OUTPATIENT
Start: 2020-02-11 | End: 2020-02-11 | Stop reason: HOSPADM

## 2020-02-11 RX ORDER — PROPOFOL 10 MG/ML
INJECTION, EMULSION INTRAVENOUS PRN
Status: DISCONTINUED | OUTPATIENT
Start: 2020-02-11 | End: 2020-02-11 | Stop reason: SDUPTHER

## 2020-02-11 RX ADMIN — SODIUM CHLORIDE: 9 INJECTION, SOLUTION INTRAVENOUS at 01:11

## 2020-02-11 RX ADMIN — DIAZEPAM 10 MG: 5 TABLET ORAL at 13:54

## 2020-02-11 RX ADMIN — PROPOFOL 30 MG: 10 INJECTION, EMULSION INTRAVENOUS at 09:56

## 2020-02-11 RX ADMIN — GABAPENTIN 900 MG: 300 CAPSULE ORAL at 13:54

## 2020-02-11 RX ADMIN — QUETIAPINE FUMARATE 300 MG: 100 TABLET ORAL at 01:08

## 2020-02-11 RX ADMIN — MIDAZOLAM HYDROCHLORIDE 2 MG: 1 INJECTION, SOLUTION INTRAMUSCULAR; INTRAVENOUS at 09:17

## 2020-02-11 RX ADMIN — Medication 50 MG: at 09:30

## 2020-02-11 RX ADMIN — PROPOFOL 50 MG: 10 INJECTION, EMULSION INTRAVENOUS at 09:49

## 2020-02-11 RX ADMIN — FENTANYL CITRATE 50 MCG: 50 INJECTION, SOLUTION INTRAMUSCULAR; INTRAVENOUS at 10:35

## 2020-02-11 RX ADMIN — CYCLOBENZAPRINE 10 MG: 10 TABLET, FILM COATED ORAL at 01:07

## 2020-02-11 RX ADMIN — HYDROCODONE BITARTRATE AND ACETAMINOPHEN 2 TABLET: 5; 325 TABLET ORAL at 12:07

## 2020-02-11 RX ADMIN — HYDROMORPHONE HYDROCHLORIDE 0.5 MG: 2 INJECTION INTRAMUSCULAR; INTRAVENOUS; SUBCUTANEOUS at 10:14

## 2020-02-11 RX ADMIN — HYDROCODONE BITARTRATE AND ACETAMINOPHEN 2 TABLET: 5; 325 TABLET ORAL at 05:10

## 2020-02-11 RX ADMIN — PROPOFOL 50 MG: 10 INJECTION, EMULSION INTRAVENOUS at 09:27

## 2020-02-11 RX ADMIN — HYDROMORPHONE HYDROCHLORIDE 0.5 MG: 2 INJECTION INTRAMUSCULAR; INTRAVENOUS; SUBCUTANEOUS at 09:53

## 2020-02-11 RX ADMIN — PROPOFOL 50 MG: 10 INJECTION, EMULSION INTRAVENOUS at 09:37

## 2020-02-11 RX ADMIN — HYDROMORPHONE HYDROCHLORIDE 1 MG: 2 INJECTION INTRAMUSCULAR; INTRAVENOUS; SUBCUTANEOUS at 09:24

## 2020-02-11 RX ADMIN — DIAZEPAM 10 MG: 5 TABLET ORAL at 04:26

## 2020-02-11 RX ADMIN — PROPOFOL 50 MG: 10 INJECTION, EMULSION INTRAVENOUS at 09:44

## 2020-02-11 RX ADMIN — HYDROCODONE BITARTRATE AND ACETAMINOPHEN 2 TABLET: 5; 325 TABLET ORAL at 16:51

## 2020-02-11 RX ADMIN — Medication 2 G: at 09:35

## 2020-02-11 RX ADMIN — DOCUSATE SODIUM 100 MG: 100 CAPSULE, LIQUID FILLED ORAL at 13:54

## 2020-02-11 RX ADMIN — FENTANYL CITRATE 50 MCG: 50 INJECTION, SOLUTION INTRAMUSCULAR; INTRAVENOUS at 10:30

## 2020-02-11 RX ADMIN — HYDROCODONE BITARTRATE AND ACETAMINOPHEN 2 TABLET: 5; 325 TABLET ORAL at 01:07

## 2020-02-11 RX ADMIN — PROPOFOL 50 MG: 10 INJECTION, EMULSION INTRAVENOUS at 09:52

## 2020-02-11 RX ADMIN — PROPOFOL 30 MG: 10 INJECTION, EMULSION INTRAVENOUS at 10:08

## 2020-02-11 RX ADMIN — PROPOFOL 30 MG: 10 INJECTION, EMULSION INTRAVENOUS at 10:01

## 2020-02-11 ASSESSMENT — PULMONARY FUNCTION TESTS
PIF_VALUE: 0
PIF_VALUE: 1
PIF_VALUE: 0
PIF_VALUE: 2
PIF_VALUE: 0

## 2020-02-11 ASSESSMENT — PAIN DESCRIPTION - PAIN TYPE
TYPE: ACUTE PAIN
TYPE: ACUTE PAIN;CHRONIC PAIN
TYPE: ACUTE PAIN
TYPE: ACUTE PAIN

## 2020-02-11 ASSESSMENT — PAIN DESCRIPTION - DESCRIPTORS
DESCRIPTORS: SHARP;JABBING
DESCRIPTORS: BURNING
DESCRIPTORS: ACHING
DESCRIPTORS: BURNING

## 2020-02-11 ASSESSMENT — PAIN SCALES - GENERAL
PAINLEVEL_OUTOF10: 7
PAINLEVEL_OUTOF10: 5
PAINLEVEL_OUTOF10: 9
PAINLEVEL_OUTOF10: 9
PAINLEVEL_OUTOF10: 7
PAINLEVEL_OUTOF10: 5
PAINLEVEL_OUTOF10: 7
PAINLEVEL_OUTOF10: 7
PAINLEVEL_OUTOF10: 9
PAINLEVEL_OUTOF10: 7
PAINLEVEL_OUTOF10: 9

## 2020-02-11 ASSESSMENT — PAIN DESCRIPTION - FREQUENCY
FREQUENCY: CONTINUOUS

## 2020-02-11 ASSESSMENT — PAIN DESCRIPTION - PROGRESSION
CLINICAL_PROGRESSION: NOT CHANGED
CLINICAL_PROGRESSION: NOT CHANGED

## 2020-02-11 ASSESSMENT — PAIN DESCRIPTION - ONSET
ONSET: ON-GOING
ONSET: PROGRESSIVE
ONSET: ON-GOING
ONSET: ON-GOING

## 2020-02-11 ASSESSMENT — PAIN - FUNCTIONAL ASSESSMENT
PAIN_FUNCTIONAL_ASSESSMENT: ACTIVITIES ARE NOT PREVENTED
PAIN_FUNCTIONAL_ASSESSMENT: PREVENTS OR INTERFERES SOME ACTIVE ACTIVITIES AND ADLS

## 2020-02-11 ASSESSMENT — PAIN DESCRIPTION - LOCATION
LOCATION: WRIST;BACK
LOCATION: BACK
LOCATION: WRIST

## 2020-02-11 ASSESSMENT — PAIN DESCRIPTION - ORIENTATION
ORIENTATION: RIGHT
ORIENTATION: RIGHT
ORIENTATION: LOWER
ORIENTATION: RIGHT

## 2020-02-11 NOTE — PROGRESS NOTES
Pt returned to [phone] from PACU. Alert and oriented x3. IV of 0.9 NS infusing without difficulty, infusing into the forearm left, condition patent and no redness at a rate of 125 mls/ hour with about 400 mls in the bag still. IV site free of s/s of infection or infiltration. Vital signs obtained. Assessment and data collection initiated. Bandaid on right wrist area is dry and intact. Hernandez present. Draining clear yellow urine. Pt asking for it to be removed. The patient is interested in [facility]. Samiras meds to beds program?:  No    Policies and procedures for [facility]K explained. All questions answered with no further questions at this time. Fall prevention and safety brochure discussed with patient. Bed alarm on. Call light in reach. The best day to schedule a follow up Dr appointment is:  Tuesday p.m.

## 2020-02-11 NOTE — CONSULTS
CT/CV Surgery Consult Note    2020 8:14 AM  Surgeon:  Dr. Guille Perez     Reason for Consult: Right wrist foreign body    CC:   Right wrist foreign body    HPI:    Ms. Ari Elaine  is a 64year old female who was suppose to [facility] a procedure yesterday with Dr. Liz Gómez but was canceled due to infiltration of right wrist with hematoma with attempt at arterial line placement. Pt has bruising at site. XRAY obtained showed small wire like foreign body in distal forearm. Tried to vsiualize with bedside US but hard to have a definitive answer of exact location. Vital Signs: /65   Pulse 93   Temp 98.7 °F (37.1 °C) (Oral)   Resp 18   Ht 5' 8\" (1.727 m)   Wt 288 lb 12.8 oz (131 kg)   SpO2 92%   BMI 43.91 kg/m²    Temp (24hrs), Av.1 °F (36.7 °C), Min:97.6 °F (36.4 °C), Max:98.7 °F (37.1 °C)      PULSE OXIMETRY RANGE: SpO2  Av.5 %  Min: 88 %  Max: 100 %    SUPPLEMENTAL O2: O2 Flow Rate (L/min): 2 L/min     Labs:   CBC:   No results for input(s): WBC, HGB, HCT, MCV, PLT, APTT, PROTIME, INR in the last 72 hours. BMP: No results for input(s): NA, K, CL, CO2, PHOS, BUN, CREATININE, MG, POCGLU in the last 72 hours. Invalid input(s): CA  Last HgA1C: No results found for: LABA1C    Imaging:  I have reviewed imaging  Narrative   XR WRIST RIGHT (2 VIEWS)       CLINICAL INFORMATION: Trauma. Rule out foreign body.       COMPARISON: No prior study.       TECHNIQUE: Standard views of the wrist were obtained       FINDINGS: Moderate soft tissue swelling surrounds the distal forearm. A small metallic wire foreign body is seen in the volar soft tissues of the distal forearm at the level of the distal radial diametaphysis. The most superficial aspect of this foreign    body is 3 mm deep to the skin surface. No fracture or dislocation is seen.           Impression   Soft tissue swelling. No fracture.  Small wire-like foreign body in the volar soft tissues of the distal forearm.               **This report has been created using voice recognition software.  It may contain minor errors which are inherent in voice recognition technology. **       Final report electronically signed by Dr. Mariya Silvestre on 2/10/2020 1:06 PM         Intake/Output Summary (Last 24 hours) at 2/11/2020 0814  Last data filed at 2/11/2020 0330  Gross per 24 hour   Intake 3796.27 ml   Output 1800 ml   Net 1996.27 ml       Scheduled Meds:    sodium chloride flush  10 mL Intravenous 2 times per day    ceFAZolin (ANCEF) IVPB  3 g Intravenous On Call to OR    HYDROmorphone  0.25 mg Intravenous Once    sodium chloride flush  10 mL Intravenous 2 times per day    docusate sodium  100 mg Oral BID    nortriptyline  50 mg Oral Nightly    QUEtiapine  300 mg Oral Nightly    gabapentin  900 mg Oral TID    nicotine  1 patch Transdermal Daily         PastMedical History:  Lorenzo Gee  has a past medical history of Alcohol abuse, Anxiety, Chronic low back pain, Depression, Hypertension, MDD (major depressive disorder), recurrent severe, without psychosis ([address] Morbid obesity with BMI of 40.0-44.9, adult ([facility] Schizoaffective disorder ([facility] [address] and Suicide attempt by polypharm overdose ([facility] [address] Past Surgical History:  The patient  has a past surgical history that includes Tubal ligation; Tonsillectomy; Ankle surgery; cyst removal; Pilonidal cyst excision; and back surgery (nov 2013). Allergies: The patient is allergic to codeine and morphine. Family History: This patient's family history includes Diabetes in her mother; Heart Disease in her father; Mental Illness in her father. Social History:  Lorenzo Gee  reports that she has been smoking cigarettes. She has a 0.25 pack-year smoking history. She has never used smokeless tobacco. She reports previous alcohol use of about 6.0 standard drinks of alcohol per week. She reports previous drug use. ROS:  Constitutional: Negative for activity change, chills, fatigue, fever and unexpected weight change.    HENT: Negative for congestion, facial swelling, sore throat, and changes in voice. Eyes: Negative for photophobia, redness, itching and visual disturbance. Respiratory: Negative for apnea, choking, shortness of breath, wheezing and stridor. Cardiovascular: Negative for chest pain, palpitations and leg swelling. Gastrointestinal: Negative for abdominal distention, constipation, nausea and vomiting. Endocrine: Negative for cold intolerance, heat intolerance, polyphagia and polyuria. Musculoskeletal: Negative for arthralgias, gait problem, and myalgias. Skin: Negative for color change, rash and wound. Allergic/Immunologic: Negative for food allergies and immunocompromised state. Neurological: Negative for dizziness, tremors, speech difficulty, weakness, numbness and headaches. Hematological: Negative for adenopathy. Does not bruise/bleed easily. Psychiatric/Behavioral: Negative for agitation, confusion, and dysphoric mood. Physical Exam:   General appearance:  No apparent distress, appears stated age and cooperative. HEENT:  Normal cephalic, atraumatic without obvious deformity. Conjunctivae/corneas clear. Neck: Supple, with full range of motion. No jugular venous distention. Trachea midline. Respiratory:  Normal respiratory effort. Clear to auscultation, bilaterally without rales/wheezes/rhonchi. Cardiovascular:  Regular rate and rhythm with normal S1/S2 without murmurs, rubs or gallops. Abdomen: Soft, non-tender, non-distended with normal bowel sounds. Musculoskeletal:  No clubbing, cyanosis or edema bilaterally. Full range of motion without deformity. Skin: Bruising at right wrist  Neurologic:  Neurovascularly intact without any focal sensory/motor deficits. Psychiatric:  Alert and oriented, thought content appropriate, normal insight.        Active Problem List  Patient Active Problem List   Diagnosis    Suicide attempt by polypharm overdose ([facility] [address]    Depression    Chronic pain syndrome   

## 2020-02-11 NOTE — PROGRESS NOTES
Discharge instructions given, no voiced concerns, transported via wheelchair to private car with belongings

## 2020-02-11 NOTE — ANESTHESIA PRE PROCEDURE
sodium chloride flush 0.9 % injection 10 mL  10 mL Intravenous PRN Bernadette Rivas PA-C        ceFAZolin (ANCEF) 3 g in dextrose 5 % 100 mL IVPB  3 g Intravenous [facility]        midazolam (VERSED) injection    PRN Edvin Section, APRN - CRNA   2 mg at 02/11/20 0917    HYDROmorphone (DILAUDID) injection 0.25 mg  0.25 mg Intravenous Once Alexus Unger PA-C        hydrOXYzine (ATARAX) tablet 50 mg  50 mg Oral Q4H PRN Alexus Unger PA-C   50 mg at 02/10/20 1446    0.9 % sodium chloride infusion   Intravenous Continuous Alexus Unger PA-C 125 mL/hr at 02/11/20 0111      sodium chloride flush 0.9 % injection 10 mL  10 mL Intravenous 2 times per day Alexus Unger PA-C        sodium chloride flush 0.9 % injection 10 mL  10 mL Intravenous PRN Alexus Unger PA-C        docusate sodium (COLACE) capsule 100 mg  100 mg Oral BID Alexus Unger PA-C   100 mg at 02/10/20 2052    acetaminophen (TYLENOL) tablet 650 mg  650 mg Oral Q4H PRN Alexus Unger PA-C        cyclobenzaprine (FLEXERIL) tablet 10 mg  10 mg Oral TID PRN Alexus Unger PA-C   10 mg at 02/11/20 0107    diazepam (VALIUM) tablet 10 mg  10 mg Oral Q6H PRN TISH Berrios   10 mg at 02/11/20 0426    albuterol sulfate  (90 Base) MCG/ACT inhaler 1 puff  1 puff Inhalation Q6H PRN Alexus Unger PA-C        nortriptyline (PAMELOR) capsule 50 mg  50 mg Oral Nightly Alexus Unger PA-C   50 mg at 02/10/20 0016    QUEtiapine (SEROQUEL) tablet 300 mg  300 mg Oral Nightly Alexus Unger PA-C   300 mg at 02/11/20 0108    magnesium hydroxide (MILK OF MAGNESIA) 400 MG/5ML suspension 30 mL  30 mL Oral Daily PRN Alexus Unger PA-C        ondansetron [facility] injection 4 mg  4 mg Intravenous Q6H PRN Alexus Unger PA-C        HYDROcodone-acetaminophen (NORCO) 5-325 MG per tablet 1 tablet  1 tablet Oral Q4H PRN Alexus Unger PA-C        Or    HYDROcodone-acetaminophen (NORCO) 5-325 MG per tablet 2 tablet  2 tablet Oral Q4H PRN Alexus Unger PA-C   2 tablet at 02/11/20 0510    gabapentin (NEURONTIN) capsule 900 mg  900 mg Oral TID Luda Taylor PA-C   900 mg at 02/10/20 2053    nicotine (NICODERM CQ) 21 MG/24HR 1 patch  1 patch Transdermal Daily Luda Taylor PA-C   1 patch at 02/10/20 1444       Allergies:     Allergies   Allergen Reactions    Codeine Nausea And Vomiting    Morphine Nausea And Vomiting       Problem List:    Patient Active Problem List   Diagnosis Code    [facility] T65.92XA    Depression F32.9    Chronic pain syndrome G89.4    Schizoaffective disorder, depressive type ([facility] F25.1    Acute cystitis without hematuria N30.00    Continuous leakage of urine N39.45    Alcohol abuse F10.10    Abnormal EKG R94.31    Affective psychosis, bipolar ([facility] [facility] F31.9    MDD (major depressive disorder), recurrent severe, without psychosis ([facility] [address] F33.2    Spinal stenosis, lumbar region without neurogenic claudication M48.061       Past Medical History:        Diagnosis Date    Alcohol abuse 6/13/2016    Anxiety     Chronic low back pain     Depression     Hypertension     MDD (major depressive disorder), recurrent severe, without psychosis ([facility] [address] 1/1/2020    Morbid obesity with BMI of 40.0-44.9, adult ([facility] [facility]     Schizoaffective disorder ([facility] [facility]     Su[facility]pharm [facility] 5/24/2016       Past Surgical History:        Procedure Laterality Date    ANKLE SURGERY      BACK SURGERY  nov 2013    at [address]      off end of tailbone    PILONIDAL CYST EXCISION      TONSILLECTOMY      TUBAL LIGATION         Social History:    Social History     Tobacco Use    Smoking status: Current Every Day Smoker     Packs/day: 0.25     Years: 1.00     Pack years: 0.25     Types: Cigarettes    Smokeless tobacco: Never Used   Substance Use Topics    Alcohol use: Not Currently     Alcohol/week: 6.0 standard drinks     Types: 6 Cans of beer per week     Comment: last used alcohol 6/12/2016

## 2020-02-11 NOTE — CARE COORDINATION
2/11/20, 2:41 PM    DISCHARGE ONGOING EVALUATION:     Kassidy 39 day: 2  Location: -01/001-A Reason for admit: Spinal stenosis, lumbar region without neurogenic claudication [M48.061]   Treatment Plan of Care: Ortho ok for discharge. Surgery to be postponed for 2 weeks r/t arterial line breaking off while attempting to place in OR. Was taken to OR today for removal. IVF. Pain control. Barriers to Discharge: Anticipate discharge later today if ok with vascular. Plans home with sister since lumbar surgery not completed.    PCP: EZE San CNP  Readmission Risk Score: 17%

## 2020-02-11 NOTE — DISCHARGE INSTR - DIET

## 2020-02-11 NOTE — FLOWSHEET NOTE
Pt is a 65yo woman.  responded to a consult for prayer with pt being anxious. Pt's sister was present. Pt has just spent the last month in addiction rehab. Pt has had back issues for some time, and pt was very agitated at the beginning of the visit, stating that they had not honored her doctor's note that stated she should have been allowed time to lay down during the day. Pt believes that is why she fell over the weekend.  encouraged pt to focus on the fact that shew as clean now and to keep building on that. Pt shared that she had been given information about AD's and was ready to complete them.  helped pt complete the MPOA, but pt was hesitant about the Living Will and finally decided she was in too much pain to complete it today. Pt was supposed to have had back surgery today, but something happened and the IV needle broke off in her arm and the surgery was stopped and this will have to be addressed before her back surgery can be rescheduled.  prayed with pt and her sister. Pt became very tearful during the prayer and continued praying out loud after the  was done praying. Spiritual care to continue to follow up on AD and to offer support. 02/10/20 2100   Encounter Summary   Services provided to: Patient and family together   Referral/Consult From: Physician   Support System Children;Family members   Continue Visiting Yes  (2/10)   Complexity of Encounter High   Length of Encounter 2 hours   Spiritual/Yazidi   Type Spiritual support   Assessment Approachable; Anxious; Tearful   Intervention Active listening;Explored feelings, thoughts, concerns;Explored coping resources;Prayer;Sustaining presence/ Ministry of presence   Outcome Connection/belonging;Comfort;Engaged in conversation;Expressed feelings/needs/concerns; Tearful

## 2020-02-13 NOTE — DISCHARGE SUMMARY
[address] [place] [address]                               DISCHARGE SUMMARY    PATIENT NAME: Amado Adkins                        :        1960  MED REC NO:   [id number]                           ROOM:       0001  ACCOUNT NO:   [de-identified]                           ADMIT DATE: 2020  PROVIDER:     Victoriano Segura PA-C                    [facility] DATE: 2020    DISCHARGE DIAGNOSES:  1. Lumbar spinal stenosis. 2.  Foreign body, right wrist, near the radial artery. OPERATION PERFORMED:  Her initially scheduled operation including a  removal of hardware from L4 to S1 and L2 to L4 laminectomy with L2 to S1  fusion was canceled. Outpatient, she ended up receiving _____ was done  by Dr. Ann Marie Weinstein, which included an exploration of the right wrist under  fluoroscopy and removal of broken wire. HOSPITAL COURSE:  The patient is a 82-year-old who presented to the  hospital on the night of 2020 in preparation for surgery the  following morning. She was admitted to the hospital and prepared for  surgery which would take place the following day, on 02/10/2020, she was  brought to the operating room and with Dr. Audra oCrea of  Anesthesiology, an arterial line was attempted to be placed; however,  during the placement of the arterial line, apparently the tip of this  had broken off and a pause was placed on the procedure. Both Dr. Richard West  and Dr. Ana Kathleen evaluated the patient and recommended further followup  with Vascular Surgery. The vascular surgeons were consulted and on the  following day, date of 2020, the patient underwent an exploration  of the right wrist and removal of the broken arterial wire piece. She  was then later discharged that day. The plan moving forward with the  patient was discussed including a new surgical date for her low back,  which will be 2020.   We will give her several weeks here to heal  and recover in terms of her right wrist.  The patient expressed her  understanding with the plan moving forward. We will be seeing her back  in the office on 02/25/2020 to re-evaluate her in preparation for  surgery. She was discharged home with [place] for continued pain relief. We will see her back in approximately two weeks in the office for  re-evaluation.         Josette Whitten [place]    D: 02/12/2020 6:54:50       T: 02/12/2020 7:38:13     [email or web address]  Job#: [id number]     Doc#: [id number]    CC:  Governor Denisse, CNP

## 2020-03-11 ENCOUNTER — HOSPITAL ENCOUNTER (INPATIENT)
Age: 60
LOS: 8 days | Discharge: HOME | DRG: 750 | End: 2020-03-19
Attending: EMERGENCY MEDICINE | Admitting: PSYCHIATRY & NEUROLOGY
Payer: MEDICARE

## 2020-03-11 PROBLEM — F25.9 SCHIZOAFFECTIVE DISORDER (HCC): Status: ACTIVE | Noted: 2020-03-11

## 2020-03-11 LAB
ACETAMINOPHEN LEVEL: 14.2 UG/ML (ref 0–20)
ALBUMIN SERPL-MCNC: 4 G/DL (ref 3.5–5.1)
ALP BLD-CCNC: 79 U/L (ref 38–126)
ALT SERPL-CCNC: 32 U/L (ref 11–66)
AMPHETAMINE+METHAMPHETAMINE URINE SCREEN: NEGATIVE
ANION GAP SERPL CALCULATED.3IONS-SCNC: 15 MEQ/L (ref 8–16)
AST SERPL-CCNC: 23 U/L (ref 5–40)
BACTERIA: ABNORMAL /HPF
BARBITURATE QUANTITATIVE URINE: NEGATIVE
BASOPHILS # BLD: 0.8 %
BASOPHILS ABSOLUTE: 0.1 THOU/MM3 (ref 0–0.1)
BENZODIAZEPINE QUANTITATIVE URINE: NEGATIVE
BILIRUB SERPL-MCNC: 0.3 MG/DL (ref 0.3–1.2)
BILIRUBIN URINE: NEGATIVE
BLOOD, URINE: NEGATIVE
BUN BLDV-MCNC: 12 MG/DL (ref 7–22)
CALCIUM SERPL-MCNC: 9.3 MG/DL (ref 8.5–10.5)
CANNABINOID QUANTITATIVE URINE: NEGATIVE
CASTS 2: ABNORMAL /LPF
CASTS UA: ABNORMAL /LPF
CHARACTER, URINE: ABNORMAL
CHLORIDE BLD-SCNC: 103 MEQ/L (ref 98–111)
CO2: 20 MEQ/L (ref 23–33)
COCAINE METABOLITE QUANTITATIVE URINE: NEGATIVE
COLOR: YELLOW
CREAT SERPL-MCNC: 1 MG/DL (ref 0.4–1.2)
CRYSTALS, UA: ABNORMAL
EOSINOPHIL # BLD: 0 %
EOSINOPHILS ABSOLUTE: 0 THOU/MM3 (ref 0–0.4)
EPITHELIAL CELLS, UA: ABNORMAL /HPF
ERYTHROCYTE [DISTWIDTH] IN BLOOD BY AUTOMATED COUNT: 12.4 % (ref 11.5–14.5)
ERYTHROCYTE [DISTWIDTH] IN BLOOD BY AUTOMATED COUNT: 40.9 FL (ref 35–45)
ETHYL ALCOHOL, SERUM: < 0.01 %
GFR SERPL CREATININE-BSD FRML MDRD: 57 ML/MIN/1.73M2
GLUCOSE BLD-MCNC: 131 MG/DL (ref 70–108)
GLUCOSE URINE: NEGATIVE MG/DL
HCT VFR BLD CALC: 38.6 % (ref 37–47)
HEMOGLOBIN: 12.8 GM/DL (ref 12–16)
IMMATURE GRANS (ABS): 0.01 THOU/MM3 (ref 0–0.07)
IMMATURE GRANULOCYTES: 0.1 %
KETONES, URINE: NEGATIVE
LEUKOCYTE ESTERASE, URINE: ABNORMAL
LYMPHOCYTES # BLD: 25.6 %
LYMPHOCYTES ABSOLUTE: 2.2 THOU/MM3 (ref 1–4.8)
MAGNESIUM: 2 MG/DL (ref 1.6–2.4)
MCH RBC QN AUTO: 29.6 PG (ref 26–33)
MCHC RBC AUTO-ENTMCNC: 33.2 GM/DL (ref 32.2–35.5)
MCV RBC AUTO: 89.1 FL (ref 81–99)
MISCELLANEOUS 2: ABNORMAL
MONOCYTES # BLD: 7.2 %
MONOCYTES ABSOLUTE: 0.6 THOU/MM3 (ref 0.4–1.3)
NITRITE, URINE: NEGATIVE
NUCLEATED RED BLOOD CELLS: 0 /100 WBC
OPIATES, URINE: NEGATIVE
OSMOLALITY CALCULATION: 277.2 MOSMOL/KG (ref 275–300)
OXYCODONE: NEGATIVE
PH UA: 5.5 (ref 5–9)
PHENCYCLIDINE QUANTITATIVE URINE: NEGATIVE
PLATELET # BLD: 289 THOU/MM3 (ref 130–400)
PMV BLD AUTO: 10.1 FL (ref 9.4–12.4)
POTASSIUM REFLEX MAGNESIUM: 3.5 MEQ/L (ref 3.5–5.2)
PROTEIN UA: NEGATIVE
RBC # BLD: 4.33 MILL/MM3 (ref 4.2–5.4)
RBC URINE: ABNORMAL /HPF
RENAL EPITHELIAL, UA: ABNORMAL
SALICYLATE, SERUM: 15.5 MG/DL (ref 2–10)
SEG NEUTROPHILS: 66.3 %
SEGMENTED NEUTROPHILS ABSOLUTE COUNT: 5.6 THOU/MM3 (ref 1.8–7.7)
SODIUM BLD-SCNC: 138 MEQ/L (ref 135–145)
SPECIFIC GRAVITY, URINE: 1.01 (ref 1–1.03)
TOTAL PROTEIN: 7.5 G/DL (ref 6.1–8)
UROBILINOGEN, URINE: 0.2 EU/DL (ref 0–1)
WBC # BLD: 8.5 THOU/MM3 (ref 4.8–10.8)
WBC UA: ABNORMAL /HPF
YEAST: ABNORMAL

## 2020-03-11 PROCEDURE — 80053 COMPREHEN METABOLIC PANEL: CPT

## 2020-03-11 PROCEDURE — 36415 COLL VENOUS BLD VENIPUNCTURE: CPT

## 2020-03-11 PROCEDURE — G0480 DRUG TEST DEF 1-7 CLASSES: HCPCS

## 2020-03-11 PROCEDURE — 6370000000 HC RX 637 (ALT 250 FOR IP)

## 2020-03-11 PROCEDURE — 85025 COMPLETE CBC W/AUTO DIFF WBC: CPT

## 2020-03-11 PROCEDURE — 83735 ASSAY OF MAGNESIUM: CPT

## 2020-03-11 PROCEDURE — 6370000000 HC RX 637 (ALT 250 FOR IP): Performed by: PSYCHIATRY & NEUROLOGY

## 2020-03-11 PROCEDURE — 1240000000 HC EMOTIONAL WELLNESS R&B

## 2020-03-11 PROCEDURE — 99282 EMERGENCY DEPT VISIT SF MDM: CPT

## 2020-03-11 PROCEDURE — 87086 URINE CULTURE/COLONY COUNT: CPT

## 2020-03-11 PROCEDURE — 80307 DRUG TEST PRSMV CHEM ANLYZR: CPT

## 2020-03-11 PROCEDURE — 81001 URINALYSIS AUTO W/SCOPE: CPT

## 2020-03-11 RX ORDER — NICOTINE 21 MG/24HR
1 PATCH, TRANSDERMAL 24 HOURS TRANSDERMAL NIGHTLY
Status: DISCONTINUED | OUTPATIENT
Start: 2020-03-11 | End: 2020-03-19 | Stop reason: HOSPADM

## 2020-03-11 RX ORDER — NORTRIPTYLINE HYDROCHLORIDE 25 MG/1
50 CAPSULE ORAL NIGHTLY
Status: DISCONTINUED | OUTPATIENT
Start: 2020-03-11 | End: 2020-03-15

## 2020-03-11 RX ORDER — HYDROXYZINE HYDROCHLORIDE 25 MG/1
50 TABLET, FILM COATED ORAL EVERY 4 HOURS PRN
Status: DISCONTINUED | OUTPATIENT
Start: 2020-03-11 | End: 2020-03-18

## 2020-03-11 RX ORDER — TRAZODONE HYDROCHLORIDE 50 MG/1
50 TABLET ORAL NIGHTLY PRN
Status: DISCONTINUED | OUTPATIENT
Start: 2020-03-11 | End: 2020-03-19 | Stop reason: HOSPADM

## 2020-03-11 RX ORDER — QUETIAPINE FUMARATE 100 MG/1
300 TABLET, FILM COATED ORAL NIGHTLY
Status: DISCONTINUED | OUTPATIENT
Start: 2020-03-11 | End: 2020-03-19 | Stop reason: HOSPADM

## 2020-03-11 RX ORDER — ACETAMINOPHEN 325 MG/1
650 TABLET ORAL EVERY 4 HOURS PRN
Status: DISCONTINUED | OUTPATIENT
Start: 2020-03-11 | End: 2020-03-19 | Stop reason: HOSPADM

## 2020-03-11 RX ORDER — MAGNESIUM HYDROXIDE/ALUMINUM HYDROXICE/SIMETHICONE 120; 1200; 1200 MG/30ML; MG/30ML; MG/30ML
30 SUSPENSION ORAL EVERY 6 HOURS PRN
Status: DISCONTINUED | OUTPATIENT
Start: 2020-03-11 | End: 2020-03-19 | Stop reason: HOSPADM

## 2020-03-11 RX ORDER — IBUPROFEN 400 MG/1
400 TABLET ORAL EVERY 6 HOURS PRN
Status: DISCONTINUED | OUTPATIENT
Start: 2020-03-11 | End: 2020-03-19 | Stop reason: HOSPADM

## 2020-03-11 RX ORDER — ALBUTEROL SULFATE 90 UG/1
1 AEROSOL, METERED RESPIRATORY (INHALATION) EVERY 6 HOURS PRN
Status: DISCONTINUED | OUTPATIENT
Start: 2020-03-11 | End: 2020-03-19 | Stop reason: HOSPADM

## 2020-03-11 RX ORDER — GABAPENTIN 600 MG/1
900 TABLET ORAL 3 TIMES DAILY
Status: DISCONTINUED | OUTPATIENT
Start: 2020-03-11 | End: 2020-03-13

## 2020-03-11 RX ORDER — M-VIT,TX,IRON,MINS/CALC/FOLIC 27MG-0.4MG
1 TABLET ORAL DAILY
Status: DISCONTINUED | OUTPATIENT
Start: 2020-03-11 | End: 2020-03-19 | Stop reason: HOSPADM

## 2020-03-11 RX ORDER — ACETAMINOPHEN 325 MG/1
TABLET ORAL
Status: COMPLETED
Start: 2020-03-11 | End: 2020-03-11

## 2020-03-11 RX ADMIN — NORTRIPTYLINE HYDROCHLORIDE 50 MG: 25 CAPSULE ORAL at 21:37

## 2020-03-11 RX ADMIN — GABAPENTIN 900 MG: 600 TABLET, FILM COATED ORAL at 21:37

## 2020-03-11 RX ADMIN — MULTIPLE VITAMINS W/ MINERALS TAB 1 TABLET: TAB at 21:37

## 2020-03-11 RX ADMIN — TRAZODONE HYDROCHLORIDE 50 MG: 50 TABLET ORAL at 21:40

## 2020-03-11 RX ADMIN — QUETIAPINE FUMARATE 300 MG: 100 TABLET ORAL at 21:38

## 2020-03-11 RX ADMIN — ACETAMINOPHEN 650 MG: 325 TABLET ORAL at 16:05

## 2020-03-11 ASSESSMENT — PAIN DESCRIPTION - PAIN TYPE
TYPE: CHRONIC PAIN
TYPE: CHRONIC PAIN

## 2020-03-11 ASSESSMENT — PAIN DESCRIPTION - FREQUENCY: FREQUENCY: CONTINUOUS

## 2020-03-11 ASSESSMENT — PAIN DESCRIPTION - ORIENTATION
ORIENTATION: LOWER;MID
ORIENTATION: LOWER;MID

## 2020-03-11 ASSESSMENT — SLEEP AND FATIGUE QUESTIONNAIRES
AVERAGE NUMBER OF SLEEP HOURS: 5
RESTFUL SLEEP: NO
SLEEP PATTERN: DIFFICULTY FALLING ASLEEP;DISTURBED/INTERRUPTED SLEEP
DIFFICULTY FALLING ASLEEP: YES
DIFFICULTY STAYING ASLEEP: YES
DIFFICULTY ARISING: NO
DO YOU HAVE DIFFICULTY SLEEPING: YES
DO YOU USE A SLEEP AID: NO

## 2020-03-11 ASSESSMENT — PAIN SCALES - GENERAL
PAINLEVEL_OUTOF10: 5
PAINLEVEL_OUTOF10: 7
PAINLEVEL_OUTOF10: 5

## 2020-03-11 ASSESSMENT — PAIN DESCRIPTION - PROGRESSION: CLINICAL_PROGRESSION: NOT CHANGED

## 2020-03-11 ASSESSMENT — LIFESTYLE VARIABLES: HISTORY_ALCOHOL_USE: NO

## 2020-03-11 ASSESSMENT — PAIN DESCRIPTION - ONSET: ONSET: ON-GOING

## 2020-03-11 ASSESSMENT — PAIN DESCRIPTION - DESCRIPTORS: DESCRIPTORS: ACHING;CONSTANT;DISCOMFORT

## 2020-03-11 ASSESSMENT — PAIN - FUNCTIONAL ASSESSMENT: PAIN_FUNCTIONAL_ASSESSMENT: PREVENTS OR INTERFERES SOME ACTIVE ACTIVITIES AND ADLS

## 2020-03-11 ASSESSMENT — PAIN DESCRIPTION - LOCATION
LOCATION: BACK
LOCATION: BACK

## 2020-03-11 NOTE — ED NOTES
4E notified of pt wearing a Quaker pin on her bra. Pt bra does not have under wire and 4E is aware.       Eulalia Rehman  03/11/20 2361

## 2020-03-11 NOTE — ED PROVIDER NOTES
Schizoaffective disorder ([facility] [address] and Suicide attempt by polypharm overdose ([facility] [address] SURGICAL HISTORY      has a past surgical history that includes Tubal ligation; Tonsillectomy; Ankle surgery; cyst removal; Pilonidal cyst excision; back surgery (nov 2013); and Dialysis fistula creation (Right, 2/11/2020). CURRENT MEDICATIONS       Previous Medications    ALBUTEROL SULFATE  (90 BASE) MCG/ACT INHALER    Inhale 1 puff into the lungs every 6 hours as needed for Wheezing 12/03/19 medication list Pt has inhaler with her (locked in medication cabinet envelope # [id number])    GABAPENTIN (NEURONTIN) 600 MG TABLET    Take 900 mg by mouth 3 times daily. HYDROXYZINE (ATARAX) 50 MG TABLET    Take 50 mg by mouth every 4 hours as needed for Itching    MULTIPLE VITAMINS-MINERALS (THERAPEUTIC MULTIVITAMIN-MINERALS) TABLET    Take 1 tablet by mouth daily adrenal support    NORTRIPTYLINE (PAMELOR) 50 MG CAPSULE    Take 50 mg by mouth nightly    QUETIAPINE (SEROQUEL) 300 MG TABLET    Take 1 tablet by mouth nightly       ALLERGIES     is allergic to codeine and morphine. FAMILY HISTORY     She indicated that the status of her mother is unknown. She indicated that the status of her father is unknown.   family history includes Diabetes in her mother; Heart Disease in her father; Mental Illness in her father. SOCIAL HISTORY      reports that she has been smoking cigarettes. She has a 0.25 pack-year smoking history. She has never used smokeless tobacco. She reports previous alcohol use of about 6.0 standard drinks of alcohol per week. She reports previous drug use. PHYSICAL EXAM     INITIAL VITALS:  oral temperature is 97.6 °F (36.4 °C). Her blood pressure is 145/83 (abnormal) and her pulse is 78. Her respiration is 18 and oxygen saturation is 98%. Physical Exam  Vitals signs and nursing note reviewed. Constitutional:       Appearance: She is not diaphoretic. HENT:      Head: Normocephalic and atraumatic. Vitals:    Vitals:    03/11/20 1438   BP: (!) 145/83   Pulse: 78   Resp: 18   Temp: 97.6 °F (36.4 °C)   TempSrc: Oral   SpO2: 98%       2:08 PM EDT: The patient was seen and evaluated. MDM:  Presenting with complaint of paranoia, delusions per patient's sister. States that patient is paranoid, think that her sister is working with the government. Thinks the radio is talking directly to her. Patient has no suicidal attempts, no homicidal intent. Patient's sister, however, states that she does not feel safe with patient home because it appears that she is either hearing voices, and does not seem to trust the sister who has been taking care of her for the past 8 weeks. Patient has slightly elevated salicylate level, that we will trend. Otherwise she does not show any signs of toxicity. Patient will be evaluated by psychiatry. CRITICAL CARE:   None    CONSULTS:      PROCEDURES:  None     FINAL IMPRESSION      1. Delusions ([address]    2. Paranoia ([facility] [address] [facility]          DISPOSITION/PLAN       PATIENT REFERRED TO:  No follow-up provider specified. DISCHARGE MEDICATIONS:  New Prescriptions    No medications on file       (Please note that portions of this note were completed with a voice recognition program.  Efforts were made to edit the dictations but occasionally words aremis-transcribed.)    Scribe:  Eryn Glaser 3/11/20 2:08 PM EDT Scribing for and in the presence of Lior Campbell DO. Signed by: Maia Prince, 03/11/20 4:34 PM    Provider:  I personally performed theservices described in the documentation, reviewed and edited the documentation which was dictated to the scribe in my presence, and it accurately records my words and actions.     Lior Campbell DO 3/11/20 4:34 PM        Lior Campbell DO  03/14/20 [id number]

## 2020-03-11 NOTE — ED NOTES
Pt changed into blue psych scrubs. Resting in bed and denies needs at this time. While pt sister was leaving she told me there is an empty bottle of Hydroxyzine 50 mg in her purse. The prescription was given on 2/26/20. Pt was given 120 capsules to take by mouth 4x daily. Pt sister states she found out pt was using meth on July 13th 2019 and made pt go to rehab. Pt was kicked out of rehab on Jan 1st 2020 for misusing her medications. Pt sisters name is David Pittman and she left her phone number to update her on pt care. Pt sisters number is [phone]. Pt sister took pt purse, keys, wallet, and cell phone with her. Pt clothing items are locked in pt locker outside of safe rooms. Pt did urinate and her urine was labeled and sent to lab.      Nick Taylor  03/11/20 8761

## 2020-03-11 NOTE — ED NOTES
Pt continues to state \"I want to go home\". Pt allowed this RN to obtain a set of vital signs. Pt is complaining that her lower back hurts and states she always has low back pain. Provider notified. Security remains at bedside with pt for pt safety. Pts sister at bedside and ANABELA at bedside with pt. Will continue to monitor.       Willem San RN  03/11/20 3574

## 2020-03-12 LAB
ORGANISM: ABNORMAL
URINE CULTURE REFLEX: ABNORMAL

## 2020-03-12 PROCEDURE — 6360000002 HC RX W HCPCS: Performed by: PSYCHIATRY & NEUROLOGY

## 2020-03-12 PROCEDURE — 6370000000 HC RX 637 (ALT 250 FOR IP): Performed by: PSYCHIATRY & NEUROLOGY

## 2020-03-12 PROCEDURE — 99223 1ST HOSP IP/OBS HIGH 75: CPT | Performed by: PSYCHIATRY & NEUROLOGY

## 2020-03-12 PROCEDURE — 1240000000 HC EMOTIONAL WELLNESS R&B

## 2020-03-12 RX ORDER — LORAZEPAM 2 MG/ML
2 INJECTION INTRAMUSCULAR EVERY 8 HOURS PRN
Status: DISCONTINUED | OUTPATIENT
Start: 2020-03-12 | End: 2020-03-19 | Stop reason: HOSPADM

## 2020-03-12 RX ORDER — HALOPERIDOL 5 MG/ML
5 INJECTION INTRAMUSCULAR EVERY 8 HOURS PRN
Status: DISCONTINUED | OUTPATIENT
Start: 2020-03-12 | End: 2020-03-18

## 2020-03-12 RX ADMIN — GABAPENTIN 900 MG: 600 TABLET, FILM COATED ORAL at 08:07

## 2020-03-12 RX ADMIN — LORAZEPAM 2 MG: 2 INJECTION INTRAMUSCULAR; INTRAVENOUS at 12:05

## 2020-03-12 RX ADMIN — QUETIAPINE FUMARATE 300 MG: 100 TABLET ORAL at 21:32

## 2020-03-12 RX ADMIN — GABAPENTIN 900 MG: 600 TABLET, FILM COATED ORAL at 12:12

## 2020-03-12 RX ADMIN — ACETAMINOPHEN 650 MG: 325 TABLET ORAL at 22:44

## 2020-03-12 RX ADMIN — HALOPERIDOL LACTATE 5 MG: 5 INJECTION INTRAMUSCULAR at 12:05

## 2020-03-12 RX ADMIN — TRAZODONE HYDROCHLORIDE 50 MG: 50 TABLET ORAL at 21:32

## 2020-03-12 RX ADMIN — GABAPENTIN 900 MG: 600 TABLET, FILM COATED ORAL at 21:32

## 2020-03-12 RX ADMIN — IBUPROFEN 400 MG: 200 TABLET, FILM COATED ORAL at 18:25

## 2020-03-12 RX ADMIN — NORTRIPTYLINE HYDROCHLORIDE 50 MG: 25 CAPSULE ORAL at 21:32

## 2020-03-12 RX ADMIN — MULTIPLE VITAMINS W/ MINERALS TAB 1 TABLET: TAB at 08:08

## 2020-03-12 ASSESSMENT — SLEEP AND FATIGUE QUESTIONNAIRES: AVERAGE NUMBER OF SLEEP HOURS: 5

## 2020-03-12 ASSESSMENT — PAIN DESCRIPTION - PAIN TYPE
TYPE: CHRONIC PAIN
TYPE: CHRONIC PAIN

## 2020-03-12 ASSESSMENT — PAIN DESCRIPTION - LOCATION
LOCATION: BACK
LOCATION: BACK

## 2020-03-12 ASSESSMENT — PAIN - FUNCTIONAL ASSESSMENT
PAIN_FUNCTIONAL_ASSESSMENT: ACTIVITIES ARE NOT PREVENTED
PAIN_FUNCTIONAL_ASSESSMENT: ACTIVITIES ARE NOT PREVENTED

## 2020-03-12 ASSESSMENT — PAIN DESCRIPTION - PROGRESSION
CLINICAL_PROGRESSION: NOT CHANGED
CLINICAL_PROGRESSION: NOT CHANGED
CLINICAL_PROGRESSION: GRADUALLY IMPROVING

## 2020-03-12 ASSESSMENT — PAIN SCALES - GENERAL
PAINLEVEL_OUTOF10: 3
PAINLEVEL_OUTOF10: 0
PAINLEVEL_OUTOF10: 10

## 2020-03-12 ASSESSMENT — PAIN DESCRIPTION - ONSET: ONSET: ON-GOING

## 2020-03-12 ASSESSMENT — PAIN DESCRIPTION - DESCRIPTORS
DESCRIPTORS: ACHING
DESCRIPTORS: ACHING

## 2020-03-12 ASSESSMENT — PAIN DESCRIPTION - FREQUENCY
FREQUENCY: CONTINUOUS
FREQUENCY: CONTINUOUS

## 2020-03-12 ASSESSMENT — PAIN DESCRIPTION - ORIENTATION
ORIENTATION: MID;LOWER
ORIENTATION: LOWER

## 2020-03-12 NOTE — PROGRESS NOTES
[id number] [address]  Initial Interdisciplinary Treatment Plan NOTE    Review Date & Time: 03/12/2020 1144    Patient was in treatment team by unable to adequately participate due to delusions and high level of agitation. See summary    See Multidisciplinary Treatment Team sheet for participants. Admission Type:   Admission Type: Involuntary    Reason for admission:  Reason for Admission: Patient is unsure      Estimated Length of Stay Update:  03/14/2020  Estimated Discharge Date Update: 03/17/2020    PATIENT STRENGTHS:  Patient Strengths Strengths: (JADE due to current psychiatric symptoms)  Patient Strengths and Limitations:Limitations: External locus of control  Addictive Behavior:Addictive Behavior  In the past 3 months, have you felt or has someone told you that you have a problem with:  : None  Do you have a history of Chemical Use?: No  Do you have a history of Alcohol Use?: No  Do you have a history of Street Drug Abuse?: No  Histroy of Prescripton Drug Abuse?: No  Medical Problems:  Past Medical History:   Diagnosis Date    Alcohol abuse 6/13/2016    Anxiety     Chronic low back pain     Depression     Hepatitis C     Hypertension     MDD (major depressive disorder), recurrent severe, without psychosis ([facility] [facility] 1/1/2020    Morbid obesity with BMI of 40.0-44.9, adult ([facility]     Schizoaffective disorder ([facility] [facility]     Suicide attempt by polypharm overdose ([facility] [address] 5/24/2016       EDUCATION:   Learner Progress Toward Treatment Goals: JADE due to current psychiatric symptoms    Method: Individual    Outcome: Needs reinforcement, No evidence of Learning and Refused Education    PATIENT GOALS: Stabilize pt psychiatric symptoms    PLAN/TREATMENT RECOMMENDATIONS UPDATE:   1. What is the most important thing we can help you with while you are here? JADE due to current psychiatric symptoms  2. Who is your support system? JADE due to current psychiatric symptoms  3. Do you have follow-up providers?  [facility]

## 2020-03-12 NOTE — PLAN OF CARE
patient to achieve optimal discharge plans, specific to individual needs. Problem: Anxiety:  Goal: Level of anxiety will decrease  Description: Level of anxiety will decrease  Outcome: Ongoing  Note: Pt not complaining of any anxiety      Problem: Altered Mood, Depressive Behavior:  Goal: Participates in care planning  Description: Participates in care planning  Outcome: Ongoing  Note: Patient discussed treatment plan with physician/medical staff, attending group, and compliant with medications. Goal: Patient specific goal  Description: Patient specific goal  Outcome: Ongoing  Note: No goal set for this shift   Goal: Able to verbalize and/or display a decrease in depressive symptoms  Description: Able to verbalize and/or display a decrease in depressive symptoms  Outcome: Ongoing  Note: Pt affect flat and irritable   Goal: Ability to disclose and discuss suicidal ideas will improve  Description: Ability to disclose and discuss suicidal ideas will improve  Outcome: Ongoing  Note: Pt denied any SI at this itme  Goal: Absence of self-harm  Description: Absence of self-harm  Outcome: Ongoing  Note: No self harm behaviors were observed or reported so far this shift. Remains on every 15 minutes precautions for safety. Problem: Pain:  Goal: Pain level will decrease  Description: Pain level will decrease  Outcome: Ongoing  Note: Pt said that she has lower and mid back pain . Pt falls asleep easily and refused to take tylenol.       Problem: Skin Integrity:  Goal: Will show no infection signs and symptoms  Description: Will show no infection signs and symptoms  Outcome: Ongoing  Note: Pt not exhibiting any signs of infection     Problem: Altered Mood, Psychotic Behavior:  Goal: Ability to interact with others will improve  Description: Ability to interact with others will improve  Outcome: Not Met This Shift  Note: Pt isolative to he room at this time     Problem: KNOWLEDGE DEFICIT,EDUCATION,DISCHARGE PLAN  Goal: Knowledge - personal safety  Outcome: Not Met This Shift  Note: Maintained in safe and secure environment.       Problem: Pain:  Goal: Control of acute pain  Description: Control of acute pain  Outcome: Not Met This Shift  Goal: Control of chronic pain  Description: Control of chronic pain  Outcome: Not Met This Shift

## 2020-03-12 NOTE — PLAN OF CARE
Patient has not attended any groups today and has been isolating in her room for the majority of the shift so she has not met his socialization goal.

## 2020-03-12 NOTE — PROGRESS NOTES
[facility] Biopsychosocial Assessment    Current Level of Psychosocial Functioning     Independent   Dependent      XXX  Minimal Assist     Comments:      Psychosocial High Risk Factors (check all that apply)    Unable to assess other possible risk factors. Unable to obtain meds       Chronic illness/pain    Substance abuse   Lack of Family Support   Financial stress   Isolation   Inadequate Community Resources  Suicide attempt(s)  Not taking medications     XXX  Victim of crime   Developmental Delay  Unable to manage personal needs    Age 72 or older   Homeless  No transportation   Readmission within 30 days  Unemployment  Traumatic Event    Psychiatric Advanced Directive:   [address] [place]    Family to involve in treatment:   Sister    Sexual Orientation:      Heterosexual    Patient Strengths:     Unable to assess    Patient Barriers:      Unable to assess    Opiate education provided:   Not indicated/needed    Safety plan:      Contracts for safety    CMHC/MH history:     XXX    Plan of Care:  medication management, group/individual therapies, family meetings, psycho -education, treatment team meetings to assist with stabilization    Initial Discharge Plan:  Was connected with [facility] in the past.     Clinical Summary:  Trell Roberts is a 64year old female, with a history of schizoaffective disorder and substance abuse. She presented to the ED with her sister who shared that pt has been delusional and paranoid for 2 days. Per sister, pt has had past delusions due to heavy substance use. This is her 4th admission to this unit since 2016. Pt has also participated in the IOP program in the past. Pt is unable to to be assesses today. Pt is very agitated with paranoia and delusions. Pt believes that a chip was placed in her wrist when she was having a back surgery.  Her speech is nonsensical. Pt quickly escalates and at one point had taken a ring off of her finger and had thrown

## 2020-03-12 NOTE — H&P
Department of Psychiatry  Psychiatric Assessment      CHIEF COMPLAINT: Severe acute psychosis rendering patient unable to maintain her own basic needs and safety    HISTORY OF PRESENT ILLNESS:      Park Mccollum is a 64 y.o. female with a history of schizoaffective disorder and substance abuse who presented to the emergency department along with her sister, who reports the patient has been psychotic and paranoid for the last 2 days. She has been staying with her sister for 7 weeks after she was kicked out of the facility that she had been living out for \"manipulating her meds \". She has since been off of her psych meds and becoming increasingly psychotic. Since being admitted to , she has been largely uncooperative with assessments, significantly disorganized, insubordinate. Earlier this afternoon, staff reports that she became irritable and angry, tried to leave the unit, then saluted her RN and stated \"I'm in the [employer] now\". She also scratched her right wrist, c/o having a micro chip in her. My assessment was equally unsuccessful, as the majority of her responses were clang associations \"I die and fly into the adrienne I die and fly into the adrienne got a lose weight, go to lose weight, cannot lose weight, cannot lose weight, go to lose weight. It is all because not you not you not you Heather Senior do not you\". PSYCHIATRIC HISTORY:      · Outpatient psychiatric provider: Jayne Rosales MD at   · Suicide attempts: Yes  · Inpatient psychiatric admissions: Multiple, including a few on 40    Past psychiatric medications includes:     Prozac, Trazodone, Effexor, Ambien, Abilify, Depakene, Gabapentin, Hydroxyzine, Klonopin, Invega, Seroquel, Minipress, Thorazine, Cymbalta    Adverse reactions from psychotropic medications:    Denies      Psychiatric Review of Systems      ·    Obsessions and Compulsions: denies  ·    Nitza or Hypomania: denies  ·    Hallucinations: Yes  ·    Panic Attacks:  denies   ·    Delusions:  Yes Take 1 tablet by mouth nightly  albuterol sulfate  (90 Base) MCG/ACT inhaler, Inhale 1 puff into the lungs every 6 hours as needed for Wheezing 12/03/19 medication list Pt has inhaler with her (locked in medication cabinet envelope # [id number])    Allergies:  Codeine and Morphine    Social History:     · RESIDENCE: Has been living with sister for the last few weeks  · LEVEL OF EDUCATION:   HS  · MARITAL STATUS:Single  · CHILDREN: None  · OCCUPATION: Unemployed  SUBSTANCE ABUSE: Questionable    Family Psychiatric and Medical History:         Problem Relation Age of Onset    Diabetes Mother     Heart Disease Father     Mental Illness Father          PHYSICAL EXAM:  Vitals:  /71   Pulse 68   Temp 97 °F (36.1 °C) (Oral)   Resp 16   Ht 5' 8\" (1.727 m)   Wt 290 lb (131.5 kg)   SpO2 96%   BMI 44.09 kg/m²       Physical Exam:    Constitutional: Well developed, well nourished, no acute distress  Eyes: Pupils round and reactive to light bilaterally  Neck:  Supple, no thyromegaly. Cardiovascular:  Normal rate and rhythm, normal S1 and S2. No murmur or gallop on auscultation. Radial pulses 2+ and brisk bilaterally  Lungs: Clear to auscultation bilaterally without wheezing or rales. Musculoskeletal:  Full range of motion in all four extremities. Neurologic:  Cranial nerves II through XII are grossly intact. Normal gait and station.        Mental Status Examination:    Level of consciousness:  within normal limits  Appearance:  Disheveled, poor hygiene, hospital attire lying in bed  Behavior/Motor:  psychomotor agitation  Attitude toward examiner:  guarded and argumentative, eventually screams nonsense at this provider  Speech: Rapid, pressured  Mood: Depressed   affect: Intense  Thought processes: Disorganized, clang associations  Thought content:  Denies homicidal ideation  Suicidal Ideation:  denies suicidal ideation  Delusions:  ?  Paranoid  Perceptual Disturbance:  denies any perceptual Intent to participate and engage in treatment, sufficient fund of knowledge and intellect to understand and utilize treatments. Goals:    Orders received: Restart home medications and titrate for efficacy  Will attempt to call sister for collateral  Encouraged patient to engage in groups, milieu, and individual therapies offered as part of programing. Behavioral Services  Medicare Certification Upon Admission    I certify that this patient's inpatient psychiatric hospital admission is medically necessary for:   X (1) Treatment which could reasonably be expected to improve this patient's condition,      X (2) Or for diagnostic study;     AND     X (2) The inpatient psychiatric services are provided while the individual is under the care of a physician and are included in the individualized plan of care. Estimated length of stay/service 2-10 days    Plan for post-hospital care: Follow up with Vanessa Whyte. Electronically signed by Meaghan Greene PA-C on 3/12/2020 at 2:12 PM                                       Psychiatry Attending Attestation     I assessed this patient and [facility] I have reviewed the above documentation and I agree with the findings and treatment plan with the following updates. Patient is a 65-year-old single  female with history of schizoaffective disorder presented to the emergency department, brought in by her sister, for worsening psychotic symptoms after being off her medications for last several weeks now. Patient has significant thought disorganization this morning. She was very irritable and labile. She was shouting and yelling in the hallway requiring emergency medications this morning. We also had to call security up. She was complaining of back pain. She was making statements that there is a microchip implanted in her and was trying to die again.   Patient's arm was bleeding after she tried it again.  She was refusing to get a Band-Aid on. Was making paranoid statements that we are trying to hurt her. Agree with rest of the assessment and plan as above. Case discussed with staff and treatment team this morning. Electronically signed by Tam Carnes MD on 3/12/20 at 5:55 PM EDT    **This report has been created using voice recognition software. It may contain minor errors which are inherent in voice recognition technology. **

## 2020-03-12 NOTE — FLOWSHEET NOTE
Pt scratched an open area on anterior aspect of right wrist. Scant amount of bleeding noted.  Pt refused to cover area with band aid

## 2020-03-13 PROCEDURE — APPSS30 APP SPLIT SHARED TIME 16-30 MINUTES: Performed by: PHYSICIAN ASSISTANT

## 2020-03-13 PROCEDURE — 99233 SBSQ HOSP IP/OBS HIGH 50: CPT | Performed by: PSYCHIATRY & NEUROLOGY

## 2020-03-13 PROCEDURE — 6370000000 HC RX 637 (ALT 250 FOR IP): Performed by: PHYSICIAN ASSISTANT

## 2020-03-13 PROCEDURE — 1240000000 HC EMOTIONAL WELLNESS R&B

## 2020-03-13 PROCEDURE — 6370000000 HC RX 637 (ALT 250 FOR IP): Performed by: PSYCHIATRY & NEUROLOGY

## 2020-03-13 RX ORDER — GABAPENTIN 600 MG/1
600 TABLET ORAL 2 TIMES DAILY
Status: DISCONTINUED | OUTPATIENT
Start: 2020-03-13 | End: 2020-03-16

## 2020-03-13 RX ORDER — TRAMADOL HYDROCHLORIDE 50 MG/1
50 TABLET ORAL 2 TIMES DAILY PRN
Status: DISCONTINUED | OUTPATIENT
Start: 2020-03-13 | End: 2020-03-19 | Stop reason: HOSPADM

## 2020-03-13 RX ADMIN — NORTRIPTYLINE HYDROCHLORIDE 50 MG: 25 CAPSULE ORAL at 20:55

## 2020-03-13 RX ADMIN — TRAMADOL HYDROCHLORIDE 50 MG: 50 TABLET, FILM COATED ORAL at 15:15

## 2020-03-13 RX ADMIN — IBUPROFEN 400 MG: 200 TABLET, FILM COATED ORAL at 08:01

## 2020-03-13 RX ADMIN — GABAPENTIN 900 MG: 600 TABLET, FILM COATED ORAL at 08:02

## 2020-03-13 RX ADMIN — QUETIAPINE FUMARATE 300 MG: 100 TABLET ORAL at 20:55

## 2020-03-13 RX ADMIN — TRAZODONE HYDROCHLORIDE 50 MG: 50 TABLET ORAL at 20:55

## 2020-03-13 RX ADMIN — GABAPENTIN 600 MG: 600 TABLET, FILM COATED ORAL at 20:54

## 2020-03-13 RX ADMIN — MULTIPLE VITAMINS W/ MINERALS TAB 1 TABLET: TAB at 08:02

## 2020-03-13 RX ADMIN — IBUPROFEN 400 MG: 200 TABLET, FILM COATED ORAL at 19:42

## 2020-03-13 RX ADMIN — ACETAMINOPHEN 650 MG: 325 TABLET ORAL at 20:55

## 2020-03-13 ASSESSMENT — PAIN SCALES - GENERAL
PAINLEVEL_OUTOF10: 0
PAINLEVEL_OUTOF10: 7
PAINLEVEL_OUTOF10: 7
PAINLEVEL_OUTOF10: 5
PAINLEVEL_OUTOF10: 7

## 2020-03-13 ASSESSMENT — PAIN DESCRIPTION - ORIENTATION: ORIENTATION: MID;LOWER

## 2020-03-13 ASSESSMENT — PAIN DESCRIPTION - DESCRIPTORS: DESCRIPTORS: ACHING

## 2020-03-13 ASSESSMENT — PAIN DESCRIPTION - LOCATION: LOCATION: BACK

## 2020-03-13 ASSESSMENT — PAIN - FUNCTIONAL ASSESSMENT: PAIN_FUNCTIONAL_ASSESSMENT: ACTIVITIES ARE NOT PREVENTED

## 2020-03-13 ASSESSMENT — PAIN DESCRIPTION - ONSET: ONSET: ON-GOING

## 2020-03-13 ASSESSMENT — PAIN DESCRIPTION - PROGRESSION: CLINICAL_PROGRESSION: NOT CHANGED

## 2020-03-13 ASSESSMENT — PAIN DESCRIPTION - FREQUENCY: FREQUENCY: CONTINUOUS

## 2020-03-13 NOTE — PLAN OF CARE
Problem: Altered Mood, Deterioration in Function:  Goal: Ability to perform activities of daily living will improve  Description: Ability to perform activities of daily living will improve  3/13/2020 1146 by Lorrie Bailey RN  Outcome: Met This Shift  Note: Pt able to do own AD:'s   3/13/2020 0113 by Hesham Bejarano RN  Outcome: Not Met This Shift  Note: Pt refused to shower this shift. Continues to be disheveled in her own clothes. Goal: Maintenance of adequate nutrition will improve  Description: Maintenance of adequate nutrition will improve  3/13/2020 1146 by Lorrie Bailey RN  Outcome: Met This Shift  Note: Pt eating without issues or concerns   3/13/2020 0113 by Hesham Bejarano RN  Outcome: Met This Shift  Note: Ate 100% snack      Problem: Altered Mood, Deterioration in Function:  Goal: Able to verbalize reality based thinking  Description: Able to verbalize reality based thinking  3/13/2020 1146 by Lorrie Bailey RN  Outcome: Ongoing  Note: Pt orientated to person and place   3/13/2020 0113 by Hesham Bejarano RN  Outcome: Not Met This Shift  Note: Alert and oriented x 1 person only refused assessment states \"just don't want to think\". Goal: Participates in care planning  Description: Participates in care planning  3/13/2020 1146 by Lorrie Bailey RN  Outcome: Ongoing  Note: Patient discussed treatment plan with physician/medical staff, not attending group, and compliant with medications. 3/13/2020 0113 by Hesham Bejarano RN  Outcome: Not Met This Shift  Note: Care plan reviewed with patient and does not verbalize understanding of the plan of care and does not contribute to goal setting.         Problem: Altered Mood, Psychotic Behavior:  Goal: Able to demonstrate trust by eating, participating in treatment and following staff's direction  Description: Able to demonstrate trust by eating, participating in treatment and following staff's direction  3/13/2020 1146 by Lorrie Bailey RN  Outcome: Raine-refused assessment      Problem: Activity:  Goal: Sleeping patterns will improve  Description: Sleeping patterns will improve  3/13/2020 1146 by Yoanna Styles, RN  Outcome: Completed  3/13/2020 0113 by Florencia Christiansen RN  Outcome: Ongoing  Note: Pt has been sleeping on and off this shift. Requested trazodone this shift.

## 2020-03-13 NOTE — PROGRESS NOTES
SW contacted [facility] and spoke with Rodolfo Moon to schedule patient follow up appointment. Rodolfo Moon reports that patient is not longer active with [facility] due to non-compliance will have to walk-in to re-initiate services.      JEREMIAH Wilson

## 2020-03-13 NOTE — PLAN OF CARE
Problem: Altered Mood, Deterioration in Function:  Goal: Maintenance of adequate nutrition will improve  Description: Maintenance of adequate nutrition will improve  Outcome: Met This Shift  Note: Ate 100% snack      Problem: Altered Mood, Psychotic Behavior:  Goal: Compliance with prescribed medication regimen will improve  Description: Compliance with prescribed medication regimen will improve  Outcome: Met This Shift     Problem: Anxiety:  Goal: Level of anxiety will decrease  Description: Level of anxiety will decrease  Outcome: Met This Shift  Note: Refused assessment questions. Did not appear to be anxious this shift. Problem: Altered Mood, Depressive Behavior:  Goal: Absence of self-harm  Description: Absence of self-harm  Outcome: Met This Shift  Note: Pt remains safe and free from harm. 15 minute safety checks are being completed throughout shift. Problem: Skin Integrity:  Goal: Will show no infection signs and symptoms  Description: Will show no infection signs and symptoms  Outcome: Met This Shift  Note: Right wrist open to air no drainage noted. Problem: Altered Mood, Psychotic Behavior:  Goal: Able to demonstrate trust by eating, participating in treatment and following staff's direction  Description: Able to demonstrate trust by eating, participating in treatment and following staff's direction  Outcome: Ongoing  Note: Eating well, follows directions, does not participate in groups or goal wrap up. Appears paranoid this shift. Problem: Discharge Planning:  Goal: Discharged to appropriate level of care  Description: Discharged to appropriate level of care  Outcome: Ongoing  Note: Discharge planning in progress. Problem: Activity:  Goal: Sleeping patterns will improve  Description: Sleeping patterns will improve  Outcome: Ongoing  Note: Pt has been sleeping on and off this shift. Requested trazodone this shift.       Problem: Altered Mood, Deterioration in Function:  Goal: symptoms  Description: Able to verbalize and/or display a decrease in depressive symptoms  Outcome: Not Met This Shift  Note: Raine-refused assessment questions. Appears depressed unable to rate mood states \"just don't want to think\". Isolated to room all shift. Affect flat. Goal: Ability to disclose and discuss suicidal ideas will improve  Description: Ability to disclose and discuss suicidal ideas will improve  Outcome: Not Met This Shift  Note: Raine-refused assessment      Problem: Pain:  Goal: Pain level will decrease  Description: Pain level will decrease  Outcome: Not Met This Shift  Note: Complained of a 3 lower back pain with 10 being the worst was given tylenol with relief.        Problem: Social interaction  Goal: Increased social interaction  3/12/2020 1520 by August Kast  Outcome: Not Met This Shift     Problem: Altered Mood, Psychotic Behavior:  Goal: Able to verbalize reality based thinking  Description: Able to verbalize reality based thinking  Outcome: Completed  Note: Duplicate see above

## 2020-03-13 NOTE — PROGRESS NOTES
Department of Psychiatry  Progress Note     Chief Complaint:  Schizoaffective disorder [facility])     PROGRESS:  yolyouhts more organized, no clanging today  Preoccupied with pain, refusing to answer any assessment questions; rather talks over this provider with c/o pain despite being completely unrelated to the question being asked. I spoke to her sister yesterday at length. She shared a long history of struggling with her sister's drug abuse. Within the last year, she has been abusing amphetamines, opiates, LSD (detected by drug screen at outpatient hospital) and possibly inhalants (computer keyboard ). Apparently, shortly before admission, Arcenio Peñaloza reunited with her daughter whom she has not seen in over 21 years. The daughter was giving her thousands of dollars  at a time meant for living expenses.     Suicidal ideations: denies    Compliance with medications: good   Medication side effects: absent  ROS: Patient has new complaints:  no  Sleep quality: good  Attending groups: no      OBJECTIVE      Medications  Current Facility-Administered Medications: gabapentin (NEURONTIN) tablet 600 mg, 600 mg, Oral, BID  haloperidol lactate (HALDOL) injection 5 mg, 5 mg, Intramuscular, Q8H PRN  LORazepam (ATIVAN) injection 2 mg, 2 mg, Intramuscular, Q8H PRN  albuterol sulfate  (90 Base) MCG/ACT inhaler 1 puff, 1 puff, Inhalation, Q6H PRN  hydrOXYzine (ATARAX) tablet 50 mg, 50 mg, Oral, Q4H PRN  therapeutic multivitamin-minerals 1 tablet, 1 tablet, Oral, Daily  nortriptyline (PAMELOR) capsule 50 mg, 50 mg, Oral, Nightly  QUEtiapine (SEROQUEL) tablet 300 mg, 300 mg, Oral, Nightly  acetaminophen (TYLENOL) tablet 650 mg, 650 mg, Oral, Q4H PRN  ibuprofen (ADVIL;MOTRIN) tablet 400 mg, 400 mg, Oral, Q6H PRN  traZODone (DESYREL) tablet 50 mg, 50 mg, Oral, Nightly PRN  magnesium hydroxide (MILK OF MAGNESIA) 400 MG/5ML suspension 30 mL, 30 mL, Oral, Daily PRN  aluminum & magnesium hydroxide-simethicone (MAALOX) [phone] Electronically signed by Linda Ng PA-C on 3/13/2020 at 1:02 PM Reviewed patient's current plan of care and vital signs with nursing staff. Psychiatry Attending Attestation     I assessed this patient and [facility] the case and [facility] I have reviewed the above documentation and I agree with the findings and treatment plan with the following updates. Patient conversation is much more lenient today. Continues to be very fixated about pain medications. She was dismissive and irritable this morning. Continues to report feeling like she has a chip implanted in her arm. She stopped picking at it. Patient no longer received any emergency medication since yesterday morning. We had to file for court probate process after she refused to sign in voluntarily. [facility] spoke to her sister who mentioned that patient has been abusing amphetamines opiates and LSD recently. Will continue same medication today and consider tramadol as needed for pain. Case discussed with staff in treatment team this morning. Electronically signed by Katrin Sharpe MD on 3/13/20 at 8:03 PM EDT    **This report has been created using voice recognition software. It may contain minor errors which are inherent in voice recognition technology. **

## 2020-03-14 PROCEDURE — 1240000000 HC EMOTIONAL WELLNESS R&B

## 2020-03-14 PROCEDURE — APPSS30 APP SPLIT SHARED TIME 16-30 MINUTES: Performed by: PHYSICIAN ASSISTANT

## 2020-03-14 PROCEDURE — 6370000000 HC RX 637 (ALT 250 FOR IP): Performed by: PHYSICIAN ASSISTANT

## 2020-03-14 PROCEDURE — 6370000000 HC RX 637 (ALT 250 FOR IP): Performed by: PSYCHIATRY & NEUROLOGY

## 2020-03-14 RX ADMIN — HYDROXYZINE HYDROCHLORIDE 50 MG: 25 TABLET ORAL at 20:11

## 2020-03-14 RX ADMIN — NORTRIPTYLINE HYDROCHLORIDE 50 MG: 25 CAPSULE ORAL at 20:12

## 2020-03-14 RX ADMIN — QUETIAPINE FUMARATE 300 MG: 100 TABLET ORAL at 20:11

## 2020-03-14 RX ADMIN — MULTIPLE VITAMINS W/ MINERALS TAB 1 TABLET: TAB at 08:25

## 2020-03-14 RX ADMIN — GABAPENTIN 600 MG: 600 TABLET, FILM COATED ORAL at 08:25

## 2020-03-14 RX ADMIN — HYDROXYZINE HYDROCHLORIDE 50 MG: 25 TABLET ORAL at 16:11

## 2020-03-14 RX ADMIN — GABAPENTIN 600 MG: 600 TABLET, FILM COATED ORAL at 20:14

## 2020-03-14 RX ADMIN — IBUPROFEN 400 MG: 200 TABLET, FILM COATED ORAL at 14:56

## 2020-03-14 RX ADMIN — TRAZODONE HYDROCHLORIDE 50 MG: 50 TABLET ORAL at 20:16

## 2020-03-14 RX ADMIN — TRAMADOL HYDROCHLORIDE 50 MG: 50 TABLET, FILM COATED ORAL at 20:20

## 2020-03-14 RX ADMIN — TRAMADOL HYDROCHLORIDE 50 MG: 50 TABLET, FILM COATED ORAL at 05:27

## 2020-03-14 ASSESSMENT — PAIN SCALES - GENERAL
PAINLEVEL_OUTOF10: 0
PAINLEVEL_OUTOF10: 6
PAINLEVEL_OUTOF10: 7
PAINLEVEL_OUTOF10: 6
PAINLEVEL_OUTOF10: 0
PAINLEVEL_OUTOF10: 8
PAINLEVEL_OUTOF10: 5

## 2020-03-14 ASSESSMENT — PAIN DESCRIPTION - ORIENTATION
ORIENTATION: LOWER;MID
ORIENTATION: LOWER;MID

## 2020-03-14 ASSESSMENT — PAIN DESCRIPTION - DESCRIPTORS
DESCRIPTORS: ACHING
DESCRIPTORS: ACHING

## 2020-03-14 ASSESSMENT — PAIN DESCRIPTION - PAIN TYPE
TYPE: CHRONIC PAIN
TYPE: CHRONIC PAIN

## 2020-03-14 ASSESSMENT — PAIN DESCRIPTION - FREQUENCY
FREQUENCY: INTERMITTENT
FREQUENCY: CONTINUOUS

## 2020-03-14 ASSESSMENT — PAIN DESCRIPTION - ONSET
ONSET: ON-GOING
ONSET: ON-GOING

## 2020-03-14 ASSESSMENT — PAIN DESCRIPTION - LOCATION
LOCATION: BACK
LOCATION: BACK

## 2020-03-14 ASSESSMENT — PAIN DESCRIPTION - PROGRESSION
CLINICAL_PROGRESSION: NOT CHANGED
CLINICAL_PROGRESSION: NOT CHANGED

## 2020-03-14 NOTE — PLAN OF CARE
Problem: Altered Mood, Deterioration in Function:  Goal: Ability to perform activities of daily living will improve  Description: Ability to perform activities of daily living will improve  3/14/2020 0106 by Geni Barros RN  Outcome: Ongoing  Note: the patient independently performed ADLs. 3/13/2020 1146 by Cristiano Platt RN  Outcome: Met This Shift  Note: Pt able to do own AD:'s   Goal: Able to verbalize reality based thinking  Description: Able to verbalize reality based thinking  3/14/2020 0106 by Geni Barros RN  Outcome: Ongoing  Note: the patient is alert and oriented to person and place. Refused to answer when asked if she knew the date or situation. Denies hallucinations. 3/13/2020 1146 by Cristiano Platt RN  Outcome: Ongoing  Note: Pt orientated to person and place   Goal: Maintenance of adequate nutrition will improve  Description: Maintenance of adequate nutrition will improve  3/14/2020 0106 by Geni Barros RN  Outcome: Ongoing  Note: the patient ate 100% of evening snack. 3/13/2020 1146 by Cristiano Platt RN  Outcome: Met This Shift  Note: Pt eating without issues or concerns   Goal: Participates in care planning  Description: Participates in care planning  3/14/2020 0106 by Geni Barros RN  Outcome: Ongoing  Note: the patient participates in interdisciplinary care planning. 3/13/2020 1146 by Cristiano Platt RN  Outcome: Ongoing  Note: Patient discussed treatment plan with physician/medical staff, not attending group, and compliant with medications.       Problem: Altered Mood, Psychotic Behavior:  Goal: Able to demonstrate trust by eating, participating in treatment and following staff's direction  Description: Able to demonstrate trust by eating, participating in treatment and following staff's direction  3/14/2020 0106 by Geni Barros RN  Outcome: Ongoing  Note: the patient ate 100% of evening snack but refused to participate in interdisciplinary care Winston Tejeda RN  Outcome: Ongoing  Note: The patient is checked every 15 minutes during day time hours and every 30 minutes during night time hours to ensure safety. 3/13/2020 1146 by Rufina Higgins RN  Outcome: Ongoing  Note: Maintained in safe and secure environment. Patient did not fill out safety plan this shift. Problem: Altered Mood, Depressive Behavior:  Goal: Participates in care planning  Description: Participates in care planning  3/14/2020 0106 by Winston Tejeda RN  Outcome: Ongoing  Note: the patient participates in interdisciplinary care planning. 3/13/2020 1146 by Rufina Higgins RN  Outcome: Ongoing  Note: Patient discussed treatment plan with physician/medical staff, not attending group, and compliant with medications. Goal: Able to verbalize and/or display a decrease in depressive symptoms  Description: Able to verbalize and/or display a decrease in depressive symptoms  3/14/2020 0106 by Winston Tejeda RN  Outcome: Ongoing  Note: the patient states she is depressed and when asked mood was irritable. 3/13/2020 1146 by Rufina Higgins RN  Outcome: Ongoing  Note: Pt affect flat, irritable and angry. Pt tearful asking about getting more pain meds. Mood labile   Goal: Absence of self-harm  Description: Absence of self-harm  3/14/2020 0106 by Winston Tejeda RN  Outcome: Ongoing  Note: The patient remains absent of self-harm. 3/13/2020 1146 by Rufina Higgins RN  Outcome: Ongoing  Note: No self harm behaviors were observed or reported so far this shift. Remains on every 15 minutes precautions for safety. Problem: Pain:  Goal: Pain level will decrease  Description: Pain level will decrease  3/14/2020 0106 by Winston Tejeda RN  Outcome: Ongoing  Note: the patient rates pain as a 7 out of 10. Chronic pain in her back. 3/13/2020 1146 by Rufina Higgins RN  Outcome: Ongoing  Note: Pt stated that she remains to have back pain.  Pt taking pain meds without relief. Problem: Skin Integrity:  Goal: Will show no infection signs and symptoms  Description: Will show no infection signs and symptoms  3/14/2020 0106 by Patricio Benavides RN  Outcome: Ongoing  Note: the patient refused a skin assessment. 3/13/2020 1146 by Thuy Schwartz RN  Outcome: Not Met This Shift  Note: Pt refused to let staff assess abrasion on right wrist      Problem: Social interaction  Goal: Increased social interaction  3/13/2020 1503 by Lauryn Richards  Outcome: Not Met This Shift     Problem: Altered Mood, Deterioration in Function:  Goal: Patient specific goal  Description: Patient specific goal  3/13/2020 1146 by Thuy Schwartz RN  Outcome: Completed  Note: Patient goal was not set     Problem: Altered Mood, Depressive Behavior:  Goal: Patient specific goal  Description: Patient specific goal  3/13/2020 1146 by Thuy Schwartz RN  Outcome: Completed  Note: Patient goal was not set  Goal: Ability to disclose and discuss suicidal ideas will improve  Description: Ability to disclose and discuss suicidal ideas will improve  3/13/2020 1146 by Thuy Schwartz RN  Outcome: Completed  Note: Pt denied any SI/HI     Problem: Activity:  Goal: Sleeping patterns will improve  Description: Sleeping patterns will improve  3/13/2020 1146 by Thuy Schwartz RN  Outcome: Completed   Care plan reviewed with patient. Patient verbalize understanding of the plan of care and contribute to goal setting.

## 2020-03-14 NOTE — PROGRESS NOTES
Her blood pressure is 128/73 and her pulse is 76. Her respiration is 16 and oxygen saturation is 96%. Lab Results   Component Value Date    WBC 8.5 03/11/2020    HGB 12.8 03/11/2020    HCT 38.6 03/11/2020     03/11/2020    ALT 32 03/11/2020    AST 23 03/11/2020     03/11/2020    K 3.5 03/11/2020     03/11/2020    CREATININE 1.0 03/11/2020    BUN 12 03/11/2020    CO2 20 (L) 03/11/2020    TSH 3.570 01/01/2020    INR 0.91 02/11/2020          Mental Status Examination:   Level of consciousness:  Within normal limits  Appearance: laying in hospital bed, poor grooming  Behavior/Motor: no abnormal movements, tics or mannerisms noted   Gait: no abnormalities noted. Attitude toward examiner: uncooperative, dismissive  Speech: low productivity  Mood: labile  Affect: intense  Thought processes:goal directed  Thought content: somatically preoccupied    Suicidal ideation:  denies                           Homicidal ideations: denies                            Hallucinations: denies                           Delusions: denies  Cognition: oriented to self and location only  Memory: in tact  Insight and Judgement are limited  Attention and concentration are limited      ASSESSMENT     Schizoaffective disorder ([facility] [address]   Substance use disorder; opiates, amphetamines, hallucinogens, inhalants    PLAN    Patient's cognition continues to improve  No medication adjustments today  Continue to encourage patient to attend groups and interact with peers on unit  Attempt to develop insight, psycho-education and supportive therapy conducted. Possible discharge: TBD  Follow-up: reestablish with [facility]     Electronically signed by Phu Sanders PA-C on 3/14/2020 at 12:41 PM Reviewed patient's current plan of care and vital signs with nursing staff.

## 2020-03-14 NOTE — PLAN OF CARE
anxious and irritable. Problem: KNOWLEDGE DEFICIT,EDUCATION,DISCHARGE PLAN  Goal: Knowledge - personal safety  3/14/2020 1359 by Peewee Tran RN  Outcome: Ongoing  Note: Patient has not started working toward completing her safety plan and her recovery program and resiliency. 3/14/2020 0106 by Constance Olmstead RN  Outcome: Ongoing  Note: The patient is checked every 15 minutes during day time hours and every 30 minutes during night time hours to ensure safety. Problem: Altered Mood, Depressive Behavior:  Goal: Able to verbalize and/or display a decrease in depressive symptoms  Description: Able to verbalize and/or display a decrease in depressive symptoms  3/14/2020 1359 by JOSIE Oro RN  Outcome: Ongoing  Note: Patient rates mood #4 on scale of #1-10 with 10 the happiest.  She is depressed, flat, constricted, irritable, dismissive, abrupt, and uncooperative with carrying her dirty meal trays out of room. 3/14/2020 0106 by Constance Olmstead RN  Outcome: Ongoing  Note: the patient states she is depressed and when asked mood was irritable. Goal: Absence of self-harm  Description: Absence of self-harm  3/14/2020 1359 by Peewee Tran RN  Outcome: Ongoing  Note: Patient has no self mutilation thoughts and or  behaviors and gives verbal contract to seek staff and inform if they develop any. She remains on 15 minute unit precautions for safety and unit protocols. 3/14/2020 0106 by Constance Olmstead RN  Outcome: Ongoing  Note: The patient remains absent of self-harm. Problem: Pain:  Goal: Pain level will decrease  Description: Pain level will decrease  3/14/2020 1359 by JOSIE Oro RN  Outcome: Ongoing  Note: She has chronic back pain and was asking Manuelito Campbell. this am for PRN Vicodin. She was irritable when not given Vicodin and explained that we do not give that on this unit. 3/14/2020 0106 by Constance Olmstead RN  Outcome: Ongoing  Note: the patient rates pain as a 7 out of 10. Chronic pain in her back. Problem: Skin Integrity:  Goal: Will show no infection signs and symptoms  Description: Will show no infection signs and symptoms  3/14/2020 1359 by JOSIE Burdick RN  Outcome: Ongoing  Note: No signs or symptoms of infection noted, vital signs stable. 3/14/2020 0106 by Winston Tejeda RN  Outcome: Ongoing  Note: the patient refused a skin assessment. Problem: Altered Mood, Deterioration in Function:  Goal: Participates in care planning  Description: Participates in care planning  3/14/2020 1359 by JOSIE Burdick RN  Outcome: Not Met This Shift  Note: Patient is compliant with medications,  however is isolative, dismissive, and is not attending unit groups or socializing, or setting daily goals for improved mental and emotional health. 3/14/2020 0106 by Winston Tejeda RN  Outcome: Ongoing  Note: the patient participates in interdisciplinary care planning. Problem: Altered Mood, Psychotic Behavior:  Goal: Ability to interact with others will improve  Description: Ability to interact with others will improve  3/14/2020 1359 by JOSIE Burdick RN  Outcome: Not Met This Shift  Note: No interaction noted with peers. Isolative to room and bed all day. She is out only to  meals and takes tray back to room. 3/14/2020 0106 by Winston Tejeda RN  Outcome: Ongoing  Note: the patient isolated to bedroom. Problem: Altered Mood, Depressive Behavior:  Goal: Participates in care planning  Description: Participates in care planning  3/14/2020 1359 by JOSIE Burdick RN  Outcome: Not Met This Shift  Note: Patient is compliant with medications,  however is isolative, dismissive, and is not attending unit groups or socializing, or setting daily goals for improved mental and emotional health. 3/14/2020 0106 by Winston Tejeda RN  Outcome: Ongoing  Note: the patient participates in interdisciplinary care planning.       Problem: Social interaction  Goal: Increased social interaction  Intervention: Encourage increased socialization through group therapy  3/14/2020 1334 by KIMI Mitchell  Note: Pt has not attended any group therapy sessions offered today and has been isolating in her room throughout the day. Pt does not interact with staff unless prompted and does not socialize with peers at all throughout the day. Pt will continue to be encouraged to attend group therapy sessions and participate appropriately. Pt will continue to be encouraged to socialize with peers on the unit outside of group therapy sessions. Pt will continue to progress toward social interaction goal.     Problem: Activity:  Intervention: Encourage regular bedtime schedules and rituals  Note: Attempted to educate to get up out of bed during day so she can sleep better at night. She is dismissive, irritable and resistive to teaching or encouragement to attend groups. She is not engaged in unit groups or activities      Care plan reviewed with patient. Patient did not verbalized understanding of the plan of care or contribute to goal setting.

## 2020-03-14 NOTE — PROGRESS NOTES
Group Therapy Note    Date: 3/13/2020  Start Time: 2000  End Time:  2020    Type of Group: Relaxation    Patient's Goal:  Did not make a goal.    Notes:  Refused to go to group.     Discipline Responsible: Registered Nurse    Signature:  Naldo Fuentes RN

## 2020-03-14 NOTE — PROGRESS NOTES
Confabulation  Insight and Judgment: No  Insight and Judgment: Poor Judgment, Poor Insight, Unmotivated  Present Suicidal Ideation: No  Present Homicidal Ideation: No    Daily Assessment Last Entry:   Daily Sleep (WDL): Within Defined Limits         Patient Currently in Pain: No(\"when I am up moving\")  Daily Nutrition (WDL): Within Defined Limits  Appetite Change: No appetite  Barriers to Nutrition: None  Level of Assistance: Independent/Self    Patient Monitoring:  Frequency of Checks: 4 times per hour, close    Psychiatric Symptoms:   Depression Symptoms  Depression Symptoms: Loss of interest, Isolative, Impaired concentration  Anxiety Symptoms  Anxiety Symptoms: No problems reported or observed. Nitza Symptoms  Nitza Symptoms: No problems reported or observed. Psychosis Symptoms  Delusion Type: No problems reported or observed. Suicide Risk CSSR-S:  1) Within the past month, have you wished you were dead or wished you could go to sleep and not wake up? : No  2) Have you actually had any thoughts of killing yourself? : No  6) Have you ever done anything, started to do anything, or prepared to do anything to end your life?: No      EDUCATION:   Learner Progress Toward Treatment Goals: Reviewed results and recommendations of this team, Reviewed group plan and strategies, Reviewed signs, symptoms and risk of self harm and violent behavior and Reviewed goals and plan of care    Method: Small group    Outcome: Needs reinforcement, No evidence of Learning and Refused Education    PATIENT GOALS: stabilize psychiatric symptoms    PLAN/TREATMENT RECOMMENDATIONS UPDATE: medication management, supportive therapy, discharge planning   1. How are you progressing toward meeting your main treatment goal? JADE- patient refused participation  2. Are there discharge barriers/lingering problems that need to be addressed? JADE      3. Do you have the ability to pay for your medications? JADE      4.   How is your group

## 2020-03-14 NOTE — PLAN OF CARE
Problem: Social interaction  Goal: Increased social interaction  Intervention: Encourage increased socialization through group therapy  Note: Pt has not attended any group therapy sessions offered today and has been isolating in her room throughout the day. Pt does not interact with staff unless prompted and does not socialize with peers at all throughout the day. Pt will continue to be encouraged to attend group therapy sessions and participate appropriately. Pt will continue to be encouraged to socialize with peers on the unit outside of group therapy sessions.  Pt will continue to progress toward social interaction goal.

## 2020-03-15 PROCEDURE — 6370000000 HC RX 637 (ALT 250 FOR IP): Performed by: PSYCHIATRY & NEUROLOGY

## 2020-03-15 PROCEDURE — APPSS30 APP SPLIT SHARED TIME 16-30 MINUTES: Performed by: PHYSICIAN ASSISTANT

## 2020-03-15 PROCEDURE — 6370000000 HC RX 637 (ALT 250 FOR IP): Performed by: PHYSICIAN ASSISTANT

## 2020-03-15 PROCEDURE — 1240000000 HC EMOTIONAL WELLNESS R&B

## 2020-03-15 RX ORDER — NORTRIPTYLINE HYDROCHLORIDE 25 MG/1
75 CAPSULE ORAL NIGHTLY
Status: DISCONTINUED | OUTPATIENT
Start: 2020-03-15 | End: 2020-03-19 | Stop reason: HOSPADM

## 2020-03-15 RX ADMIN — IBUPROFEN 400 MG: 200 TABLET, FILM COATED ORAL at 14:54

## 2020-03-15 RX ADMIN — HYDROXYZINE HYDROCHLORIDE 50 MG: 25 TABLET ORAL at 15:28

## 2020-03-15 RX ADMIN — GABAPENTIN 600 MG: 600 TABLET, FILM COATED ORAL at 10:09

## 2020-03-15 RX ADMIN — QUETIAPINE FUMARATE 300 MG: 100 TABLET ORAL at 20:30

## 2020-03-15 RX ADMIN — NORTRIPTYLINE HYDROCHLORIDE 75 MG: 25 CAPSULE ORAL at 20:30

## 2020-03-15 RX ADMIN — GABAPENTIN 600 MG: 600 TABLET, FILM COATED ORAL at 20:30

## 2020-03-15 RX ADMIN — TRAMADOL HYDROCHLORIDE 50 MG: 50 TABLET, FILM COATED ORAL at 10:09

## 2020-03-15 RX ADMIN — IBUPROFEN 400 MG: 200 TABLET, FILM COATED ORAL at 07:16

## 2020-03-15 RX ADMIN — ACETAMINOPHEN 650 MG: 325 TABLET ORAL at 17:19

## 2020-03-15 RX ADMIN — TRAZODONE HYDROCHLORIDE 50 MG: 50 TABLET ORAL at 20:29

## 2020-03-15 RX ADMIN — MULTIPLE VITAMINS W/ MINERALS TAB 1 TABLET: TAB at 10:10

## 2020-03-15 ASSESSMENT — PAIN DESCRIPTION - ONSET
ONSET: ON-GOING

## 2020-03-15 ASSESSMENT — PAIN DESCRIPTION - LOCATION
LOCATION: BACK

## 2020-03-15 ASSESSMENT — PAIN DESCRIPTION - FREQUENCY
FREQUENCY: INTERMITTENT

## 2020-03-15 ASSESSMENT — PAIN DESCRIPTION - PAIN TYPE
TYPE: CHRONIC PAIN

## 2020-03-15 ASSESSMENT — PAIN DESCRIPTION - DESCRIPTORS
DESCRIPTORS: ACHING

## 2020-03-15 ASSESSMENT — PAIN SCALES - GENERAL
PAINLEVEL_OUTOF10: 7
PAINLEVEL_OUTOF10: 6
PAINLEVEL_OUTOF10: 7

## 2020-03-15 ASSESSMENT — PAIN - FUNCTIONAL ASSESSMENT
PAIN_FUNCTIONAL_ASSESSMENT: PREVENTS OR INTERFERES SOME ACTIVE ACTIVITIES AND ADLS
PAIN_FUNCTIONAL_ASSESSMENT: ACTIVITIES ARE NOT PREVENTED
PAIN_FUNCTIONAL_ASSESSMENT: ACTIVITIES ARE NOT PREVENTED

## 2020-03-15 ASSESSMENT — PAIN DESCRIPTION - PROGRESSION
CLINICAL_PROGRESSION: NOT CHANGED

## 2020-03-15 ASSESSMENT — PAIN DESCRIPTION - ORIENTATION
ORIENTATION: MID
ORIENTATION: LOWER;MID

## 2020-03-15 NOTE — PROGRESS NOTES
Discharge Planning-Mervat is to go to [facility] for a walk in assessment.  Walk in times are; Monday and Thursday at 1:30 and 3:30 PM, Tuesday and Wednesday at 0930 and 1030 AM

## 2020-03-15 NOTE — PLAN OF CARE
RN  Outcome: Not Met This Shift  Note: Patient is compliant with medications,  however is isolative, dismissive, and is not attending unit groups or socializing, or setting daily goals for improved mental and emotional health. Problem: Altered Mood, Psychotic Behavior:  Goal: Able to demonstrate trust by eating, participating in treatment and following staff's direction  Description: Able to demonstrate trust by eating, participating in treatment and following staff's direction  3/15/2020 0300 by Elvis Lloyd LPN  Outcome: Met This Shift  Note: Pt is able to demonstrate trust by eating, good med compliance and following staff direction   3/14/2020 1359 by Ildefonso Lee RN  Outcome: Ongoing  Note: She is eating 100% of meals and taking her PO medications. She is dismissive and irritable, demanding and yelled at this nurse today Digna Johanny me my scrubs\" and slammed door to room   Goal: Compliance with prescribed medication regimen will improve  Description: Compliance with prescribed medication regimen will improve  3/15/2020 0300 by Elvis Lloyd LPN  Outcome: Met This Shift  Note: Pt compliant with prescribed meds   3/14/2020 1359 by JOSIE Oquendo RN  Outcome: Met This Shift  Note: She is compliant with PO medications. She was requesting PRN Vicodin to [facility] this am.  She is preoccupied with back pain and need for Vicodin. Problem: Anxiety:  Goal: Level of anxiety will decrease  Description: Level of anxiety will decrease  3/15/2020 0300 by Elvis Lloyd LPN  Outcome: Met This Shift  Note: Pt denies anxiety this shift   3/14/2020 1359 by JOSIE Oquendo RN  Outcome: Ongoing  Note: She has been in bed sleeping all day, out only to get meal tray to go back to her room. No anxiety noted as she has been sleeping.       Problem: Altered Mood, Depressive Behavior:  Goal: Participates in care planning  Description: Participates in care planning  3/15/2020 0300 by Elvis Lloyd LPN  Outcome: Met This Shift  Note: Pt does participate in care planning   3/14/2020 1359 by Peewee Tran RN  Outcome: Not Met This Shift  Note: Patient is compliant with medications,  however is isolative, dismissive, and is not attending unit groups or socializing, or setting daily goals for improved mental and emotional health. Goal: Absence of self-harm  Description: Able to verbalize and/or display a decrease in depressive symptoms  3/15/2020 0300 by Alexander Luna LPN  Outcome: Met This Shift  Note: Pt free of self harm this shift   3/14/2020 1359 by JOSIE Oro RN  Outcome: Ongoing  Note: Patient has no self mutilation thoughts and or  behaviors and gives verbal contract to seek staff and inform if they develop any. She remains on 15 minute unit precautions for safety and unit protocols. Problem: Skin Integrity:  Goal: Will show no infection signs and symptoms  Description: Will show no infection signs and symptoms  3/15/2020 0300 by Alexander Luna LPN  Outcome: Met This Shift  Note: No signs/symptoms of infection this shift   3/14/2020 1359 by JOSIE Oro RN  Outcome: Ongoing  Note: No signs or symptoms of infection noted, vital signs stable. Problem: Discharge Planning:  Goal: Discharged to appropriate level of care  Description: Discharged to appropriate level of care  3/15/2020 0300 by Alexander Luna LPN  Outcome: Ongoing  Note: D/C planning in progress   3/14/2020 1359 by JOSIE Oro RN  Outcome: Ongoing  Note: Working with multidisciplinary treatment team for ongoing continuity of care and relapse prevention.       Problem: KNOWLEDGE DEFICIT,EDUCATION,DISCHARGE PLAN  Goal: Knowledge - personal safety  3/15/2020 0300 by Alexander Luna LPN  Outcome: Ongoing  Note: Pt encouraged to complete personal safety plan prior to D/C   3/14/2020 1359 by JOSIE Oro RN  Outcome: Ongoing  Note: Patient has not started working toward completing her safety plan and her recovery program and resiliency. Problem: Altered Mood, Psychotic Behavior:  Goal: Ability to interact with others will improve  Description: Ability to interact with others will improve  3/15/2020 0300 by Santosh Joseph LPN  Outcome: Not Met This Shift  Note: Pt isolative to room/bed this shift  3/14/2020 1359 by JOSIE German RN  Outcome: Not Met This Shift  Note: No interaction noted with peers. Isolative to room and bed all day. She is out only to  meals and takes tray back to room. Problem: Altered Mood, Depressive Behavior:  Goal: Able to verbalize and/or display a decrease in depressive symptoms  Description: Participates in care planning  3/15/2020 0300 by Santosh Joseph LPN  Outcome: Not Met This Shift  Note: Pt found very tearful at beginning of shift   3/14/2020 1359 by JOSIE German RN  Outcome: Ongoing  Note: Patient rates mood #4 on scale of #1-10 with 10 the happiest.  She is depressed, flat, constricted, irritable, dismissive, abrupt, and uncooperative with carrying her dirty meal trays out of room. Problem: Pain:  Goal: Pain level will decrease  Description: Pain level will decrease  3/15/2020 0300 by Santosh Joseph LPN  Outcome: Not Met This Shift  Note: Rates pain 7/10   3/14/2020 1359 by JOSIE German RN  Outcome: Ongoing  Note: She has chronic back pain and was asking Fanny Brooks. this am for PRN Vicodin. She was irritable when not given Vicodin and explained that we do not give that on this unit. Problem: Social interaction  Goal: Increased social interaction  Intervention: Encourage increased socialization through group therapy  3/14/2020 1334 by KIMI Dewitt  Note: Pt has not attended any group therapy sessions offered today and has been isolating in her room throughout the day. Pt does not interact with staff unless prompted and does not socialize with peers at all throughout the day.  Pt will continue to be encouraged to

## 2020-03-15 NOTE — PLAN OF CARE
Problem: Altered Mood, Deterioration in Function:  Goal: Ability to perform activities of daily living will improve  Description: Ability to perform activities of daily living will improve  Outcome: Ongoing  Note: Patient completed ADLs independently   Goal: Able to verbalize reality based thinking  Description: Able to verbalize reality based thinking  Outcome: Ongoing  Note: Patient alert and oriented x3  Goal: Maintenance of adequate nutrition will improve  Description: Maintenance of adequate nutrition will improve  Outcome: Ongoing  Note: Patient appetite fair   Goal: Participates in care planning  Description: Participates in care planning  Outcome: Ongoing  Note: Takes part in care planning     Problem: Altered Mood, Psychotic Behavior:  Goal: Able to demonstrate trust by eating, participating in treatment and following staff's direction  Description: Able to demonstrate trust by eating, participating in treatment and following staff's direction  Outcome: Ongoing  Note: Patient takes medications per order  Goal: Ability to interact with others will improve  Description: Ability to interact with others will improve  Outcome: Ongoing  Note: Patient isolates to room  Goal: Compliance with prescribed medication regimen will improve  Description: Compliance with prescribed medication regimen will improve  Outcome: Ongoing  Note: Takes meds per orders     Problem: Discharge Planning:  Goal: Discharged to appropriate level of care  Description: Discharged to appropriate level of care  Outcome: Ongoing  Note: Ongoing      Problem: Anxiety:  Goal: Level of anxiety will decrease  Description: Level of anxiety will decrease  Outcome: Ongoing  Note: Patient complains of anxiety, meds per orders      Problem: KNOWLEDGE DEFICIT,EDUCATION,DISCHARGE PLAN  Goal: Knowledge - personal safety  Outcome: Ongoing     Problem: Altered Mood, Depressive Behavior:  Goal: Participates in care planning  Description: Participates in care planning  Outcome: Ongoing  Note: Takes part in care planning  Goal: Able to verbalize and/or display a decrease in depressive symptoms  Description: Able to verbalize and/or display a decrease in depressive symptoms  Outcome: Ongoing  Note: JADE patient doesn't answer assessment questions. She isolates to bed, is irritable and has decreased motivation. Goal: Absence of self-harm  Description: Absence of self-harm  Outcome: Ongoing  Note: Denies thoughts of self harm      Problem: Pain:  Goal: Pain level will decrease  Description: Pain level will decrease  Outcome: Ongoing  Note: Patient reports pain, see pain assessment. Problem: Skin Integrity:  Goal: Will show no infection signs and symptoms  Description: Will show no infection signs and symptoms  Outcome: Ongoing  Note: None noted    Care plan reviewed with patient.   Patient does verbalize understanding of the plan of care and does contribute to goal setting

## 2020-03-16 PROCEDURE — 99232 SBSQ HOSP IP/OBS MODERATE 35: CPT | Performed by: PSYCHIATRY & NEUROLOGY

## 2020-03-16 PROCEDURE — 1240000000 HC EMOTIONAL WELLNESS R&B

## 2020-03-16 PROCEDURE — 6370000000 HC RX 637 (ALT 250 FOR IP): Performed by: PHYSICIAN ASSISTANT

## 2020-03-16 PROCEDURE — APPSS30 APP SPLIT SHARED TIME 16-30 MINUTES: Performed by: PHYSICIAN ASSISTANT

## 2020-03-16 PROCEDURE — 90833 PSYTX W PT W E/M 30 MIN: CPT | Performed by: PSYCHIATRY & NEUROLOGY

## 2020-03-16 PROCEDURE — 6370000000 HC RX 637 (ALT 250 FOR IP): Performed by: PSYCHIATRY & NEUROLOGY

## 2020-03-16 RX ORDER — GABAPENTIN 600 MG/1
600 TABLET ORAL 3 TIMES DAILY
Status: DISCONTINUED | OUTPATIENT
Start: 2020-03-16 | End: 2020-03-17

## 2020-03-16 RX ADMIN — GABAPENTIN 600 MG: 600 TABLET, FILM COATED ORAL at 08:20

## 2020-03-16 RX ADMIN — TRAMADOL HYDROCHLORIDE 50 MG: 50 TABLET, FILM COATED ORAL at 16:49

## 2020-03-16 RX ADMIN — HYDROXYZINE HYDROCHLORIDE 50 MG: 25 TABLET ORAL at 13:44

## 2020-03-16 RX ADMIN — TRAMADOL HYDROCHLORIDE 50 MG: 50 TABLET, FILM COATED ORAL at 06:16

## 2020-03-16 RX ADMIN — IBUPROFEN 400 MG: 200 TABLET, FILM COATED ORAL at 08:19

## 2020-03-16 RX ADMIN — GABAPENTIN 600 MG: 600 TABLET, FILM COATED ORAL at 20:23

## 2020-03-16 RX ADMIN — ACETAMINOPHEN 650 MG: 325 TABLET ORAL at 11:10

## 2020-03-16 RX ADMIN — QUETIAPINE FUMARATE 300 MG: 100 TABLET ORAL at 20:23

## 2020-03-16 RX ADMIN — GABAPENTIN 600 MG: 600 TABLET, FILM COATED ORAL at 13:44

## 2020-03-16 RX ADMIN — HYDROXYZINE HYDROCHLORIDE 50 MG: 25 TABLET ORAL at 06:16

## 2020-03-16 RX ADMIN — MULTIPLE VITAMINS W/ MINERALS TAB 1 TABLET: TAB at 08:19

## 2020-03-16 RX ADMIN — NORTRIPTYLINE HYDROCHLORIDE 75 MG: 25 CAPSULE ORAL at 20:23

## 2020-03-16 ASSESSMENT — PAIN DESCRIPTION - LOCATION
LOCATION: BACK
LOCATION: BACK

## 2020-03-16 ASSESSMENT — PAIN SCALES - GENERAL
PAINLEVEL_OUTOF10: 8
PAINLEVEL_OUTOF10: 4
PAINLEVEL_OUTOF10: 2
PAINLEVEL_OUTOF10: 8
PAINLEVEL_OUTOF10: 10
PAINLEVEL_OUTOF10: 10

## 2020-03-16 ASSESSMENT — PAIN DESCRIPTION - FREQUENCY
FREQUENCY: INTERMITTENT
FREQUENCY: INTERMITTENT

## 2020-03-16 ASSESSMENT — PAIN DESCRIPTION - PROGRESSION
CLINICAL_PROGRESSION: NOT CHANGED
CLINICAL_PROGRESSION: NOT CHANGED

## 2020-03-16 ASSESSMENT — PAIN DESCRIPTION - DIRECTION: RADIATING_TOWARDS: HIPS

## 2020-03-16 ASSESSMENT — PAIN DESCRIPTION - PAIN TYPE
TYPE: CHRONIC PAIN
TYPE: CHRONIC PAIN

## 2020-03-16 ASSESSMENT — PAIN DESCRIPTION - ORIENTATION
ORIENTATION: LOWER
ORIENTATION: LOWER

## 2020-03-16 ASSESSMENT — PAIN DESCRIPTION - ONSET: ONSET: GRADUAL

## 2020-03-16 ASSESSMENT — PAIN DESCRIPTION - DESCRIPTORS: DESCRIPTORS: CRUSHING;SHARP;SHOOTING

## 2020-03-16 NOTE — PLAN OF CARE
Problem: Altered Mood, Deterioration in Function:  Goal: Able to verbalize reality based thinking  Description: Able to verbalize reality based thinking  3/16/2020 1409 by Jez Mckinney LPN  Outcome: Met This Shift  Note: Patient oriented X4, verbalizes reality based thinking  3/16/2020 0115 by Lenord Brittle, RN  Outcome: Ongoing  Note: Pt alert and oriented to all, pt denies statements that were made for the probate  Goal: Maintenance of adequate nutrition will improve  Description: Maintenance of adequate nutrition will improve  3/16/2020 1409 by Jez Mckinney LPN  Outcome: Met This Shift  Note: Adequate nutrition  3/16/2020 0115 by Lenord Brittle, RN  Outcome: Ongoing  Note: Pt eating well and had 100% snack     Problem: Altered Mood, Psychotic Behavior:  Goal: Compliance with prescribed medication regimen will improve  Description: Compliance with prescribed medication regimen will improve  3/16/2020 1409 by Jez Mckinney LPN  Outcome: Met This Shift  Note: Compliant with medications  3/16/2020 0115 by Lenord Brittle, RN  Outcome: Ongoing  Note: Pt took all medications without issue, did not request extras accept for sleep meds     Problem: Altered Mood, Depressive Behavior:  Goal: Absence of self-harm  Description: Absence of self-harm  3/16/2020 1409 by Jez Mckinney LPN  Outcome: Met This Shift  Note: No self harm  3/16/2020 0115 by Lenord Brittle, RN  Outcome: Ongoing  Note: Pt denies thoughts to harm self and remains safe      Problem: Altered Mood, Deterioration in Function:  Goal: Ability to perform activities of daily living will improve  Description: Ability to perform activities of daily living will improve  3/16/2020 1409 by Jez Mckinney LPN  Outcome: Ongoing  Note: Patient performs adl's  3/16/2020 0115 by Lenord Brittle, RN  Outcome: Ongoing  Note: Pt eating per self, came out for snack and more interactive  Goal: Participates in care planning  Description: Participates in care Ongoing  Note: Pt states she is anxious and depressed because of being here and the issues with her pain     Problem: KNOWLEDGE DEFICIT,EDUCATION,DISCHARGE PLAN  Goal: Knowledge - personal safety  3/16/2020 1409 by Sadie Reyes LPN  Outcome: Ongoing  Note: Safety plan yet to be completed  3/16/2020 0115 by Kavya Hsieh RN  Outcome: Ongoing  Note: Pt remained safe and free of harm     Problem: Altered Mood, Depressive Behavior:  Goal: Participates in care planning  Description: Participates in care planning  3/16/2020 1409 by Sadie Reyes LPN  Outcome: Ongoing  Note: Patient compliant with meds, encouragement given to attend groups  3/16/2020 0115 by Kavya Hsieh RN  Outcome: Ongoing  Note: Pt is taking medications, attending and participating in groups and care planning. Pt has good interaction with staff and peers. Pt prefers one to one groups  Goal: Able to verbalize and/or display a decrease in depressive symptoms  Description: Able to verbalize and/or display a decrease in depressive symptoms  3/16/2020 1409 by Sadie Reyes LPN  Outcome: Ongoing  Note: Patient verbalizes having depressive symptoms, has blunt, sad affect, rates mood #4, will continue to monitor  3/16/2020 0115 by Kavya Hsieh RN  Outcome: Ongoing  Note: Pt is taking medications, attending and participating in groups and care planning.  Pt has good interaction with staff and peers      Problem: Pain:  Goal: Pain level will decrease  Description: Pain level will decrease  3/16/2020 1409 by Sadie Reyes LPN  Outcome: Ongoing  Note: Medication given for pain, see MAR  3/16/2020 0115 by Kavya Hsieh RN  Outcome: Ongoing  Note: Pt reports pain was2/10 and tolerable     Problem: Skin Integrity:  Goal: Will show no infection signs and symptoms  Description: Will show no infection signs and symptoms  3/16/2020 1409 by Sadie Reyes LPN  Outcome: Ongoing  Note: No signs or symptoms of infection, will continue to monitor  3/16/2020

## 2020-03-16 NOTE — GROUP NOTE
Group Therapy Note    Date: 3/16/2020    Group Start Time: 0900  Group End Time: 0930  Group Topic: [employer]    [facility]    [address] [place], [place]    Group Therapy Note    Attendees: 4    Pt did not attend 0900 goal group and community meeting. Maximum encouragement provided, 1:1 offered.     Signature:  Giovanny Hernandez

## 2020-03-16 NOTE — PROGRESS NOTES
Case management-I mailed the completed, original,  probate paper work to ALICJA's who is with the [facility].

## 2020-03-16 NOTE — PLAN OF CARE
Pt has not attended any of the groups that have been offered on the unit. Pt has been in her room the majority of the day. Pt will be encouraged to attend the groups that are offered on the unit. Pt will be encouraged to come out of her room and interact with peers. Pt has not met her socialization goal for this shift.

## 2020-03-16 NOTE — PLAN OF CARE
Problem: Altered Mood, Deterioration in Function:  Goal: Ability to perform activities of daily living will improve  Description: Ability to perform activities of daily living will improve  3/16/2020 0115 by Lenord Brittle, RN  Outcome: Ongoing  Note: Pt eating per self, came out for snack and more interactive  3/15/2020 1747 by Tamra Black RN  Outcome: Ongoing  Note: Patient completed ADLs independently   Goal: Able to verbalize reality based thinking  Description: Able to verbalize reality based thinking  3/16/2020 0115 by Lenord Brittle, RN  Outcome: Ongoing  Note: Pt alert and oriented to all, pt denies statements that were made for the probate  3/15/2020 1747 by Tamra Black RN  Outcome: Ongoing  Note: Patient alert and oriented x3  Goal: Maintenance of adequate nutrition will improve  Description: Maintenance of adequate nutrition will improve  3/16/2020 0115 by Lenord Brittle, RN  Outcome: Ongoing  Note: Pt eating well and had 100% snack  3/15/2020 1747 by Tamra Black RN  Outcome: Ongoing  Note: Patient appetite fair   Goal: Participates in care planning  Description: Participates in care planning  3/16/2020 0115 by Lenord Brittle, RN  Outcome: Ongoing  Note: Pt is taking medications, attending and participating in groups and care planning. Pt has good interaction with staff and peers. Pt prefers one to one groups  3/15/2020 1747 by Tamra Black RN  Outcome: Ongoing  Note: Takes part in care planning     Problem: Altered Mood, Psychotic Behavior:  Goal: Able to demonstrate trust by eating, participating in treatment and following staff's direction  Description: Able to demonstrate trust by eating, participating in treatment and following staff's direction  3/16/2020 0115 by Lenord Brittle, RN  Outcome: Ongoing  Note: Pt is taking medications, attending and participating in groups and care planning.  Pt has good interaction with staff and peers   3/15/2020 1747 by Tamra Black RN  Outcome: Ongoing  Note: Patient takes medications per order  Goal: Compliance with prescribed medication regimen will improve  Description: Compliance with prescribed medication regimen will improve  3/16/2020 0115 by Delisa Good RN  Outcome: Ongoing  Note: Pt took all medications without issue, did not request extras accept for sleep meds  3/15/2020 1747 by Eliana Pryor RN  Outcome: Ongoing  Note: Takes meds per orders     Problem: Discharge Planning:  Goal: Discharged to appropriate level of care  Description: Discharged to appropriate level of care  3/16/2020 0115 by Delisa Good RN  Outcome: Ongoing  Note: Pt states she plans to return home but is unsure of follow up  3/15/2020 1747 by Eliana Pryor RN  Outcome: Ongoing  Note: Ongoing      Problem: Anxiety:  Goal: Level of anxiety will decrease  Description: Level of anxiety will decrease  3/16/2020 0115 by Delisa Good RN  Outcome: Ongoing  Note: Pt states she is anxious and depressed because of being here and the issues with her pain  3/15/2020 1747 by Eliana Pryor RN  Outcome: Ongoing  Note: Patient complains of anxiety, meds per orders      Problem: KNOWLEDGE DEFICIT,EDUCATION,DISCHARGE PLAN  Goal: Knowledge - personal safety  3/16/2020 0115 by Delisa Good RN  Outcome: Ongoing  Note: Pt remained safe and free of harm  3/15/2020 1747 by Eliana Pryor RN  Outcome: Ongoing     Problem: Altered Mood, Depressive Behavior:  Goal: Participates in care planning  Description: Participates in care planning  3/16/2020 0115 by Delisa Good RN  Outcome: Ongoing  Note: Pt is taking medications, attending and participating in groups and care planning. Pt has good interaction with staff and peers.  Pt prefers one to one groups  3/15/2020 1747 by Eliana Pryor RN  Outcome: Ongoing  Note: Takes part in care planning  Goal: Able to verbalize and/or display a decrease in depressive symptoms  Description: Able to verbalize and/or display a decrease in

## 2020-03-16 NOTE — PROGRESS NOTES
Department of Psychiatry  Progress Note     Chief Complaint:  Schizoaffective disorder [facility])     PROGRESS:  Improving cognitively. No longer making clang associations; thoughts are logical  Mood remains unstable, endorses continued depression and anxiety. Remains preoccupied with pain, demands that I contact her sister to help set up her surgery. Her somatic preoccupation, almost perseverance really, seems more volitional at this point, less of a formal thought disorder (although this may remain on some level).      Suicidal ideations: denies    Compliance with medications: good   Medication side effects: absent  ROS: Patient has new complaints:  no  Sleep quality: good  Attending groups: attended one group yesterday, otherwise she has largely been isolating to her room      OBJECTIVE      Medications  Current Facility-Administered Medications: gabapentin (NEURONTIN) tablet 600 mg, 600 mg, Oral, TID  nortriptyline (PAMELOR) capsule 75 mg, 75 mg, Oral, Nightly  traMADol (ULTRAM) tablet 50 mg, 50 mg, Oral, BID PRN  haloperidol lactate (HALDOL) injection 5 mg, 5 mg, Intramuscular, Q8H PRN  LORazepam (ATIVAN) injection 2 mg, 2 mg, Intramuscular, Q8H PRN  albuterol sulfate  (90 Base) MCG/ACT inhaler 1 puff, 1 puff, Inhalation, Q6H PRN  hydrOXYzine (ATARAX) tablet 50 mg, 50 mg, Oral, Q4H PRN  therapeutic multivitamin-minerals 1 tablet, 1 tablet, Oral, Daily  QUEtiapine (SEROQUEL) tablet 300 mg, 300 mg, Oral, Nightly  acetaminophen (TYLENOL) tablet 650 mg, 650 mg, Oral, Q4H PRN  ibuprofen (ADVIL;MOTRIN) tablet 400 mg, 400 mg, Oral, Q6H PRN  traZODone (DESYREL) tablet 50 mg, 50 mg, Oral, Nightly PRN  magnesium hydroxide (MILK OF MAGNESIA) 400 MG/5ML suspension 30 mL, 30 mL, Oral, Daily PRN  aluminum & magnesium hydroxide-simethicone (MAALOX) 200-200-20 MG/5ML suspension 30 mL, 30 mL, Oral, Q6H PRN  nicotine (NICODERM CQ) 14 MG/24HR 1 patch, 1 patch, Transdermal, Nightly     Physical     height is 5' 8\" (1.727 m) [facility] I have reviewed the above documentation and I agree with the findings and treatment plan with the following updates. Patient is significantly better today. Her thought process is much more linear. Continues to report feeling like she has a chip implanted in her arm. Denies any other paranoia or delusions. Continues to be very fixated about pain and was very tearful during the interview. Patient does report having a psychotic breakdown recently and could not recollect any incidents or events that led to her admission. Patient is adamantly denying about using any drugs recently however I mentioned that her sister believes that she has been abusing inhalants and LSD. Patient reports that she has been dealing with severe racing thoughts all night that are keeping her up. She also reports feeling sad down and low. She reports feeling very helpless and hopeless about her situation. Discussed with her about increasing her gabapentin to help with her pain. She understood and agreed to the plan. Patient agreed to sign in voluntarily and get help and does not want to go through the court process. Agreed to the plan. Case discussed with staff in treatment team this morning. More than 16 mins of the session was spent doing Supportive psychotherapy. Session started at 11:30am and ended at 12pm.     Electronically signed by Eulalia Hoffman MD on 3/16/20 at 7:26 PM EDT    **This report has been created using voice recognition software. It may contain minor errors which are inherent in voice recognition technology. **

## 2020-03-16 NOTE — GROUP NOTE
Group Therapy Note    Date: 3/16/2020    Group Start Time: 1100  Group End Time: 1 Kole Dickersone  Group Topic: Healthy Living/Wellness    [facility]    Brandon Buckley LPN        Group Therapy Note    Attendees: 4         Notes:  Did not attend      Discipline Responsible: Licensed Practical Nurse      Signature:  Brandon Buckley LPN

## 2020-03-17 PROCEDURE — 6370000000 HC RX 637 (ALT 250 FOR IP): Performed by: PHYSICIAN ASSISTANT

## 2020-03-17 PROCEDURE — APPSS30 APP SPLIT SHARED TIME 16-30 MINUTES: Performed by: PHYSICIAN ASSISTANT

## 2020-03-17 PROCEDURE — 90833 PSYTX W PT W E/M 30 MIN: CPT | Performed by: PSYCHIATRY & NEUROLOGY

## 2020-03-17 PROCEDURE — 1240000000 HC EMOTIONAL WELLNESS R&B

## 2020-03-17 PROCEDURE — 99232 SBSQ HOSP IP/OBS MODERATE 35: CPT | Performed by: PSYCHIATRY & NEUROLOGY

## 2020-03-17 PROCEDURE — 6370000000 HC RX 637 (ALT 250 FOR IP): Performed by: PSYCHIATRY & NEUROLOGY

## 2020-03-17 RX ORDER — GABAPENTIN 600 MG/1
700 TABLET ORAL 3 TIMES DAILY
Status: DISCONTINUED | OUTPATIENT
Start: 2020-03-17 | End: 2020-03-17

## 2020-03-17 RX ADMIN — TRAMADOL HYDROCHLORIDE 50 MG: 50 TABLET, FILM COATED ORAL at 18:23

## 2020-03-17 RX ADMIN — TRAZODONE HYDROCHLORIDE 50 MG: 50 TABLET ORAL at 20:59

## 2020-03-17 RX ADMIN — ACETAMINOPHEN 650 MG: 325 TABLET ORAL at 02:43

## 2020-03-17 RX ADMIN — GABAPENTIN 700 MG: 400 CAPSULE ORAL at 13:25

## 2020-03-17 RX ADMIN — QUETIAPINE FUMARATE 300 MG: 100 TABLET ORAL at 20:57

## 2020-03-17 RX ADMIN — NORTRIPTYLINE HYDROCHLORIDE 75 MG: 25 CAPSULE ORAL at 20:57

## 2020-03-17 RX ADMIN — HYDROXYZINE HYDROCHLORIDE 50 MG: 25 TABLET ORAL at 11:23

## 2020-03-17 RX ADMIN — GABAPENTIN 700 MG: 400 CAPSULE ORAL at 20:57

## 2020-03-17 RX ADMIN — IBUPROFEN 400 MG: 200 TABLET, FILM COATED ORAL at 11:23

## 2020-03-17 RX ADMIN — GABAPENTIN 600 MG: 600 TABLET, FILM COATED ORAL at 08:08

## 2020-03-17 RX ADMIN — MULTIPLE VITAMINS W/ MINERALS TAB 1 TABLET: TAB at 08:08

## 2020-03-17 RX ADMIN — TRAMADOL HYDROCHLORIDE 50 MG: 50 TABLET, FILM COATED ORAL at 05:57

## 2020-03-17 RX ADMIN — ACETAMINOPHEN 650 MG: 325 TABLET ORAL at 08:10

## 2020-03-17 ASSESSMENT — PAIN DESCRIPTION - PROGRESSION
CLINICAL_PROGRESSION: NOT CHANGED
CLINICAL_PROGRESSION: GRADUALLY IMPROVING

## 2020-03-17 ASSESSMENT — PAIN DESCRIPTION - ONSET
ONSET: AWAKENED FROM SLEEP
ONSET: ON-GOING
ONSET: ON-GOING

## 2020-03-17 ASSESSMENT — PAIN DESCRIPTION - DIRECTION: RADIATING_TOWARDS: DOWN BILATERAL LEGS

## 2020-03-17 ASSESSMENT — PAIN DESCRIPTION - PAIN TYPE
TYPE: CHRONIC PAIN

## 2020-03-17 ASSESSMENT — PAIN DESCRIPTION - FREQUENCY
FREQUENCY: CONTINUOUS
FREQUENCY: INTERMITTENT
FREQUENCY: INTERMITTENT
FREQUENCY: CONTINUOUS

## 2020-03-17 ASSESSMENT — PAIN DESCRIPTION - LOCATION
LOCATION: BACK

## 2020-03-17 ASSESSMENT — PAIN SCALES - GENERAL
PAINLEVEL_OUTOF10: 0
PAINLEVEL_OUTOF10: 6
PAINLEVEL_OUTOF10: 9
PAINLEVEL_OUTOF10: 7
PAINLEVEL_OUTOF10: 8
PAINLEVEL_OUTOF10: 7
PAINLEVEL_OUTOF10: 0
PAINLEVEL_OUTOF10: 6
PAINLEVEL_OUTOF10: 8

## 2020-03-17 ASSESSMENT — PAIN DESCRIPTION - ORIENTATION
ORIENTATION: LOWER
ORIENTATION: MID
ORIENTATION: LOWER
ORIENTATION: LOWER

## 2020-03-17 ASSESSMENT — PAIN DESCRIPTION - DESCRIPTORS
DESCRIPTORS: ACHING
DESCRIPTORS: BURNING;SHOOTING
DESCRIPTORS: ACHING
DESCRIPTORS: ACHING;DISCOMFORT

## 2020-03-17 ASSESSMENT — PAIN - FUNCTIONAL ASSESSMENT
PAIN_FUNCTIONAL_ASSESSMENT: PREVENTS OR INTERFERES SOME ACTIVE ACTIVITIES AND ADLS
PAIN_FUNCTIONAL_ASSESSMENT: ACTIVITIES ARE NOT PREVENTED

## 2020-03-17 NOTE — PLAN OF CARE
Problem: Altered Mood, Deterioration in Function:  Goal: Ability to perform activities of daily living will improve  Description: Ability to perform activities of daily living will improve  3/17/2020 0053 by Jeferson Mariscal LPN  Outcome: Met This Shift  Note: Pt is able to perform ADL's independently   3/16/2020 1409 by Sana Mccormick LPN  Outcome: Ongoing  Note: Patient performs adl's  Goal: Able to verbalize reality based thinking  Description: Able to verbalize reality based thinking  3/17/2020 0053 by Jeferson Mariscal LPN  Outcome: Met This Shift  Note: A&O X4   3/16/2020 1409 by Sana Mccormick LPN  Outcome: Met This Shift  Note: Patient oriented X4, verbalizes reality based thinking  Goal: Maintenance of adequate nutrition will improve  Description: Maintenance of adequate nutrition will improve  3/17/2020 0053 by Jeferson Mariscal LPN  Outcome: Met This Shift  Note: Pt achieving adequate nutrition   3/16/2020 1409 by Sana Mccormick LPN  Outcome: Met This Shift  Note: Adequate nutrition  Goal: Participates in care planning  Description: Participates in care planning  3/17/2020 0053 by Jeferson Mariscal LPN  Outcome: Met This Shift  Note: Pt does participate in care planning   3/16/2020 1409 by Sana Mccormick LPN  Outcome: Ongoing  Note: Patient compliant with meds, encouragement given to attend groups     Problem: Altered Mood, Psychotic Behavior:  Goal: Able to demonstrate trust by eating, participating in treatment and following staff's direction  Description: Able to demonstrate trust by eating, participating in treatment and following staff's direction  3/17/2020 0053 by Jeferson Mariscal LPN  Outcome: Met This Shift  Note: Pt is able to demonstrate trust by eating, participating in tx and following staff direction   3/16/2020 1409 by Sana Mccormick LPN  Outcome: Ongoing  Note: Patient eating well, follow's directions, encouragement is given to attend groups  Goal: Compliance with prescribed medication regimen will improve  Description: Compliance with prescribed medication regimen will improve  3/17/2020 0053 by Dipti Garcia LPN  Outcome: Met This Shift  Note: Pt is compliant with prescribed medication regimen   3/16/2020 1409 by Umang Tran LPN  Outcome: Met This Shift  Note: Compliant with medications     Problem: Altered Mood, Depressive Behavior:  Goal: Participates in care planning  Description: Participates in care planning  3/17/2020 0053 by Dipti Garcia LPN  Outcome: Met This Shift  Note: Pt does participate in care planning   3/16/2020 1409 by Umang Tran LPN  Outcome: Ongoing  Note: Patient compliant with meds, encouragement given to attend groups  Goal: Able to verbalize and/or display a decrease in depressive symptoms  Description: Able to verbalize and/or display a decrease in depressive symptoms  3/17/2020 0053 by Dipti Garcia LPN  Outcome: Met This Shift  Note: Pt is able to verbalize a decrease in depressive symptoms this shift   3/16/2020 1409 by Umang Tran LPN  Outcome: Ongoing  Note: Patient verbalizes having depressive symptoms, has blunt, sad affect, rates mood #4, will continue to monitor  Goal: Absence of self-harm  Description: Absence of self-harm  3/17/2020 0053 by Dipti Garcia LPN  Outcome: Met This Shift  Note: Pt free of self harm behaviors this shift   3/16/2020 1409 by Umang Tran LPN  Outcome: Met This Shift  Note: No self harm     Problem: Pain:  Goal: Pain level will decrease  Description: Pain level will decrease  3/17/2020 0053 by Dipti Garcia LPN  Outcome: Met This Shift  Note: Pain level decreases with prn pain med   3/16/2020 1409 by Umang Tran LPN  Outcome: Ongoing  Note: Medication given for pain, see MAR     Problem: Skin Integrity:  Goal: Will show no infection signs and symptoms  Description: Will show no infection signs and symptoms  3/17/2020 0053 by Jinny Kerr LPN  Outcome: Met This Shift  Note: Free of s/s of infection   3/16/2020 1409 by Imani Agudelo LPN  Outcome: Ongoing  Note: No signs or symptoms of infection, will continue to monitor     Problem: Altered Mood, Psychotic Behavior:  Goal: Ability to interact with others will improve  Description: Ability to interact with others will improve  3/17/2020 0053 by Jinny Kerr LPN  Outcome: Ongoing  Note: Isolative to room, does not interact with peers   3/16/2020 1409 by Imani Agudelo LPN  Outcome: Ongoing  Note: Patient isolates to room, does not interact with peers     Problem: Discharge Planning:  Goal: Discharged to appropriate level of care  Description: Discharged to appropriate level of care  3/17/2020 0053 by Jinny Kerr LPN  Outcome: Ongoing  Note: D/C planning in process   3/16/2020 1409 by Imani Agudelo LPN  Outcome: Ongoing  Note: Discharge planning in progress     Problem: Anxiety:  Goal: Level of anxiety will decrease  Description: Level of anxiety will decrease  3/17/2020 0053 by Jinny Kerr LPN  Outcome: Ongoing  3/16/2020 1409 by Imani Agudelo LPN  Outcome: Ongoing  Note: Medication given for anxiety, see MAR     Problem: KNOWLEDGE DEFICIT,EDUCATION,DISCHARGE PLAN  Goal: Knowledge - personal safety  3/17/2020 0053 by Jinny Kerr LPN  Outcome: Ongoing  Note: Pt encouraged to complete personal safety plan prior to D/C   3/16/2020 1409 by Imani Agudelo LPN  Outcome: Ongoing  Note: Safety plan yet to be completed     Problem: Social interaction  Goal: Increased social interaction  3/16/2020 1357 by Brooks Au  Outcome: Not Met This Shift   Care plan reviewed with patient. Patient does verbalize understanding of the plan of care and does contribute to goal setting.

## 2020-03-17 NOTE — GROUP NOTE
Group Therapy Note    Date: 3/17/2020    Group Start Time: 1100  Group End Time: 3290  Group Topic: Healthy Living/Wellness    [facility]    Elana Ratliff RN        Group Therapy Note    Attendees: 7         Patient did not attend group.

## 2020-03-17 NOTE — PLAN OF CARE
Problem: Anxiety:  Goal: Level of anxiety will decrease  Description: Level of anxiety will decrease  3/17/2020 1041 by Caroline Topete RN  Outcome: Met This Shift  Note: Pt denied any anxiety  3/17/2020 0053 by Dipti Garcia LPN  Outcome: Ongoing     Problem: Skin Integrity:  Goal: Will show no infection signs and symptoms  Description: Will show no infection signs and symptoms  3/17/2020 1041 by Caroline Topete RN  Outcome: Met This Shift  3/17/2020 0053 by Dipti Garcia LPN  Outcome: Met This Shift  Note: Free of s/s of infection      Problem: Altered Mood, Deterioration in Function:  Goal: Able to verbalize reality based thinking  Description: Able to verbalize reality based thinking  3/17/2020 1041 by Caroline Topete RN  Outcome: Ongoing  Note: Pt orientated to person, place . Pt thinks that she is here because of back pain and needs back surgery  3/17/2020 0053 by Dipti Garcia LPN  Outcome: Met This Shift  Note: A&O X4   Goal: Participates in care planning  Description: Participates in care planning  3/17/2020 1041 by Caroline Topete RN  Outcome: Ongoing  Note: Patient discussed treatment plan with physician/medical staff, not attending group, and compliant with medications. 3/17/2020 0053 by Dipti Garcia LPN  Outcome: Met This Shift  Note: Pt does participate in care planning      Problem: Discharge Planning:  Goal: Discharged to appropriate level of care  Description: Discharged to appropriate level of care  3/17/2020 1041 by Caroline Topete RN  Outcome: Ongoing  Note: Patient voices physical needs before discharge. Patient plans to be discharged to unknown location. Elder Marrero Discharge planner working with patient to achieve optimal discharge plans, specific to individual needs.    3/17/2020 0053 by Dipti Garcia LPN  Outcome: Ongoing  Note: D/C planning in process      Problem: KNOWLEDGE DEFICIT,EDUCATION,DISCHARGE PLAN  Goal: Knowledge - personal safety  3/17/2020 1041 by Lorrie Bailey RN  Outcome: Ongoing  3/17/2020 0053 by Vera Del Rio LPN  Outcome: Ongoing  Note: Pt encouraged to complete personal safety plan prior to D/C      Problem: Altered Mood, Depressive Behavior:  Goal: Participates in care planning  Description: Participates in care planning  3/17/2020 1041 by Lorrie Bailey RN  Outcome: Ongoing  Note: Patient discussed treatment plan with physician/medical staff, not attending group, and compliant with medications. 3/17/2020 0053 by Vera Del Rio LPN  Outcome: Met This Shift  Note: Pt does participate in care planning   Goal: Able to verbalize and/or display a decrease in depressive symptoms  Description: Able to verbalize and/or display a decrease in depressive symptoms  3/17/2020 1041 by Lorrie Bailey RN  Outcome: Ongoing  Note: Pt irritable . Pt denied any SI. Pt dismissive of staff and was upset that staff wont take her downstairs for surgery.  Pt also asking for Vicodin   3/17/2020 0053 by Vera Del Rio LPN  Outcome: Met This Shift  Note: Pt is able to verbalize a decrease in depressive symptoms this shift   Goal: Absence of self-harm  Description: Absence of self-harm  3/17/2020 1041 by Lorrie Bailey RN  Outcome: Ongoing  3/17/2020 0053 by Vera Del Rio LPN  Outcome: Met This Shift  Note: Pt free of self harm behaviors this shift      Problem: Altered Mood, Psychotic Behavior:  Goal: Ability to interact with others will improve  Description: Ability to interact with others will improve  3/17/2020 1041 by Lorrie Bailey RN  Outcome: Not Met This Shift  Note: Pt isolated to room and not attending groups   3/17/2020 0053 by Vera Del Rio LPN  Outcome: Ongoing  Note: Isolative to room, does not interact with peers      Problem: Pain:  Goal: Pain level will decrease  Description: Pain level will decrease  3/17/2020 1041 by Lorrie Bailey RN  Outcome: Not Met This Shift  Note:

## 2020-03-18 PROCEDURE — 6370000000 HC RX 637 (ALT 250 FOR IP): Performed by: PSYCHIATRY & NEUROLOGY

## 2020-03-18 PROCEDURE — APPSS30 APP SPLIT SHARED TIME 16-30 MINUTES: Performed by: PHYSICIAN ASSISTANT

## 2020-03-18 PROCEDURE — 1240000000 HC EMOTIONAL WELLNESS R&B

## 2020-03-18 PROCEDURE — 6370000000 HC RX 637 (ALT 250 FOR IP): Performed by: PHYSICIAN ASSISTANT

## 2020-03-18 PROCEDURE — 99233 SBSQ HOSP IP/OBS HIGH 50: CPT | Performed by: PSYCHIATRY & NEUROLOGY

## 2020-03-18 PROCEDURE — 90833 PSYTX W PT W E/M 30 MIN: CPT | Performed by: PSYCHIATRY & NEUROLOGY

## 2020-03-18 RX ORDER — GABAPENTIN 400 MG/1
800 CAPSULE ORAL 3 TIMES DAILY
Status: DISCONTINUED | OUTPATIENT
Start: 2020-03-18 | End: 2020-03-19 | Stop reason: HOSPADM

## 2020-03-18 RX ORDER — HYDROXYZINE HYDROCHLORIDE 25 MG/1
50 TABLET, FILM COATED ORAL EVERY 4 HOURS PRN
Status: DISCONTINUED | OUTPATIENT
Start: 2020-03-18 | End: 2020-03-19 | Stop reason: HOSPADM

## 2020-03-18 RX ORDER — HALOPERIDOL 5 MG/ML
5 INJECTION INTRAMUSCULAR EVERY 8 HOURS PRN
Status: DISCONTINUED | OUTPATIENT
Start: 2020-03-18 | End: 2020-03-19 | Stop reason: HOSPADM

## 2020-03-18 RX ORDER — HALOPERIDOL 5 MG
5 TABLET ORAL PRN
Status: DISCONTINUED | OUTPATIENT
Start: 2020-03-18 | End: 2020-03-18

## 2020-03-18 RX ORDER — HALOPERIDOL 5 MG
5 TABLET ORAL EVERY 8 HOURS PRN
Status: DISCONTINUED | OUTPATIENT
Start: 2020-03-18 | End: 2020-03-19 | Stop reason: HOSPADM

## 2020-03-18 RX ORDER — HALOPERIDOL 5 MG/ML
5 INJECTION INTRAMUSCULAR EVERY 8 HOURS PRN
Status: DISCONTINUED | OUTPATIENT
Start: 2020-03-18 | End: 2020-03-18

## 2020-03-18 RX ORDER — HALOPERIDOL 5 MG
5 TABLET ORAL EVERY 6 HOURS PRN
Status: DISCONTINUED | OUTPATIENT
Start: 2020-03-18 | End: 2020-03-18

## 2020-03-18 RX ADMIN — GABAPENTIN 800 MG: 400 CAPSULE ORAL at 13:04

## 2020-03-18 RX ADMIN — TRAMADOL HYDROCHLORIDE 50 MG: 50 TABLET, FILM COATED ORAL at 20:19

## 2020-03-18 RX ADMIN — MULTIPLE VITAMINS W/ MINERALS TAB 1 TABLET: TAB at 08:27

## 2020-03-18 RX ADMIN — TRAZODONE HYDROCHLORIDE 50 MG: 50 TABLET ORAL at 20:19

## 2020-03-18 RX ADMIN — QUETIAPINE FUMARATE 300 MG: 100 TABLET ORAL at 20:19

## 2020-03-18 RX ADMIN — NORTRIPTYLINE HYDROCHLORIDE 75 MG: 25 CAPSULE ORAL at 20:19

## 2020-03-18 RX ADMIN — HYDROXYZINE HYDROCHLORIDE 50 MG: 25 TABLET ORAL at 08:27

## 2020-03-18 RX ADMIN — HALOPERIDOL 5 MG: 5 TABLET ORAL at 11:53

## 2020-03-18 RX ADMIN — ACETAMINOPHEN 650 MG: 325 TABLET ORAL at 13:39

## 2020-03-18 RX ADMIN — GABAPENTIN 800 MG: 400 CAPSULE ORAL at 20:19

## 2020-03-18 RX ADMIN — TRAMADOL HYDROCHLORIDE 50 MG: 50 TABLET, FILM COATED ORAL at 00:46

## 2020-03-18 RX ADMIN — GABAPENTIN 700 MG: 400 CAPSULE ORAL at 08:27

## 2020-03-18 RX ADMIN — IBUPROFEN 400 MG: 200 TABLET, FILM COATED ORAL at 18:10

## 2020-03-18 RX ADMIN — HYDROXYZINE HYDROCHLORIDE 50 MG: 25 TABLET ORAL at 00:45

## 2020-03-18 RX ADMIN — IBUPROFEN 400 MG: 200 TABLET, FILM COATED ORAL at 05:40

## 2020-03-18 ASSESSMENT — PAIN DESCRIPTION - FREQUENCY
FREQUENCY: INTERMITTENT
FREQUENCY: CONTINUOUS

## 2020-03-18 ASSESSMENT — PAIN DESCRIPTION - ONSET
ONSET: ON-GOING
ONSET: ON-GOING

## 2020-03-18 ASSESSMENT — PAIN DESCRIPTION - PROGRESSION
CLINICAL_PROGRESSION: NOT CHANGED
CLINICAL_PROGRESSION: NOT CHANGED

## 2020-03-18 ASSESSMENT — PAIN DESCRIPTION - DESCRIPTORS
DESCRIPTORS: SHARP;SHOOTING
DESCRIPTORS: ACHING

## 2020-03-18 ASSESSMENT — PAIN DESCRIPTION - PAIN TYPE
TYPE: CHRONIC PAIN
TYPE: CHRONIC PAIN

## 2020-03-18 ASSESSMENT — PAIN SCALES - GENERAL
PAINLEVEL_OUTOF10: 10
PAINLEVEL_OUTOF10: 0
PAINLEVEL_OUTOF10: 8
PAINLEVEL_OUTOF10: 7
PAINLEVEL_OUTOF10: 0
PAINLEVEL_OUTOF10: 8
PAINLEVEL_OUTOF10: 8
PAINLEVEL_OUTOF10: 0
PAINLEVEL_OUTOF10: 10

## 2020-03-18 ASSESSMENT — PAIN DESCRIPTION - ORIENTATION
ORIENTATION: LOWER
ORIENTATION: LOWER

## 2020-03-18 ASSESSMENT — PAIN DESCRIPTION - LOCATION
LOCATION: BACK
LOCATION: BACK

## 2020-03-18 ASSESSMENT — PAIN DESCRIPTION - DIRECTION
RADIATING_TOWARDS: DOWN BILATERAL LEGS
RADIATING_TOWARDS: DOWN BILATERAL LEGS

## 2020-03-18 NOTE — PLAN OF CARE
Behavior:  Goal: Ability to interact with others will improve  Description: Ability to interact with others will improve  3/18/2020 1158 by Dheeraj Burch RN  Outcome: Ongoing  Note: Patient was intrusive with her roommate during her roommates assessment. Patient crying and irritable with others throughout the shift. 3/18/2020 0011 by Uri Horvath RN  Outcome: Ongoing  Note: Patient was isolative to her room with no peer interaction noted. Problem: Discharge Planning:  Goal: Discharged to appropriate level of care  Description: Discharged to appropriate level of care  3/18/2020 1158 by Dheeraj Burch RN  Outcome: Ongoing  Note: Discharge plan unknown at present  Pt plans to return home with her sister. 3/18/2020 0011 by Uri Horvath RN  Outcome: Ongoing  Note: Patient states she is hopeful to [address] at discharge. Problem: Anxiety:  Goal: Level of anxiety will decrease  Description: Level of anxiety will decrease  3/18/2020 1158 by Dheeraj Burch RN  Outcome: Ongoing  Note: Patient reports feelings anxiety. Pt taking Atarax prn. Verbalized medication was effective. 3/18/2020 0011 by Uri Horvath RN  Outcome: Ongoing  Note: Patient states she continues to feel moderately anxious this shift. Problem: Altered Mood, Depressive Behavior:  Goal: Participates in care planning  Description: Participates in care planning  3/18/2020 1158 by Dheeraj Burch RN  Outcome: Ongoing  Note: Care plan reviewed with patient. Patient unable to verbalize understanding of the plan of care but does contribute to goal setting. 3/18/2020 0011 by Uri Horvath RN  Outcome: Ongoing  Note: Care plan reviewed with patient and fair understanding verbalized.   Goal: Able to verbalize and/or display a decrease in depressive symptoms  Description: Participates in care planning  3/18/2020 1158 by Dheeraj Burch RN  Outcome: Ongoing  Note: Depressed mood and affect  States anxious and that would help her back\". 3/18/2020 0011 by Arnel Black RN  Outcome: Ongoing  Note: Patient states she continues with low back pain of a 6.

## 2020-03-18 NOTE — PROGRESS NOTES
Group Therapy Note    Date: 3/18/2020  Start Time: 1030  End Time:  1100      Type of Group: Community Meeting      Patient's Goal:  Patient goal \"get back surgery\". Status After Intervention:  Unchanged    Participation Level: Active Listener    Participation Quality: Appropriate      Speech:  normal      Thought Process/Content: Logical      Affective Functioning: Congruent      Mood: depressed      Level of consciousness:  Alert      Response to Learning: Able to verbalize current knowledge/experience      Endings: None Reported    Modes of Intervention: Education      Discipline Responsible: Licensed Practical Nurse      Signature:  Silvana Navarro.  Chayo Escalera LPN

## 2020-03-18 NOTE — PROGRESS NOTES
Discharge Planning 1321-I telephoned the [address] [place] [address] as requested by Dr. Billy Dong. Pt follows with Arabella Molina. [facility] has agreed to continue pt current medications.

## 2020-03-18 NOTE — PLAN OF CARE
Problem: Altered Mood, Deterioration in Function:  Goal: Ability to perform activities of daily living will improve  Description: Ability to perform activities of daily living will improve  3/17/2020 1041 by Michael Lassiter RN  Outcome: Completed  Note: Pt able to do own ADL's   Goal: Able to verbalize reality based thinking  Description: Able to verbalize reality based thinking  3/18/2020 0011 by Ashley Monsivais RN  Outcome: Ongoing  Note: Patient is alert and oriented times 4.  3/17/2020 1041 by Michael Lassiter RN  Outcome: Ongoing  Note: Pt orientated to person, place . Pt thinks that she is here because of back pain and needs back surgery  Goal: Maintenance of adequate nutrition will improve  Description: Maintenance of adequate nutrition will improve  3/17/2020 1041 by Michael Lassiter RN  Outcome: Completed  Goal: Participates in care planning  Description: Participates in care planning  3/18/2020 0011 by Ashley Monsivais RN  Outcome: Ongoing  Note: Care plan reviewed with patient and fair understanding verbalized. 3/17/2020 1041 by Michael Lassiter RN  Outcome: Ongoing  Note: Patient discussed treatment plan with physician/medical staff, not attending group, and compliant with medications. Problem: Altered Mood, Psychotic Behavior:  Goal: Able to demonstrate trust by eating, participating in treatment and following staff's direction  Description: Able to demonstrate trust by eating, participating in treatment and following staff's direction  3/17/2020 1041 by Michael Lassiter RN  Outcome: Completed  Goal: Ability to interact with others will improve  Description: Ability to interact with others will improve  3/18/2020 0011 by Ashley Monsivais RN  Outcome: Ongoing  Note: Patient was isolative to her room with no peer interaction noted.   3/17/2020 1041 by Michael Lassiter RN  Outcome: Not Met This Shift  Note: Pt isolated to room and not attending groups   Goal: Compliance with prescribed anxiety will decrease  Description: Level of anxiety will decrease  3/18/2020 0011 by Yoselyn Stern RN  Outcome: Ongoing  Note: Patient states she continues to feel moderately anxious this shift. 3/17/2020 1041 by Maral Jack RN  Outcome: Met This Shift  Note: Pt denied any anxiety     Problem: Pain:  Goal: Pain level will decrease  Description: Pain level will decrease  3/18/2020 0011 by Yoselyn Stern RN  Outcome: Ongoing  Note: Patient states she continues with low back pain of a 6.  3/17/2020 1041 by Maral Jack RN  Outcome: Not Met This Shift  Note: Patient reports continued pain 9 out of 10. Patient taking Neurontin, tylenol and Motrin for pain. Patient reports continued pain after taking pain medications. Pt wants Vicodin , Pain goal of 0 was not met. Non-pharmacological interventions offered were rest.      Problem: Skin Integrity:  Goal: Will show no infection signs and symptoms  Description: Will show no infection signs and symptoms  3/18/2020 0011 by Yoselyn Stern RN  Outcome: Ongoing  Note: None noted so far this shift. 3/17/2020 1041 by Maral Jack RN  Outcome: Met This Shift     Problem: KNOWLEDGE DEFICIT,EDUCATION,DISCHARGE PLAN  Goal: Knowledge - personal safety  3/18/2020 0011 by Yoselyn Stern RN  Outcome: Ongoing  Note: Patient maintained in a safe environment. 15 minute visual checks continued. 3/17/2020 1041 by Maral Jack RN  Outcome: Ongoing   Care plan reviewed with patient.   Patient does verbalize understanding of the plan of care and does contribute to goal setting

## 2020-03-18 NOTE — PROGRESS NOTES
is 290 lb (131.5 kg). Her tympanic temperature is 97.5 °F (36.4 °C). Her blood pressure is 126/71 and her pulse is 94. Her respiration is 18 and oxygen saturation is 95%. Lab Results   Component Value Date    WBC 8.5 03/11/2020    HGB 12.8 03/11/2020    HCT 38.6 03/11/2020     03/11/2020    ALT 32 03/11/2020    AST 23 03/11/2020     03/11/2020    K 3.5 03/11/2020     03/11/2020    CREATININE 1.0 03/11/2020    BUN 12 03/11/2020    CO2 20 (L) 03/11/2020    TSH 3.570 01/01/2020    INR 0.91 02/11/2020          Mental Status Examination:   Level of consciousness:  Within normal limits  Appearance: laying in hospital bed, poor grooming  Behavior/Motor: psychomotor retardation  Gait: no abnormalities noted. Attitude toward examiner: Poor eye contact, semicooperative  Speech: Normal volume  Mood: labile  Affect:  Reactive  Thought processes: goal directed  Thought content: somatically preoccupied    Suicidal ideation:  passive                           Homicidal ideations: denies                            Hallucinations: denies                           Delusions: denies  Cognition: oriented to self and location only  Memory: improving  Insight and Judgement are improving  Attention and concentration are limited      ASSESSMENT     Schizoaffective disorder (HCC)   Substance use disorder; opiates, amphetamines, hallucinogens, inhalants    PLAN    Patient's condition is slowly improving  Medication adjustments: see orders  Encouraged patient to participate in discharge planning  Attempt to develop insight, psycho-education and supportive therapy conducted. Possible discharge: TBD  Follow-up: reestablish with [facility]     Electronically signed by Tory Gilliam PA-C on 3/18/2020 at 12:36 PM Reviewed patient's current plan of care and vital signs with nursing staff.                                            Psychiatry Attending Attestation     I assessed this patient and reviewed the case and

## 2020-03-19 VITALS
OXYGEN SATURATION: 97 % | HEIGHT: 68 IN | BODY MASS INDEX: 43.95 KG/M2 | RESPIRATION RATE: 16 BRPM | WEIGHT: 290 LBS | DIASTOLIC BLOOD PRESSURE: 79 MMHG | SYSTOLIC BLOOD PRESSURE: 128 MMHG | TEMPERATURE: 97 F | HEART RATE: 87 BPM

## 2020-03-19 PROCEDURE — 6370000000 HC RX 637 (ALT 250 FOR IP): Performed by: PHYSICIAN ASSISTANT

## 2020-03-19 PROCEDURE — 99239 HOSP IP/OBS DSCHRG MGMT >30: CPT | Performed by: PSYCHIATRY & NEUROLOGY

## 2020-03-19 PROCEDURE — 5130000000 HC BRIDGE APPOINTMENT

## 2020-03-19 PROCEDURE — 6370000000 HC RX 637 (ALT 250 FOR IP): Performed by: PSYCHIATRY & NEUROLOGY

## 2020-03-19 RX ORDER — QUETIAPINE FUMARATE 50 MG/1
50 TABLET, FILM COATED ORAL DAILY
Qty: 60 TABLET | Refills: 3 | Status: CANCELLED | OUTPATIENT
Start: 2020-03-20

## 2020-03-19 RX ORDER — QUETIAPINE FUMARATE 25 MG/1
50 TABLET, FILM COATED ORAL DAILY
Status: DISCONTINUED | OUTPATIENT
Start: 2020-03-20 | End: 2020-03-19

## 2020-03-19 RX ORDER — NORTRIPTYLINE HYDROCHLORIDE 75 MG/1
75 CAPSULE ORAL NIGHTLY
Qty: 30 CAPSULE | Refills: 0 | Status: SHIPPED | OUTPATIENT
Start: 2020-03-19

## 2020-03-19 RX ORDER — HYDROXYZINE 50 MG/1
50 TABLET, FILM COATED ORAL EVERY 4 HOURS PRN
Qty: 60 TABLET | Refills: 0 | Status: SHIPPED | OUTPATIENT
Start: 2020-03-19 | End: 2020-04-03

## 2020-03-19 RX ORDER — QUETIAPINE FUMARATE 25 MG/1
50 TABLET, FILM COATED ORAL DAILY
Status: DISCONTINUED | OUTPATIENT
Start: 2020-03-19 | End: 2020-03-19 | Stop reason: HOSPADM

## 2020-03-19 RX ORDER — GABAPENTIN 400 MG/1
800 CAPSULE ORAL 3 TIMES DAILY
Qty: 90 CAPSULE | Refills: 0 | Status: SHIPPED | OUTPATIENT
Start: 2020-03-19 | End: 2020-07-02

## 2020-03-19 RX ORDER — TRAZODONE HYDROCHLORIDE 50 MG/1
50 TABLET ORAL NIGHTLY PRN
Qty: 30 TABLET | Refills: 0 | Status: SHIPPED | OUTPATIENT
Start: 2020-03-19

## 2020-03-19 RX ORDER — QUETIAPINE FUMARATE 50 MG/1
50 TABLET, FILM COATED ORAL DAILY
Qty: 30 TABLET | Refills: 0 | Status: SHIPPED | OUTPATIENT
Start: 2020-03-19

## 2020-03-19 RX ORDER — QUETIAPINE FUMARATE 300 MG/1
300 TABLET, FILM COATED ORAL NIGHTLY
Qty: 30 TABLET | Refills: 0 | Status: SHIPPED | OUTPATIENT
Start: 2020-03-19

## 2020-03-19 RX ADMIN — ACETAMINOPHEN 650 MG: 325 TABLET ORAL at 09:04

## 2020-03-19 RX ADMIN — TRAMADOL HYDROCHLORIDE 50 MG: 50 TABLET, FILM COATED ORAL at 07:26

## 2020-03-19 RX ADMIN — MULTIPLE VITAMINS W/ MINERALS TAB 1 TABLET: TAB at 09:03

## 2020-03-19 RX ADMIN — QUETIAPINE FUMARATE 50 MG: 25 TABLET ORAL at 12:33

## 2020-03-19 RX ADMIN — HALOPERIDOL 5 MG: 5 TABLET ORAL at 09:03

## 2020-03-19 RX ADMIN — HYDROXYZINE HYDROCHLORIDE 50 MG: 25 TABLET ORAL at 11:38

## 2020-03-19 RX ADMIN — GABAPENTIN 800 MG: 400 CAPSULE ORAL at 09:03

## 2020-03-19 RX ADMIN — IBUPROFEN 400 MG: 200 TABLET, FILM COATED ORAL at 14:41

## 2020-03-19 RX ADMIN — IBUPROFEN 400 MG: 200 TABLET, FILM COATED ORAL at 03:53

## 2020-03-19 RX ADMIN — GABAPENTIN 800 MG: 400 CAPSULE ORAL at 14:40

## 2020-03-19 ASSESSMENT — PAIN DESCRIPTION - ONSET
ONSET: ON-GOING
ONSET: AWAKENED FROM SLEEP
ONSET: ON-GOING
ONSET: ON-GOING

## 2020-03-19 ASSESSMENT — PAIN DESCRIPTION - PROGRESSION
CLINICAL_PROGRESSION: NOT CHANGED

## 2020-03-19 ASSESSMENT — PAIN DESCRIPTION - DIRECTION
RADIATING_TOWARDS: DOWN BOTH LEGS
RADIATING_TOWARDS: DOWN BILATERAL LEGS

## 2020-03-19 ASSESSMENT — PAIN DESCRIPTION - PAIN TYPE
TYPE: CHRONIC PAIN

## 2020-03-19 ASSESSMENT — PAIN DESCRIPTION - FREQUENCY
FREQUENCY: INTERMITTENT

## 2020-03-19 ASSESSMENT — PAIN SCALES - GENERAL
PAINLEVEL_OUTOF10: 5
PAINLEVEL_OUTOF10: 7
PAINLEVEL_OUTOF10: 2
PAINLEVEL_OUTOF10: 6
PAINLEVEL_OUTOF10: 8
PAINLEVEL_OUTOF10: 0
PAINLEVEL_OUTOF10: 6

## 2020-03-19 ASSESSMENT — PAIN DESCRIPTION - DESCRIPTORS
DESCRIPTORS: ACHING;CONSTANT;DISCOMFORT
DESCRIPTORS: SHARP
DESCRIPTORS: ACHING;CONSTANT;DISCOMFORT
DESCRIPTORS: ACHING;CONSTANT

## 2020-03-19 ASSESSMENT — PAIN DESCRIPTION - ORIENTATION
ORIENTATION: LOWER

## 2020-03-19 ASSESSMENT — PAIN DESCRIPTION - LOCATION
LOCATION: BACK

## 2020-03-19 ASSESSMENT — PAIN - FUNCTIONAL ASSESSMENT
PAIN_FUNCTIONAL_ASSESSMENT: ACTIVITIES ARE NOT PREVENTED

## 2020-03-19 NOTE — DISCHARGE SUMMARY
unit, then saluted her RN and stated \"I'm in the [employer] now\". She also scratched her right wrist, c/o having a micro chip in her. My assessment was equally unsuccessful, as the majority of her responses were clang associations \"I die and fly into the [place]rienne I die and fly into the [place]rienne got a lose weight, go to lose weight, cannot lose weight, cannot lose weight, go to lose weight. It is all because not you not you not you Atlee Octave do not you\". Hospital Course:   Upon admission, Zi Fonseca was provided a safe secure environment, introduced to unit milieu. Patient participated in groups and individual therapies. Meds were adjusted as noted below. After few days of hospital care, patient began to feel improvement. Depression lifted, thoughts to harm self ceased. Sleep improved, appetite was good. On morning rounds 3/19/2020, Mervat SABA   endorses feeling ready for discharge. Patient denies suicidal or homicidal ideations, denies hallucinations or delusions. Denies SE's from meds. It was decided that maximum benefit from hospital care had been achieved and patient can be discharged. Consults:   None    Significant Diagnostic Studies: Routine labs and diagnostics    Treatments: Psychotropic medications, therapy with group, milieu, and 1:1 with nurses, social workers, PA-C/CNP, and Attending physician. Discharge Medications:  Current Discharge Medication List      START taking these medications    Details   gabapentin (NEURONTIN) 400 MG capsule Take 2 capsules by mouth 3 times daily for 15 days.   Qty: 90 capsule, Refills: 0      traZODone (DESYREL) 50 MG tablet Take 1 tablet by mouth nightly as needed for Sleep  Qty: 30 tablet, Refills: 0         CONTINUE these medications which have CHANGED    Details   hydrOXYzine (ATARAX) 50 MG tablet Take 1 tablet by mouth every 4 hours as needed for Itching  Qty: 60 tablet, Refills: 0      nortriptyline (PAMELOR) 75 MG capsule Take 1 capsule by mouth nightly  Qty: 30 capsule, Refills: 0      !! QUEtiapine (SEROQUEL) 300 MG tablet Take 1 tablet by mouth nightly  Qty: 30 tablet, Refills: 0      !! QUEtiapine (SEROQUEL) 50 MG tablet Take 1 tablet by mouth daily  Qty: 30 tablet, Refills: 0       !! - Potential duplicate medications found. Please discuss with provider. CONTINUE these medications which have NOT CHANGED    Details   Multiple Vitamins-Minerals (THERAPEUTIC MULTIVITAMIN-MINERALS) tablet Take 1 tablet by mouth daily adrenal support      albuterol sulfate  (90 Base) MCG/ACT inhaler Inhale 1 puff into the lungs every 6 hours as needed for Wheezing 12/03/19 medication list Pt has inhaler with her (locked in medication cabinet envelope # [id number])         STOP taking these medications       gabapentin (NEURONTIN) 600 MG tablet Comments:   Reason for Stopping:              Discharge Exam:  Level of consciousness:  Within normal limits  Appearance: Street clothes, seated, with good grooming  Behavior/Motor: No abnormalities noted  Attitude toward examiner:  Cooperative, attentive, good eye contact  Speech:  spontaneous, normal rate, normal volume and well articulated  Mood:  euthymic  Affect:  Full range  Thought processes:  linear, goal directed and coherent  Thought content:  denies homicidal ideation  Suicidal Ideation:  denies suicidal ideation  Delusions:  no evidence of delusions  Perceptual Disturbance:  denies any perceptual disturbance  Cognition:  Intact  Memory: age appropriate  Insight & Judgement: fair  Medication side effects: denies     Disposition: home    Patient Instructions: Activity: activity as tolerated  1. Patient instructed to take medications regularly and follow up with outpatient appointments.      Follow-up as scheduled with [facility]       Signed:    Electronically signed by Tam Carnes MD on 3/19/20 at 11:25 AM EDT    Time Spent on discharge is more than 35 minutes in the examination, evaluation, counseling and review of medications and discharge plan.

## 2020-03-19 NOTE — DISCHARGE INSTR - COC
M48.061    Schizoaffective disorder (HCC) F25.9    Delusions ([address] F22       Isolation/Infection:   Isolation          No Isolation        Patient Infection Status     None to display          Nurse Assessment:  Last Vital Signs: /79   Pulse 87   Temp 97 °F (36.1 °C) (Oral)   Resp 16   Ht 5' 8\" (1.727 m)   Wt 290 lb (131.5 kg)   SpO2 97%   BMI 44.09 kg/m²     Last documented pain score (0-10 scale): Pain Level: 5  Last Weight:   Wt Readings from Last 1 Encounters:   03/11/20 290 lb (131.5 kg)     Mental Status:  {IP PT MENTAL STATUS:20030}    IV Access:  { RC IV ACCESS:780237128}    Nursing Mobility/ADLs:  Walking   {P DME ITSF:604902919}  Transfer  {P DME VBNU:382300037}  Bathing  {[facility] DME YMGA:538816050}  Dressing  {P DME WVLI:364246404}  Toileting  {P DME GTLD:877431735}  Feeding  {P DME OVSV:455086100}  Med Admin  {[facility] DME HCEZ:915352835}  Med Delivery   { RC MED Delivery:155359013}    Wound Care Documentation and Therapy:        Elimination:  Continence:   · Bowel: {YES / XE:73553}  · Bladder: {YES / CY:14163}  Urinary Catheter: {Urinary Catheter:389765520}   Colostomy/Ileostomy/Ileal Conduit: {YES / HH:54575}       Date of Last BM: ***    Intake/Output Summary (Last 24 hours) at 3/19/2020 1501  Last data filed at 3/19/2020 0800  Gross per 24 hour   Intake 360 ml   Output --   Net 360 ml     I/O last 3 completed shifts:   In: 360 [P.O.:360]  Out: -     Safety Concerns:     [address] Concerns:910856746}    Impairments/Disabilities:      [address] Impairments/Disabilities:070914758}    Nutrition Therapy:  Current Nutrition Therapy:   [address] [employer] Diet List:062602828}    Routes of Feeding: {[facility] DME Other Feedings:620183825}  Liquids: {Slp liquid thickness:38978}  Daily Fluid Restriction: {CHP DME Yes amt example:127330964}  Last Modified Barium Swallow with Video (Video Swallowing Test): {Done Not Done [facility]:944457543}    Treatments at the Time of Hospital Discharge:   Respiratory

## 2020-03-19 NOTE — PLAN OF CARE
Problem: Altered Mood, Deterioration in Function:  Goal: Able to verbalize reality based thinking  Description: Able to verbalize reality based thinking  3/18/2020 2137 by Francisco Javier Harris RN  Outcome: Met This Shift  Note: No delusions voiced  3/18/2020 1158 by Josue Blanco RN  Outcome: Ongoing  Note: Patient admits to hearing whispers that sound tormenting but she cannot make out what they are saying. Goal: Participates in care planning  Description: Participates in care planning  3/18/2020 2137 by Francisco Javier Harris RN  Outcome: Met This Shift  Note: Care plan reviewed with patient. Patient does verbalize understanding of the plan of care and does contribute to goal setting     3/18/2020 1158 by Josue Blanco RN  Outcome: Ongoing  Note: Care plan reviewed with patient. Patient unable to verbalize understanding of the plan of care but does contribute to goal setting. Problem: Altered Mood, Depressive Behavior:  Goal: Participates in care planning  Description: Participates in care planning  3/18/2020 2137 by Francisco Javier Harris RN  Outcome: Met This Shift  Note: Care plan reviewed with patient. Patient does verbalize understanding of the plan of care and does contribute to goal setting     3/18/2020 1158 by Josue Blanco RN  Outcome: Ongoing  Note: Care plan reviewed with patient. Patient unable to verbalize understanding of the plan of care but does contribute to goal setting.      Goal: Absence of self-harm  Description: Absence of self-harm  3/18/2020 2137 by Francisco Javier Harris RN  Outcome: Met This Shift  Note: No self harm  3/18/2020 1158 by Josue Blanco RN  Outcome: Met This Shift  Note: No attempts to harm self this shift      Problem: Altered Mood, Psychotic Behavior:  Goal: Ability to interact with others will improve  Description: Ability to interact with others will improve  3/18/2020 2137 by Francisco Javier Harris RN  Outcome: Ongoing  Note: Pt had little peer interaction this poor eye contact, no hope for the future, crying \"why am I going through this\"  3/18/2020 1158 by Krissy Dietz RN  Outcome: Ongoing  Note: Depressed mood and affect  States his/her mood is sad and angry and states she still feels depressed. Problem: Pain:  Goal: Pain level will decrease  Description: Pain level will decrease  3/18/2020 2137 by Sindy Manriquez RN  Outcome: Ongoing  Note: Pt denied pain at first then later was crying and stated she was having a #10 pain in lower back, radiating down both legs, then said it doesn't hurt that it depends on her position and movement, had ultram  3/18/2020 1158 by Krissy Dietz RN  Outcome: Ongoing  Note: Patient reports lower back  pain 10 out of 10. Patient requested Atarax \"stating she was anxious and that would help her back\". Problem: Skin Integrity:  Goal: Will show no infection signs and symptoms  Description: Will show no infection signs and symptoms  3/18/2020 2137 by Sindy Manriquez RN  Outcome: Ongoing  Note: No signs or symptoms of infection noted  3/18/2020 1158 by Krissy Dietz RN  Outcome: Met This Shift  Note: No signs or symptoms of infection this shift.

## 2020-03-20 ENCOUNTER — TELEPHONE (OUTPATIENT)
Dept: PSYCHIATRY | Age: 60
End: 2020-03-20

## 2020-07-02 ENCOUNTER — HOSPITAL ENCOUNTER (EMERGENCY)
Age: 60
Discharge: NURSING, REHAB OR LONG-TERM CARE | End: 2020-07-02
Attending: EMERGENCY MEDICINE
Payer: MEDICARE

## 2020-07-02 VITALS
WEIGHT: 293 LBS | OXYGEN SATURATION: 98 % | BODY MASS INDEX: 44.41 KG/M2 | RESPIRATION RATE: 16 BRPM | TEMPERATURE: 97.7 F | HEIGHT: 68 IN | HEART RATE: 92 BPM | SYSTOLIC BLOOD PRESSURE: 134 MMHG | DIASTOLIC BLOOD PRESSURE: 88 MMHG

## 2020-07-02 PROCEDURE — 99284 EMERGENCY DEPT VISIT MOD MDM: CPT

## 2020-07-02 RX ORDER — ACETAMINOPHEN 325 MG/1
650 TABLET ORAL EVERY 6 HOURS PRN
COMMUNITY

## 2020-07-02 RX ORDER — CEPHALEXIN 500 MG/1
500 CAPSULE ORAL 3 TIMES DAILY
COMMUNITY

## 2020-07-02 RX ORDER — CYCLOBENZAPRINE HCL 10 MG
10 TABLET ORAL 3 TIMES DAILY PRN
COMMUNITY

## 2020-07-02 RX ORDER — DOXYCYCLINE HYCLATE 50 MG/1
324 CAPSULE, GELATIN COATED ORAL
COMMUNITY

## 2020-07-02 RX ORDER — FLUOXETINE HYDROCHLORIDE 20 MG/1
40 CAPSULE ORAL DAILY
COMMUNITY

## 2020-07-02 RX ORDER — DOCUSATE SODIUM 100 MG/1
100 CAPSULE, LIQUID FILLED ORAL 2 TIMES DAILY
COMMUNITY

## 2020-07-02 RX ORDER — ASCORBIC ACID 500 MG
500 TABLET ORAL DAILY
COMMUNITY

## 2020-07-02 RX ORDER — HYDROXYZINE PAMOATE 50 MG/1
50 CAPSULE ORAL 3 TIMES DAILY PRN
COMMUNITY

## 2020-07-02 RX ORDER — BISACODYL 10 MG
10 SUPPOSITORY, RECTAL RECTAL DAILY PRN
COMMUNITY

## 2020-07-02 ASSESSMENT — ENCOUNTER SYMPTOMS
COUGH: 0
SORE THROAT: 0
ABDOMINAL PAIN: 0

## 2020-07-02 ASSESSMENT — PAIN DESCRIPTION - PAIN TYPE: TYPE: CHRONIC PAIN;SURGICAL PAIN

## 2020-07-02 ASSESSMENT — PAIN DESCRIPTION - LOCATION: LOCATION: BACK

## 2020-07-02 ASSESSMENT — PAIN SCALES - GENERAL: PAINLEVEL_OUTOF10: 4

## 2020-07-02 NOTE — ED NOTES
Bed: 021A  Expected date:   Expected time:   Means of arrival: [address] [facility]  Comments:     Pawel Martínez RN  07/02/20 1041

## 2020-07-02 NOTE — ED PROVIDER NOTES
[facility]  eMERGENCY dEPARTMENT eNCOUnter          [address] [facility]       Chief Complaint   Patient presents with    Post-op Problem     dislodged FIDEL drain       Nurses Notes reviewed and I agree except as noted inthe HPI. HISTORY OF PRESENT ILLNESS    Lori Vazquez is a 64 y.o. female who presents to the Emergency Department for the evaluation of FIDEL drain displacement. Patient states she had back surgery on May 9. She had issues with abscess formation after and had incision and drainage performed at [facility] by her orthopedic surgeon. On 6/17 she had debridement of the area and placement of 2 FIDEL drains at that time. She states she has been doing well at the nursing home, receiving IV antibiotics with improving pain but while going to the restroom around 5 or 530 this morning noticed that the drain had come out when she otto from the commode. She states she was getting minimal output from the drain on the left, which is the one that is now removed. She denies any recent fevers, change in pain, new neurologic symptoms. No change in the placement of the right FIDEL drain. The HPI was provided by the patient. REVIEW OF SYSTEMS     Review of Systems   Constitutional: Negative for chills and fever. HENT: Negative for sore throat. Respiratory: Negative for cough. Cardiovascular: Negative for chest pain. Gastrointestinal: Negative for abdominal pain. Skin: Positive for wound. Neurological: Negative for weakness and headaches. PAST MEDICAL HISTORY    has a past medical history of Alcohol abuse, Anxiety, Chronic low back pain, Depression, Hepatitis C, Hypertension, MDD (major depressive disorder), recurrent severe, without psychosis ([address] Morbid obesity with BMI of 40.0-44.9, adult ([address] Schizoaffective disorder ([address] and Suicide attempt by polypharm overdose ([facility] [address] SURGICAL HISTORY      has a past surgical history that includes Tubal ligation; Tonsillectomy;  Ankle surgery; cyst removal; Pilonidal cyst excision; back surgery (nov 2013); and Dialysis fistula creation (Right, 2/11/2020). CURRENT MEDICATIONS       Previous Medications    ACETAMINOPHEN (TYLENOL) 325 MG TABLET    Take 650 mg by mouth every 6 hours as needed for Pain    ALBUTEROL SULFATE  (90 BASE) MCG/ACT INHALER    Inhale 1 puff into the lungs every 6 hours as needed for Wheezing 12/03/19 medication list Pt has inhaler with her (locked in medication cabinet envelope # [id number])    ALUMINUM-MAGNESIUM HYDROXIDE 200-200 MG/5ML SUSPENSION    Take by mouth as needed for Indigestion    ASCORBIC ACID (VITAMIN C) 500 MG TABLET    Take 500 mg by mouth daily    BISACODYL (DULCOLAX) 10 MG SUPPOSITORY    Place 10 mg rectally daily as needed for Constipation    CEPHALEXIN (KEFLEX) 500 MG CAPSULE    Take 500 mg by mouth 3 times daily    CYCLOBENZAPRINE (FLEXERIL) 10 MG TABLET    Take 10 mg by mouth 3 times daily as needed for Muscle spasms    DOCUSATE SODIUM (COLACE) 100 MG CAPSULE    Take 100 mg by mouth 2 times daily    ENOXAPARIN (LOVENOX) 100 MG/ML INJECTION    Inject into the skin 2 times daily    FERROUS GLUCONATE (FERGON) 324 (38 FE) MG TABLET    Take 324 mg by mouth daily (with breakfast)    FLUOXETINE (PROZAC) 20 MG CAPSULE    Take 40 mg by mouth daily    GABAPENTIN (NEURONTIN) 400 MG CAPSULE    Take 2 capsules by mouth 3 times daily for 15 days.     GLECAPREVIR-PIBRENTASVIR (MAVYRET PO)    Take by mouth    HYDROXYZINE (VISTARIL) 50 MG CAPSULE    Take 50 mg by mouth 3 times daily as needed for Itching    LURASIDONE (LATUDA) 40 MG TABS TABLET    Take 40 mg by mouth daily    MULTIPLE VITAMINS-MINERALS (THERAPEUTIC MULTIVITAMIN-MINERALS) TABLET    Take 1 tablet by mouth daily adrenal support    NORTRIPTYLINE (PAMELOR) 75 MG CAPSULE    Take 1 capsule by mouth nightly    QUETIAPINE (SEROQUEL) 300 MG TABLET    Take 1 tablet by mouth nightly    QUETIAPINE (SEROQUEL) 50 MG TABLET    Take 1 tablet by mouth daily TRAZODONE (DESYREL) 50 MG TABLET    Take 1 tablet by mouth nightly as needed for Sleep       ALLERGIES     is allergic to codeine and morphine. FAMILY HISTORY     She indicated that the status of her mother is unknown. She indicated that the status of her father is unknown. She indicated that her sister is alive. family history includes Diabetes in her mother; Heart Disease in her father; Mental Illness in her father. SOCIAL HISTORY      reports that she has been smoking cigarettes. She has a 0.25 pack-year smoking history. She has never used smokeless tobacco. She reports previous alcohol use of about 6.0 standard drinks of alcohol per week. She reports previous drug use. PHYSICAL EXAM     INITIAL VITALS:  height is 5' 8\" (1.727 m) and weight is 325 lb (147.4 kg) (abnormal). Her oral temperature is 97.7 °F (36.5 °C). Her blood pressure is 134/88 and her pulse is 92. Her respiration is 16 and oxygen saturation is 98%. Physical Exam  Vitals signs and nursing note reviewed. Constitutional:       Appearance: Normal appearance. HENT:      Head: Normocephalic and atraumatic. Eyes:      Conjunctiva/sclera: Conjunctivae normal.   Pulmonary:      Effort: Pulmonary effort is normal. No respiratory distress. Skin:     General: Skin is warm and dry. Comments: Well-healing midline lumbar wound with intact sutures. Intact right FIDEL drain with moderate amount of bright red bloody output. Intact suture to left lower back with absent IFDEL drain. Surgical wound from the drain without sign of acute injury. No contusion, hematoma, induration, fluctuance, erythema, tenderness, active bleeding   Neurological:      General: No focal deficit present. Mental Status: She is alert and oriented to person, place, and time. Psychiatric:         Mood and Affect: Mood normal.         Behavior: Behavior normal.         DIFFERENTIAL DIAGNOSIS:   Differential diagnoses are discussed    DIAGNOSTIC RESULTS     EKG:  All EKG's are interpreted by the Emergency Department Physician who either signs or Co-signsthis chart in the absence of a cardiologist.          RADIOLOGY: non-plain film images(s) such as CT, Ultrasound and MRI are read by the radiologist.    No orders to display       LABS:    Labs Reviewed - No data to display    EMERGENCY DEPARTMENT COURSE:   Vitals:    Vitals:    07/02/20 0632   BP: 134/88   Pulse: 92   Resp: 16   Temp: 97.7 °F (36.5 °C)   TempSrc: Oral   SpO2: 98%   Weight: (!) 325 lb (147.4 kg)   Height: 5' 8\" (1.727 m)      6:53 AM EDT: The patient was seen and evaluated. Patient presents for evaluation after FIDEL drain was accidentally removed this morning. She denies any change in her symptoms. She states she was having minimal output from the drain prior to its removal.  She is currently at a nursing home for IV antibiotics and denies any recent fevers or change in her pain. Discussed the removal with Dr. Sameer Taylor, provider on-call for the patient's plastic surgeon. He states no acute management needs to be done. She can follow-up in 1 week as scheduled with our office or call for sooner follow-up should she have worsening symptoms. Discussed with the patient who is agreeable. She denied any further needs upon discharge. CRITICAL CARE:   None    CONSULTS:  None    PROCEDURES:  None    FINAL IMPRESSION      1.  Postoperative surgical complication involving musculoskeletal system associated with musculoskeletal procedure, unspecified complication          DISPOSITION/PLAN   Discharge    PATIENT REFERRED TO:  [facility] [address] [address]  [phone]      as scheduled, sooner if increasing pain, drainage, fever or redness    [facility] EMERGENCY DEPT  [address] [address]  [phone]    If symptoms worsen      DISCHARGEMEDICATIONS:  New Prescriptions    No medications on file       (Please note that portions of this note were completedwith a voice recognition program.  Efforts were made to edit the dictations but occasionally words are mis-transcribed.)        Carina Yadav PA-C  07/02/20 9516

## 2020-07-02 NOTE — ED NOTES
ED nurse-to-nurse bedside report    Chief Complaint   Patient presents with    Post-op Problem     dislodged FIDEL drain      LOC: alert and orientated to name, place, date  Vital signs   Vitals:    07/02/20 0632   BP: 134/88   Pulse: 92   Resp: 16   Temp: 97.7 °F (36.5 °C)   TempSrc: Oral   SpO2: 98%   Weight: (!) 325 lb (147.4 kg)   Height: 5' 8\" (1.727 m)      Pain:    Pain Interventions: pt denies pain  Pain Goal: no pain  Oxygen: No    Current needs required none   Telemetry: No  LDAs:    Continuous Infusions:   Mobility: Requires assistance * 2  Delacruz Fall Risk Score: No flowsheet data found.   Fall Interventions: fall band, bed lowest position, side rails up x2  Report given to: ANNA Crockett  07/02/20 3893

## 2020-07-11 ENCOUNTER — HOSPITAL ENCOUNTER (EMERGENCY)
Age: 60
Discharge: HOME | End: 2020-07-12
Attending: EMERGENCY MEDICINE
Payer: MEDICARE

## 2020-07-11 LAB
ANION GAP SERPL CALCULATED.3IONS-SCNC: 14 MEQ/L (ref 8–16)
BASOPHILS # BLD: 1.4 %
BASOPHILS ABSOLUTE: 0.1 THOU/MM3 (ref 0–0.1)
BUN BLDV-MCNC: 16 MG/DL (ref 7–22)
CALCIUM SERPL-MCNC: 8.9 MG/DL (ref 8.5–10.5)
CHLORIDE BLD-SCNC: 101 MEQ/L (ref 98–111)
CO2: 21 MEQ/L (ref 23–33)
CREAT SERPL-MCNC: 0.7 MG/DL (ref 0.4–1.2)
EOSINOPHIL # BLD: 2.9 %
EOSINOPHILS ABSOLUTE: 0.3 THOU/MM3 (ref 0–0.4)
ERYTHROCYTE [DISTWIDTH] IN BLOOD BY AUTOMATED COUNT: 15.1 % (ref 11.5–14.5)
ERYTHROCYTE [DISTWIDTH] IN BLOOD BY AUTOMATED COUNT: 45.8 FL (ref 35–45)
GFR SERPL CREATININE-BSD FRML MDRD: 86 ML/MIN/1.73M2
GLUCOSE BLD-MCNC: 115 MG/DL (ref 70–108)
HCT VFR BLD CALC: 37.9 % (ref 37–47)
HEMOGLOBIN: 11.9 GM/DL (ref 12–16)
IMMATURE GRANS (ABS): 0.05 THOU/MM3 (ref 0–0.07)
IMMATURE GRANULOCYTES: 0.6 %
LACTIC ACID: 1.2 MMOL/L (ref 0.5–2.2)
LYMPHOCYTES # BLD: 36.8 %
LYMPHOCYTES ABSOLUTE: 3.2 THOU/MM3 (ref 1–4.8)
MCH RBC QN AUTO: 26.2 PG (ref 26–33)
MCHC RBC AUTO-ENTMCNC: 31.4 GM/DL (ref 32.2–35.5)
MCV RBC AUTO: 83.3 FL (ref 81–99)
MONOCYTES # BLD: 8.8 %
MONOCYTES ABSOLUTE: 0.8 THOU/MM3 (ref 0.4–1.3)
NUCLEATED RED BLOOD CELLS: 0 /100 WBC
OSMOLALITY CALCULATION: 274.1 MOSMOL/KG (ref 275–300)
PLATELET # BLD: 349 THOU/MM3 (ref 130–400)
PMV BLD AUTO: 9.4 FL (ref 9.4–12.4)
POTASSIUM SERPL-SCNC: 4.3 MEQ/L (ref 3.5–5.2)
RBC # BLD: 4.55 MILL/MM3 (ref 4.2–5.4)
SEG NEUTROPHILS: 49.5 %
SEGMENTED NEUTROPHILS ABSOLUTE COUNT: 4.4 THOU/MM3 (ref 1.8–7.7)
SODIUM BLD-SCNC: 136 MEQ/L (ref 135–145)
WBC # BLD: 8.8 THOU/MM3 (ref 4.8–10.8)

## 2020-07-11 PROCEDURE — 96374 THER/PROPH/DIAG INJ IV PUSH: CPT

## 2020-07-11 PROCEDURE — 36415 COLL VENOUS BLD VENIPUNCTURE: CPT

## 2020-07-11 PROCEDURE — 96375 TX/PRO/DX INJ NEW DRUG ADDON: CPT

## 2020-07-11 PROCEDURE — 6360000002 HC RX W HCPCS: Performed by: PHYSICIAN ASSISTANT

## 2020-07-11 PROCEDURE — 80048 BASIC METABOLIC PNL TOTAL CA: CPT

## 2020-07-11 PROCEDURE — 83605 ASSAY OF LACTIC ACID: CPT

## 2020-07-11 PROCEDURE — 87040 BLOOD CULTURE FOR BACTERIA: CPT

## 2020-07-11 PROCEDURE — 99285 EMERGENCY DEPT VISIT HI MDM: CPT

## 2020-07-11 PROCEDURE — 85025 COMPLETE CBC W/AUTO DIFF WBC: CPT

## 2020-07-11 RX ORDER — ONDANSETRON 2 MG/ML
4 INJECTION INTRAMUSCULAR; INTRAVENOUS ONCE
Status: COMPLETED | OUTPATIENT
Start: 2020-07-11 | End: 2020-07-11

## 2020-07-11 RX ORDER — MORPHINE SULFATE 4 MG/ML
4 INJECTION, SOLUTION INTRAMUSCULAR; INTRAVENOUS ONCE
Status: COMPLETED | OUTPATIENT
Start: 2020-07-11 | End: 2020-07-11

## 2020-07-11 RX ADMIN — ONDANSETRON 4 MG: 2 INJECTION INTRAMUSCULAR; INTRAVENOUS at 19:28

## 2020-07-11 RX ADMIN — MORPHINE SULFATE 4 MG: 4 INJECTION, SOLUTION INTRAMUSCULAR; INTRAVENOUS at 19:28

## 2020-07-11 ASSESSMENT — PAIN DESCRIPTION - ORIENTATION
ORIENTATION: LOWER;RIGHT;LEFT
ORIENTATION: LOWER

## 2020-07-11 ASSESSMENT — PAIN SCALES - GENERAL
PAINLEVEL_OUTOF10: 3
PAINLEVEL_OUTOF10: 7
PAINLEVEL_OUTOF10: 4
PAINLEVEL_OUTOF10: 7

## 2020-07-11 ASSESSMENT — PAIN DESCRIPTION - PAIN TYPE
TYPE: ACUTE PAIN
TYPE: ACUTE PAIN

## 2020-07-11 ASSESSMENT — PAIN DESCRIPTION - LOCATION
LOCATION: BACK
LOCATION: BACK

## 2020-07-11 ASSESSMENT — PAIN DESCRIPTION - DESCRIPTORS
DESCRIPTORS: STABBING
DESCRIPTORS: ACHING

## 2020-07-11 ASSESSMENT — PAIN DESCRIPTION - PROGRESSION: CLINICAL_PROGRESSION: GRADUALLY IMPROVING

## 2020-07-11 ASSESSMENT — PAIN DESCRIPTION - FREQUENCY: FREQUENCY: CONTINUOUS

## 2020-07-11 NOTE — ED TRIAGE NOTES
Pt presents to the ED from [facility] via EMS. Pt states her PICC line was red and had pus drainage. Pt is currently receiving IV antibiotics that she was receiving through the PICC for a Staph infection in the back. Pt hx back surgery in May. Pt has nausea and back pain from surgery rating 7/10. Pt currently has two drains draining fluids. Pt states she is anxious. Pt sister at bedside at this time.

## 2020-07-11 NOTE — ED PROVIDER NOTES
Attending Physician Note:    Patient was seen by PA-C Arden Lesch  and I co-managed the case    I have personally performed and participated in the history, exam and medical decision making and agree with all pertinent clinical information. I have also reviewed and agree with the past medical, family and social history unless otherwise noted. Patient presented to the emergency room due to possible infection on the PICC line site on the right chest.  The patient patient had a lumbar laminectomy done in May 2020 and got infected and subsequently had a wound vacuum and the laminectomy wound was sutured back last June 20, 2020 and since then the patient been taking oxacillin through the PICC line on the anterior chest.. Physical examination showed lungs clear to auscultation heart regular rate and rhythm chest wall symmetrical no retraction there is a PICC line in the right chest with the site is surrounded by erythema and scanty drainage, back showed extensive sutured wound without any sign of infection            Evaluation: Agree above , seen the patient and discussed the diagnosis and treatment plans     FINAL IMPRESSION       1. Visit for wound check    2. Local infection due to PICC (peripherally inserted central catheter), initial encounter            DISPOSITION/PLAN  PATIENT REFERRED TO:  Melinda Snyder MD  [address] [place] [address]  [phone]    Schedule an appointment as soon as possible for a visit in 2 days  without fail. DISCHARGE MEDICATIONS:  Discharge Medication List as of 7/11/2020  9:50 PM          (Please note that portions of this note were completed with a voice recognition program and electronically transcribed. Efforts were [facility] edit the dictations but occasionally words are mis-transcribed . The transcription may contain errors not detected in proofreading.   This transcription was electronically signed.)      Dalia Foster MD      Emergency room physician       Theodora Hope MD  07/16/20 9339

## 2020-07-11 NOTE — ED NOTES
Pt stated she did not want the morphine because it makes her sick. Asked for dilaudid instead. This nurse spoke with provider.      Daphne Zavala RN  07/11/20 1921

## 2020-07-11 NOTE — ED PROVIDER NOTES
Reason for Visit: Blood Infection (PICC line infected)      HISTORY OF PRESENT ILLNESS       HPI: This 64 y.o. femalepresents to the emergency department for check of her wound on her right anterior chest.  Patient has a central line which she receives antibiotics around-the-clock and her nursing home facility. Patient noted it to be red and concerned that may be possible related. No fever chills nausea vomiting. Patient states that she has a long history of back surgeries which will became infected and the wound opened and will require wound VAC to around-the-clock IV antibiotics. Patient had her central line placed in the middle of June and is been receiving IV antibiotics since. No chest pain shortness of breath nausea vomiting headache fevers or chills. No other complaints.     Past Medical History:   Diagnosis Date    Alcohol abuse 6/13/2016    Anxiety     Chronic low back pain     Depression     Hepatitis C     Hypertension     MDD (major depressive disorder), recurrent severe, without psychosis ([facility] [address] 1/1/2020    Morbid obesity with BMI of 40.0-44.9, adult ([facility] [address]     Schizoaffective disorder ([facility] [address]     Suicide attempt by polypharm overdose ([facility] [address] 5/24/2016       Past Surgical History:   Procedure Laterality Date    ANKLE SURGERY      BACK SURGERY  nov 2013    at [address]      off end of tailbone    DIALYSIS FISTULA CREATION Right 2/11/2020    RIGHT WRIST EXPLORATION, FOREIGN BODY REMOVAL performed by Jose Luis Goldstein MD at [address] [place] [address]   Social History     Socioeconomic History    Marital status: Single     Spouse name: Not on file    Number of children: 3    Years of education: Not on file    Highest education level: Not on file   Occupational History     Comment: unemployed on disability   Social Needs    Financial resource strain: Not on file    Food insecurity     Worry: Not on file Inability: Not on file    Transportation needs     Medical: Not on file     Non-medical: Not on file   Tobacco Use    Smoking status: Current Every Day Smoker     Packs/day: 0.50     Years: 1.00     Pack years: 0.50     Types: Cigarettes    Smokeless tobacco: Never Used    Tobacco comment: Refused   Substance and Sexual Activity    Alcohol use: Not Currently     Alcohol/week: 6.0 standard drinks     Types: 6 Cans of beer per week     Comment: last used alcohol 6/12/2016    Drug use: Not Currently     Comment: reports 4 months sober    Sexual activity: Never     Partners: Male   Lifestyle    Physical activity     Days per week: Not on file     Minutes per session: Not on file    Stress: Not on file   Relationships    Social connections     Talks on phone: Not on file     Gets together: Not on file     Attends [employer] service: Not on file     Active member of club or organization: Not on file     Attends meetings of clubs or organizations: Not on file     Relationship status: Not on file    Intimate partner violence     Fear of current or ex partner: Not on file     Emotionally abused: Not on file     Physically abused: Not on file     Forced sexual activity: Not on file   Other Topics Concern    Not on file   Social History Narrative    Not on file       Previous Medications    ACETAMINOPHEN (TYLENOL) 325 MG TABLET    Take 650 mg by mouth every 6 hours as needed for Pain    ALBUTEROL SULFATE  (90 BASE) MCG/ACT INHALER    Inhale 1 puff into the lungs every 6 hours as needed for Wheezing 12/03/19 medication list Pt has inhaler with her (locked in medication cabinet envelope # [id number])    ALUMINUM-MAGNESIUM HYDROXIDE 200-200 MG/5ML SUSPENSION    Take by mouth as needed for Indigestion    ASCORBIC ACID (VITAMIN C) 500 MG TABLET    Take 500 mg by mouth daily    BISACODYL (DULCOLAX) 10 MG SUPPOSITORY    Place 10 mg rectally daily as needed for Constipation    CEPHALEXIN (KEFLEX) 500 MG CAPSULE    Take 500 mg by mouth 3 times daily    CYCLOBENZAPRINE (FLEXERIL) 10 MG TABLET    Take 10 mg by mouth 3 times daily as needed for Muscle spasms    DOCUSATE SODIUM (COLACE) 100 MG CAPSULE    Take 100 mg by mouth 2 times daily    ENOXAPARIN (LOVENOX) 100 MG/ML INJECTION    Inject into the skin 2 times daily    FERROUS GLUCONATE (FERGON) 324 (38 FE) MG TABLET    Take 324 mg by mouth daily (with breakfast)    FLUOXETINE (PROZAC) 20 MG CAPSULE    Take 40 mg by mouth daily    GABAPENTIN (NEURONTIN) 400 MG CAPSULE    Take 2 capsules by mouth 3 times daily for 15 days. GLECAPREVIR-PIBRENTASVIR (MAVYRET PO)    Take by mouth    HYDROXYZINE (VISTARIL) 50 MG CAPSULE    Take 50 mg by mouth 3 times daily as needed for Itching    LURASIDONE (LATUDA) 40 MG TABS TABLET    Take 40 mg by mouth daily    MULTIPLE VITAMINS-MINERALS (THERAPEUTIC MULTIVITAMIN-MINERALS) TABLET    Take 1 tablet by mouth daily adrenal support    NORTRIPTYLINE (PAMELOR) 75 MG CAPSULE    Take 1 capsule by mouth nightly    QUETIAPINE (SEROQUEL) 300 MG TABLET    Take 1 tablet by mouth nightly    QUETIAPINE (SEROQUEL) 50 MG TABLET    Take 1 tablet by mouth daily    TRAZODONE (DESYREL) 50 MG TABLET    Take 1 tablet by mouth nightly as needed for Sleep       Allergies: Allergies as of 07/11/2020 - Review Complete 07/11/2020   Allergen Reaction Noted    Codeine Nausea And Vomiting 11/25/2019    Morphine Nausea And Vomiting 02/03/2020       Review of Systems       See HPI for further details. At least 10 systems reviewed and are otherwise negative unless noted in the history of present illness.      Constitutional: Denies fever or chills   Eyes: Denies change in visual acuity   HENT: Denies nasal congestion or sore throat   Respiratory: Denies cough or shortness of breath   Cardiovascular: Denies chest pain or edema   GI: Denies abdominal pain, nausea, vomiting, bloody stools or diarrhea   : Denies dysuria   Musculoskeletal: Denies back pain or joint pain Integument: Admits rash   Neurologic: Denies headache, focal weakness or sensory changes   Endocrine: Denies polyuria or polydipsia   Lymphatic: Denies swollen glands   Psychiatric: Denies depression or anxiety       Physical Exam       Vitals:    07/11/20 1826 07/11/20 1934 07/11/20 2009   BP: (!) 149/118 (!) 157/85 (!) 131/96   Pulse: 106 102 112   Resp: 15 17 17   Temp: 98.1 °F (36.7 °C)     TempSrc: Oral     SpO2: 97% 98% 96%   Weight: (!) 328 lb (148.8 kg)     Height: 5' 8\" (1.727 m)         Constitutional: No acute distress, Non-toxic appearance; well nourished    HENT: Normocephalic, Atraumatic, Bilateral external ears normal, Oropharynx moist, No oral exudates, Nose normal.    Eyes: PERRLA, EOMI, Conjunctiva normal, No discharge. Neck: Normal range of motion, Supple,No stridor. Cardiovascular: Normal heart rate, Normal rhythm, No murmurs, No rubs, No gallops. Pulmonary/Chest: Normal breath sounds, No respiratory distress, No wheezing,     Abdomen: Bowel sounds normal, Soft, No tenderness, No masses, No pulsatile masses    Back: No tenderness, No CVA tenderness    Extremities: Normal range of motion, Intact distal pulses, No edema, No tenderness    Neurologic: Alert & oriented x 3; Normal motor function, Normal sensory function, No focal defecits    Skin: Warm, Dry, blanchable erythema to right anterior chest surrounding central line. No pus noted from drainage. Adipose tissue was noted. Psychiatric: Alert and oriented to person, place, and time. Patient maintains good eye contact. Mood and affect were normal. Concentration appeared normal      Diagnostic Studies       Please see electronic medical record for any tests performed in the ED.      Labs:    Labs Reviewed   CBC WITH AUTO DIFFERENTIAL - Abnormal; Notable for the following components:       Result Value    Hemoglobin 11.9 (*)     MCHC 31.4 (*)     RDW-CV 15.1 (*)     RDW-SD 45.8 (*)     All other components within normal limits questions were answered, and she agreed to assessment and plan. Patient was DISCHARGED from the hospital. Based on the reassuring ED workup and patient's stable vital signs, I feel the patient may be safely discharged home. At this point in time, I believe the patient has the mental capacity to make medical decisions.         Harsha Armendariz, [place]  07/12/20 8684

## 2020-07-12 VITALS
DIASTOLIC BLOOD PRESSURE: 67 MMHG | BODY MASS INDEX: 44.41 KG/M2 | RESPIRATION RATE: 18 BRPM | SYSTOLIC BLOOD PRESSURE: 114 MMHG | HEIGHT: 68 IN | TEMPERATURE: 98.1 F | HEART RATE: 95 BPM | OXYGEN SATURATION: 97 % | WEIGHT: 293 LBS

## 2020-07-12 NOTE — ED NOTES
ED nurse-to-nurse bedside report    Chief Complaint   Patient presents with    Blood Infection     PICC line infected      LOC: alert and orientated to name, place, date  Vital signs   Vitals:    07/11/20 1826 07/11/20 1934 07/11/20 2009 07/11/20 2241   BP: (!) 149/118 (!) 157/85 (!) 131/96    Pulse: 106 102 112 90   Resp: 15 17 17 19   Temp: 98.1 °F (36.7 °C)      TempSrc: Oral      SpO2: 97% 98% 96% 97%   Weight: (!) 328 lb (148.8 kg)      Height: 5' 8\" (1.727 m)         Pain:    Pain Interventions: View MAR  Pain Goal: 3  Oxygen: No    Current needs required  from LACP   Telemetry: No  LDAs:    Continuous Infusions:   Mobility: Independent  Delacruz Fall Risk Score: No flowsheet data found.   Fall Interventions: Side rails up x1, call light within reach  Report given to: [facility], RN  07/11/20 2028

## 2020-07-12 NOTE — ED NOTES
Report received from [facility]. Pt resting on cot. No needs voiced. Will monitor.      Annemarie Christopher RN  07/11/20 0084

## 2020-07-12 NOTE — ED NOTES
Pt surgical wound re-dressed. Sister at bedside. Call light within reach.      Erica Doe RN  07/11/20 2011

## 2020-07-13 ENCOUNTER — HOSPITAL ENCOUNTER (EMERGENCY)
Age: 60
Discharge: HOME | End: 2020-07-13
Attending: FAMILY MEDICINE
Payer: MEDICARE

## 2020-07-13 VITALS
RESPIRATION RATE: 17 BRPM | SYSTOLIC BLOOD PRESSURE: 137 MMHG | DIASTOLIC BLOOD PRESSURE: 89 MMHG | HEART RATE: 104 BPM | OXYGEN SATURATION: 98 %

## 2020-07-13 PROCEDURE — 2709999900 HC NON-CHARGEABLE SUPPLY

## 2020-07-13 PROCEDURE — 99282 EMERGENCY DEPT VISIT SF MDM: CPT

## 2020-07-13 ASSESSMENT — ENCOUNTER SYMPTOMS
BACK PAIN: 1
SHORTNESS OF BREATH: 0

## 2020-07-13 NOTE — ED NOTES
Bed: 003A  Expected date:   Expected time:   Means of arrival: [facility]  Comments:     Tim Miramontes  07/13/20 9533

## 2020-07-13 NOTE — ED PROVIDER NOTES
[facility]  eMERGENCY dEPARTMENT eNCOUnter          CHIEF COMPLAINT     No chief complaint on file. Nurses Notes reviewed and I agree except as noted in the HPI. HISTORY OF PRESENT ILLNESS    Simi Aguirre is a 64 y.o. female who presents for PICC line removal    Location/Symptom: Right chest PICC line  Timing/Onset: PICC line was placed in [facility] and was used for infection in her back    Context/Setting: The line has been discontinued for antibiotics  She has had some redness around the entry site right chest  She is here to have this removed  Quality: PICC line was placed in the right chest threaded up into the jugular vein      Duration: Here for removal today  Modifying Factors: None   Severity: 2/10    REVIEW OF SYSTEMS     Review of Systems   Constitutional: Negative for chills and fever. Respiratory: Negative for shortness of breath. Cardiovascular: Negative for chest pain. Musculoskeletal: Positive for back pain. She has had her back surgery done in [facility] then had a secondary infection and had to have a long nursing home stay    Currently being converted to oral antibiotics   Hematological: Negative for adenopathy. PAST MEDICAL HISTORY    has a past medical history of Alcohol abuse, Anxiety, Chronic low back pain, Depression, Hepatitis C, Hypertension, MDD (major depressive disorder), recurrent severe, without psychosis ([address] Morbid obesity with BMI of 40.0-44.9, adult ([address] Schizoaffective disorder ([address] and Suicide attempt by polypharm overdose ([facility] SURGICAL HISTORY      has a past surgical history that includes Tubal ligation; Tonsillectomy; Ankle surgery; cyst removal; Pilonidal cyst excision; back surgery (nov 2013); and Dialysis fistula creation (Right, 2/11/2020).     CURRENT MEDICATIONS       Previous Medications    ACETAMINOPHEN (TYLENOL) 325 MG TABLET    Take 650 mg by mouth every 6 hours as needed for Pain    ALBUTEROL SULFATE  (90 BASE) MCG/ACT INHALER    Inhale 1 puff into the lungs every 6 hours as needed for Wheezing 12/03/19 medication list Pt has inhaler with her (locked in medication cabinet envelope # [id number])    ALUMINUM-MAGNESIUM HYDROXIDE 200-200 MG/5ML SUSPENSION    Take by mouth as needed for Indigestion    ASCORBIC ACID (VITAMIN C) 500 MG TABLET    Take 500 mg by mouth daily    BISACODYL (DULCOLAX) 10 MG SUPPOSITORY    Place 10 mg rectally daily as needed for Constipation    CEPHALEXIN (KEFLEX) 500 MG CAPSULE    Take 500 mg by mouth 3 times daily    CYCLOBENZAPRINE (FLEXERIL) 10 MG TABLET    Take 10 mg by mouth 3 times daily as needed for Muscle spasms    DOCUSATE SODIUM (COLACE) 100 MG CAPSULE    Take 100 mg by mouth 2 times daily    ENOXAPARIN (LOVENOX) 100 MG/ML INJECTION    Inject into the skin 2 times daily    FERROUS GLUCONATE (FERGON) 324 (38 FE) MG TABLET    Take 324 mg by mouth daily (with breakfast)    FLUOXETINE (PROZAC) 20 MG CAPSULE    Take 40 mg by mouth daily    GABAPENTIN (NEURONTIN) 400 MG CAPSULE    Take 2 capsules by mouth 3 times daily for 15 days. GLECAPREVIR-PIBRENTASVIR (MAVYRET PO)    Take by mouth    HYDROXYZINE (VISTARIL) 50 MG CAPSULE    Take 50 mg by mouth 3 times daily as needed for Itching    LURASIDONE (LATUDA) 40 MG TABS TABLET    Take 40 mg by mouth daily    MULTIPLE VITAMINS-MINERALS (THERAPEUTIC MULTIVITAMIN-MINERALS) TABLET    Take 1 tablet by mouth daily adrenal support    NORTRIPTYLINE (PAMELOR) 75 MG CAPSULE    Take 1 capsule by mouth nightly    QUETIAPINE (SEROQUEL) 300 MG TABLET    Take 1 tablet by mouth nightly    QUETIAPINE (SEROQUEL) 50 MG TABLET    Take 1 tablet by mouth daily    TRAZODONE (DESYREL) 50 MG TABLET    Take 1 tablet by mouth nightly as needed for Sleep       ALLERGIES     is allergic to codeine and morphine. FAMILY HISTORY     She indicated that the status of her mother is unknown. She indicated that the status of her father is unknown.  She indicated that her sister is alive. family history includes Diabetes in her mother; Heart Disease in her father; Mental Illness in her father. SOCIAL HISTORY      reports that she has been smoking cigarettes. She has a 0.50 pack-year smoking history. She has never used smokeless tobacco. She reports previous alcohol use of about 6.0 standard drinks of alcohol per week. She reports previous drug use. PHYSICAL EXAM     INITIAL VITALS:  blood pressure is 137/89 and her pulse is 104. Her respiration is 17 and oxygen saturation is 98%. Physical Exam  Vitals signs and nursing note reviewed. HENT:      Head: Normocephalic and atraumatic. Cardiovascular:      Rate and Rhythm: Normal rate and regular rhythm. Pulmonary:      Effort: Pulmonary effort is normal.      Breath sounds: Normal breath sounds. Comments: Right-sided PICC line just above her breast enters the chest about midclavicular line go superiorly to the jugular region    Slight erythema around this PICC line site    1 suture was holding in place    Lungs were clear  Neurological:      Mental Status: She is alert. DIFFERENTIAL DIAGNOSIS:     Patient here for removal of PICC line    DIAGNOSTIC RESULTS       LABS:   Labs Reviewed - No data to display    EMERGENCY DEPARTMENT COURSE:   Vitals:    Vitals:    07/13/20 1229   BP: 137/89   Pulse: 104   Resp: 17   SpO2: 98%     Nursing notes reviewed    The PICC line was held in place with a single suture    The suture was cut and the PICC line pulled out without difficulty    Dressing was applied    Discharge home    CRITICAL CARE:   none    CONSULTS:  none    PROCEDURES:    PICC line removal      FINAL IMPRESSION      1.  PIC line (peripherally inserted central catheter) removal          DISPOSITION/PLAN     Discharge home    PATIENT REFERRED TO:  EZE Yeboah - CNP  [address] [facility]  [phone]      As needed      DISCHARGE MEDICATIONS:  New Prescriptions    No medications on file (Please note that portions of this note were completed with a voice recognition program.  Efforts were made to edit the dictations but occasionally words are mis-transcribed.)    MD Hung Reinoso MD  07/13/20 6180

## 2020-07-13 NOTE — ED TRIAGE NOTES
Patient presents from [facility] with needing her PICC line pulled.  [facility] does not have staff available to pull line

## 2020-07-17 LAB
BLOOD CULTURE, ROUTINE: NORMAL
BLOOD CULTURE, ROUTINE: NORMAL

## 2020-07-23 ENCOUNTER — HOSPITAL ENCOUNTER (EMERGENCY)
Age: 60
Discharge: HOME | End: 2020-07-23
Attending: FAMILY MEDICINE
Payer: MEDICARE

## 2020-07-23 VITALS
DIASTOLIC BLOOD PRESSURE: 83 MMHG | RESPIRATION RATE: 20 BRPM | OXYGEN SATURATION: 99 % | TEMPERATURE: 98.2 F | SYSTOLIC BLOOD PRESSURE: 169 MMHG | HEART RATE: 105 BPM

## 2020-07-23 PROCEDURE — 2580000003 HC RX 258: Performed by: STUDENT IN AN ORGANIZED HEALTH CARE EDUCATION/TRAINING PROGRAM

## 2020-07-23 PROCEDURE — 96375 TX/PRO/DX INJ NEW DRUG ADDON: CPT

## 2020-07-23 PROCEDURE — 99283 EMERGENCY DEPT VISIT LOW MDM: CPT

## 2020-07-23 PROCEDURE — 6370000000 HC RX 637 (ALT 250 FOR IP)

## 2020-07-23 PROCEDURE — 96360 HYDRATION IV INFUSION INIT: CPT

## 2020-07-23 PROCEDURE — 6360000002 HC RX W HCPCS: Performed by: STUDENT IN AN ORGANIZED HEALTH CARE EDUCATION/TRAINING PROGRAM

## 2020-07-23 RX ORDER — ACETAMINOPHEN 500 MG
TABLET ORAL
Status: COMPLETED
Start: 2020-07-23 | End: 2020-07-23

## 2020-07-23 RX ORDER — ACETAMINOPHEN 500 MG
1000 TABLET ORAL ONCE
Status: COMPLETED | OUTPATIENT
Start: 2020-07-23 | End: 2020-07-23

## 2020-07-23 RX ORDER — PROCHLORPERAZINE EDISYLATE 5 MG/ML
10 INJECTION INTRAMUSCULAR; INTRAVENOUS ONCE
Status: COMPLETED | OUTPATIENT
Start: 2020-07-23 | End: 2020-07-23

## 2020-07-23 RX ORDER — 0.9 % SODIUM CHLORIDE 0.9 %
1000 INTRAVENOUS SOLUTION INTRAVENOUS ONCE
Status: COMPLETED | OUTPATIENT
Start: 2020-07-23 | End: 2020-07-23

## 2020-07-23 RX ADMIN — Medication 1000 MG: at 13:38

## 2020-07-23 RX ADMIN — ACETAMINOPHEN 1000 MG: 500 TABLET ORAL at 13:38

## 2020-07-23 RX ADMIN — SODIUM CHLORIDE 1000 ML: 9 INJECTION, SOLUTION INTRAVENOUS at 13:33

## 2020-07-23 RX ADMIN — PROCHLORPERAZINE EDISYLATE 10 MG: 5 INJECTION INTRAMUSCULAR; INTRAVENOUS at 13:39

## 2020-07-23 ASSESSMENT — ENCOUNTER SYMPTOMS
PHOTOPHOBIA: 1
EYE PAIN: 0
WHEEZING: 0
EYE DISCHARGE: 0
VOMITING: 0
SHORTNESS OF BREATH: 0
SINUS PAIN: 0
SORE THROAT: 0
RHINORRHEA: 0
COUGH: 0
ABDOMINAL PAIN: 0
COLOR CHANGE: 0
NAUSEA: 1
SINUS PRESSURE: 0

## 2020-07-23 ASSESSMENT — PAIN SCALES - GENERAL
PAINLEVEL_OUTOF10: 7
PAINLEVEL_OUTOF10: 10

## 2020-07-23 NOTE — ED PROVIDER NOTES
pain and neck stiffness. Chronic back pain. Skin: Negative for color change and pallor. Neurological: Positive for headaches. Negative for dizziness, syncope, weakness and light-headedness.          PAST MEDICAL AND SURGICAL HISTORY     Past Medical History:   Diagnosis Date    Alcohol abuse 6/13/2016    Anxiety     Chronic low back pain     Depression     Hepatitis C     Hypertension     MDD (major depressive disorder), recurrent severe, without psychosis ([facility] [address] 1/1/2020    Morbid obesity with BMI of 40.0-44.9, adult ([facility]     Schizoaffective disorder ([facility] [facility]     Su[facility] 5/24/2016     Past Surgical History:   Procedure Laterality Date    ANKLE SURGERY      BACK SURGERY  nov 2013    at [address]      off end of tailbone    DIALYSIS FISTULA CREATION Right 2/11/2020    RIGHT WRIST EXPLORATION, FOREIGN BODY REMOVAL performed by Talisha Pruitt MD at [address] [facility]     Current Facility-Administered Medications:     0.9 % sodium chloride bolus, 1,000 mL, Intravenous, Once, Allison Pacheco MD, Last Rate: 1,000 mL/hr at 07/23/20 1333, 1,000 mL at 07/23/20 1333    Current Outpatient Medications:     acetaminophen (TYLENOL) 325 MG tablet, Take 650 mg by mouth every 6 hours as needed for Pain, Disp: , Rfl:     ascorbic acid (VITAMIN C) 500 MG tablet, Take 500 mg by mouth daily, Disp: , Rfl:     bisacodyl (DULCOLAX) 10 MG suppository, Place 10 mg rectally daily as needed for Constipation, Disp: , Rfl:     cyclobenzaprine (FLEXERIL) 10 MG tablet, Take 10 mg by mouth 3 times daily as needed for Muscle spasms, Disp: , Rfl:     docusate sodium (COLACE) 100 MG capsule, Take 100 mg by mouth 2 times daily, Disp: , Rfl:     enoxaparin (LOVENOX) 100 MG/ML injection, Inject into the skin 2 times daily, Disp: , Rfl:     ferrous gluconate (FERGON) 324 (38 Fe) MG tablet, Take 324 mg by mouth daily (with breakfast), Disp: , Rfl:     hydrOXYzine (VISTARIL) 50 MG capsule, Take 50 mg by mouth 3 times daily as needed for Itching, Disp: , Rfl:     cephALEXin (KEFLEX) 500 MG capsule, Take 500 mg by mouth 3 times daily, Disp: , Rfl:     lurasidone (LATUDA) 40 MG TABS tablet, Take 40 mg by mouth daily, Disp: , Rfl:     aluminum-magnesium hydroxide 200-200 MG/5ML suspension, Take by mouth as needed for Indigestion, Disp: , Rfl:     Glecaprevir-Pibrentasvir (MAVYRET PO), Take by mouth, Disp: , Rfl:     FLUoxetine (PROZAC) 20 MG capsule, Take 40 mg by mouth daily, Disp: , Rfl:     gabapentin (NEURONTIN) 400 MG capsule, Take 2 capsules by mouth 3 times daily for 15 days. , Disp: 90 capsule, Rfl: 0    nortriptyline (PAMELOR) 75 MG capsule, Take 1 capsule by mouth nightly, Disp: 30 capsule, Rfl: 0    traZODone (DESYREL) 50 MG tablet, Take 1 tablet by mouth nightly as needed for Sleep, Disp: 30 tablet, Rfl: 0    QUEtiapine (SEROQUEL) 300 MG tablet, Take 1 tablet by mouth nightly, Disp: 30 tablet, Rfl: 0    QUEtiapine (SEROQUEL) 50 MG tablet, Take 1 tablet by mouth daily, Disp: 30 tablet, Rfl: 0    Multiple Vitamins-Minerals (THERAPEUTIC MULTIVITAMIN-MINERALS) tablet, Take 1 tablet by mouth daily adrenal support, Disp: , Rfl:     albuterol sulfate  (90 Base) MCG/ACT inhaler, Inhale 1 puff into the lungs every 6 hours as needed for Wheezing 12/03/19 medication list Pt has inhaler with her (locked in medication cabinet envelope # [id number]), Disp: , Rfl:       SOCIAL HISTORY     Social History     Social History Narrative    Not on file     Social History     Tobacco Use    Smoking status: Former Smoker     Packs/day: 0.50     Years: 1.00     Pack years: 0.50     Types: Cigarettes    Smokeless tobacco: Never Used   Substance Use Topics    Alcohol use: Not Currently     Alcohol/week: 6.0 standard drinks     Types: 6 Cans of beer per week     Comment: last used alcohol 6/12/2016    Drug use: Not Currently     Comment: reports 4 months sober         ALLERGIES     Allergies   Allergen Reactions    Codeine Nausea And Vomiting    Morphine Nausea And Vomiting         FAMILY HISTORY     Family History   Problem Relation Age of Onset    Diabetes Mother     Heart Disease Father     Mental Illness Father          PREVIOUS RECORDS   Previous records reviewed: Patient was seen here in the past due to a dislodged P drain. PHYSICAL EXAM     ED Triage Vitals [07/23/20 1322]   BP Temp Temp Source Pulse Resp SpO2 Height Weight   (!) 169/83 98.2 °F (36.8 °C) Oral 105 20 99 % -- --     Initial vital signs and nursing assessment reviewed and abnormal from Tachycardia. Pulsoximetry is normal per my interpretation. Additional Vital Signs:  Vitals:    07/23/20 1322   BP: (!) 169/83   Pulse: 105   Resp: 20   Temp: 98.2 °F (36.8 °C)   SpO2: 99%       Physical Exam  Vitals signs and nursing note reviewed. Constitutional:       General: She is not in acute distress. Appearance: She is obese. She is not ill-appearing or toxic-appearing. HENT:      Head: Normocephalic and atraumatic. Right Ear: External ear normal.      Left Ear: External ear normal.      Nose: Nose normal. No congestion. Mouth/Throat:      Mouth: Mucous membranes are moist.      Pharynx: Oropharynx is clear. No oropharyngeal exudate or posterior oropharyngeal erythema. Eyes:      Extraocular Movements: Extraocular movements intact. Conjunctiva/sclera: Conjunctivae normal.      Pupils: Pupils are equal, round, and reactive to light. Neck:      Musculoskeletal: Normal range of motion and neck supple. No neck rigidity or muscular tenderness. Cardiovascular:      Rate and Rhythm: Normal rate and regular rhythm. Pulses: Normal pulses. Heart sounds: Normal heart sounds. No murmur. Pulmonary:      Effort: Pulmonary effort is normal. No respiratory distress. Breath sounds: Normal breath sounds. No wheezing. Abdominal:      General: Bowel sounds are normal.      Palpations: Abdomen is soft. Tenderness: There is no abdominal tenderness. There is no guarding or rebound. Comments: FIDEL drain in place. Musculoskeletal: Normal range of motion. Lymphadenopathy:      Cervical: No cervical adenopathy. Skin:     General: Skin is warm and dry. Capillary Refill: Capillary refill takes 2 to 3 seconds. Neurological:      General: No focal deficit present. Mental Status: She is alert and oriented to person, place, and time. Mental status is at baseline. Cranial Nerves: No cranial nerve deficit. Sensory: No sensory deficit. Motor: No weakness. Gait: Gait normal.   Psychiatric:         Mood and Affect: Mood normal.         Behavior: Behavior normal.              MEDICAL DECISION MAKING   Initial Assessment: This is a 61-year-old woman complaining of diffuse headache this morning that has been constant in nature. Patient has no focal neurological deficits or other concerning neurological findings along with no fevers, neck rigidity or stiffness. Pain control with Compazine and Tylenol given. Patient will also receive IV fluids for rehydration. Patient reevaluated at [phone] is no longer having any pain and is ready to be discharged home. Will have close follow-up with her primary care physician in the next week.       ED RESULTS   Laboratory results:  Labs Reviewed - No data to display    Radiologic studies results:  No orders to display       ED Medications administered this visit:   Medications   0.9 % sodium chloride bolus (1,000 mLs Intravenous New Bag 7/23/20 1333)   prochlorperazine (COMPAZINE) injection 10 mg (10 mg Intravenous Given 7/23/20 1339)   acetaminophen (TYLENOL) tablet 1,000 mg (1,000 mg Oral Given 7/23/20 1338)         ED COURSE      Strict return precautions and follow up instructions were discussed with the patient prior to discharge, with which the patient agrees. MEDICATION CHANGES     New Prescriptions    No medications on file         FINAL DISPOSITION     Final diagnoses:   Migraine without aura and without status migrainosus, not intractable     Condition: condition: good  Dispo: Discharge to home      This transcription was electronically signed. Parts of this transcriptions may have been dictated by use of voice recognition software and electronically transcribed, and parts may have been transcribed with the assistance of an ED scribe. The transcription may contain errors not detected in proofreading. Please refer to my supervising physician's documentation if my documentation differs.     Electronically Signed: Alysha Trujillo, 07/23/20, 2:30 PM        Alysha Trujillo MD  Resident  07/23/20 7667

## 2020-07-23 NOTE — ED TRIAGE NOTES
Patient to ER due to having headache and high blood pressure. Patient states her systolic blood pressure at home was in the 200's. She was advised to come in from family doc.

## 2022-07-11 ENCOUNTER — OFFICE VISIT (OUTPATIENT)
Dept: PHYSICAL MEDICINE AND REHAB | Age: 62
End: 2022-07-11
Payer: MEDICARE

## 2022-07-11 VITALS
SYSTOLIC BLOOD PRESSURE: 124 MMHG | WEIGHT: 293 LBS | HEIGHT: 68 IN | DIASTOLIC BLOOD PRESSURE: 82 MMHG | BODY MASS INDEX: 44.41 KG/M2

## 2022-07-11 DIAGNOSIS — M47.816 FACET HYPERTROPHY OF LUMBAR REGION: ICD-10-CM

## 2022-07-11 DIAGNOSIS — Z98.1 S/P LUMBAR FUSION: ICD-10-CM

## 2022-07-11 DIAGNOSIS — M53.3 SI (SACROILIAC) JOINT DYSFUNCTION: ICD-10-CM

## 2022-07-11 DIAGNOSIS — M51.36 LUMBAR DEGENERATIVE DISC DISEASE: ICD-10-CM

## 2022-07-11 DIAGNOSIS — M53.3 SACROILIAC JOINT PAIN: Primary | ICD-10-CM

## 2022-07-11 DIAGNOSIS — M96.1 FAILED BACK SYNDROME: ICD-10-CM

## 2022-07-11 DIAGNOSIS — M54.17 RADICULOPATHY, LUMBOSACRAL REGION: ICD-10-CM

## 2022-07-11 DIAGNOSIS — G89.4 CHRONIC PAIN SYNDROME: ICD-10-CM

## 2022-07-11 DIAGNOSIS — M79.18 MYOFASCIAL PAIN: ICD-10-CM

## 2022-07-11 PROCEDURE — G8427 DOCREV CUR MEDS BY ELIG CLIN: HCPCS | Performed by: NURSE PRACTITIONER

## 2022-07-11 PROCEDURE — 1036F TOBACCO NON-USER: CPT | Performed by: NURSE PRACTITIONER

## 2022-07-11 PROCEDURE — 3017F COLORECTAL CA SCREEN DOC REV: CPT | Performed by: NURSE PRACTITIONER

## 2022-07-11 PROCEDURE — 99205 OFFICE O/P NEW HI 60 MIN: CPT | Performed by: NURSE PRACTITIONER

## 2022-07-11 PROCEDURE — G8417 CALC BMI ABV UP PARAM F/U: HCPCS | Performed by: NURSE PRACTITIONER

## 2022-07-11 NOTE — PATIENT INSTRUCTIONS
Attend physical therapy- water therapy- to help with pain control, muscle spasm, weakness.      Continue working on weight loss, healthy diet, increased activity    Contact Dr Holly Lindquist regarding follow up- stay in touch about hardware     Continue smoking cessation- stopping smoking     Continue seeing Dr Edith Salgado for mental health

## 2022-07-11 NOTE — PROGRESS NOTES
[address] [facility] [address]  Dept: [phone]  Dept Fax: [phone]: [phone]    Visit Date: 7/11/2022    Pilar Keys is a 63 y.o. female who is referred for pain management evaluation and treatment per Dr. Roberto Carlos Maravilla. Chronic Pain/PM&R Clinic Note     Encounter Date: 7/11/22    Subjective:   Chief Complaint:   Chief Complaint   Patient presents with    New Patient     \"stabbing and burning pain\"       History of Present Illness:   Pilar Keys is a 63 y.o. female seen in the clinic initially on 07/11/2022 [facility] for her history of low back pain. She has a personal medical history of bi polar, anxiety, depression, suicide attempt by overdose, ETOH abuse, Drug abuse, schizoaffective disorder, delusions, back surgery, DM, HLD. Patient reports low back pain for years and years. She states it is bilateral low back, right side is worse. She reports it will occasionally go down the right leg to the foot, and left side is low back and buttocks only. She describes the pain as sharp,shooting, burning, tingling, that is constant but varies in intesity. She denies numbness, loss of bowel or bladder. She denies any accident, injury or falls that caused her pain. She moved to [place] from [place] [address] in 2013, to be near family, states she saw pain management there and she thinks in [place] [address] as well but is unsure. She is unsure when she last saw pain management, said they let her go debora she did not have her pills as someone took them. 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 She reports she does have bilateral leg weakness, occasionally has had falls, 2-3 times in the last year. She states she is more careful with walking, and uses a cane or walker is she feels weak.  She reports she has done formal physical therapy, maybe a year ago, in [place]. She states she would like to go back again. She reports that certain movement, walking, standing, sitting to long all make the pain worse. Laying down, changing positions will help the pain. She states that pain will interfere with her sleep, hard to get comfortable, but once asleep does not wake her up. Pain is same throughout the day. She reports she has used marijuana, drugs and alcohol in the past to help with her pain, but none recent. She denies current ETOH, THC, drug use. Last used illicit drugs over 6 months ago, reports THC, and \"anything that helped the pain\". She reports she is trying to lose weight, working with weight management and wellness. She report she saw [facility] for [facility] spine surgery and MRI results, was told to lose weight and stop smoking and then they would re-evaluate. She does reports occasional muscle spasms in the mid to low back. Pain scale : at worst pain is 10/10, at best pain is 3/10. History of Interventions:   Surgery: Lumbar L2-5 laminectomy, t12-s1 fusion 2013  Injections: yes, prior to surgery, unsure what they did.      Current Treatment Medications:   Tylenol- mild relief  Ibuprofen - no relief  Neurontin- with relief    Historical Treatment Medications:   Topicals-mild relief  Narcotics- prescribed and some not- relief  Flexeril - relief  Baclofen- relief    Imaging:  LUMBAR XR 4/2021    Lumbar MRI 4/2021      PELVIC XR 4/2021    Past Medical History:   Diagnosis Date    Alcohol abuse 6/13/2016    Anxiety     Chronic low back pain     Depression     Hepatitis C     Hypertension     MDD (major depressive disorder), recurrent severe, without psychosis ([address] 1/1/2020    Morbid obesity with BMI of 40.0-44.9, adult ([address]     Schizoaffective disorder ([address]     Suicide attempt by polypharm overdose ([address] 5/24/2016       Past Surgical History:   Procedure Laterality Date    ANKLE SURGERY      BACK [place]  nov [place]    CYST REMOVAL      off end of tailbone    DIALYSIS FISTULA CREATION Right 2/11/2020    RIGHT WRIST EXPLORATION, FOREIGN BODY REMOVAL performed by Jeane Calix MD at [place] [address]      TONSILLECTOMY      TUBAL LIGATION         Family History   Problem Relation Age of Onset    Diabetes Mother     Heart Disease Father     Mental Illness Father          Medications & Allergies:   Current Outpatient Medications   Medication Instructions    acetaminophen (TYLENOL) 650 mg, Oral, EVERY 6 HOURS PRN    albuterol sulfate  (90 Base) MCG/ACT inhaler 1 puff, Inhalation, EVERY 6 HOURS PRN, 12/03/19 medication list Pt has inhaler with her (locked in medication cabinet envelope # [id number])     aluminum-magnesium hydroxide 200-200 MG/5ML suspension Oral, PRN    ascorbic acid (VITAMIN C) 500 mg, Oral, DAILY    bisacodyl (DULCOLAX) 10 mg, Rectal, DAILY PRN    cephALEXin (KEFLEX) 500 mg, Oral, 3 TIMES DAILY    cyclobenzaprine (FLEXERIL) 10 mg, Oral, 3 TIMES DAILY PRN    docusate sodium (COLACE) 100 mg, Oral, 2 TIMES DAILY    enoxaparin (LOVENOX) 100 MG/ML injection SubCUTAneous, 2 TIMES DAILY    ferrous gluconate (FERGON) 324 mg, Oral, DAILY WITH BREAKFAST    FLUoxetine (PROZAC) 40 mg, Oral, DAILY    gabapentin (NEURONTIN) 800 mg, Oral, 3 TIMES DAILY    Glecaprevir-Pibrentasvir (MAVYRET PO) Oral    hydrOXYzine pamoate (VISTARIL) 50 mg, Oral, 3 TIMES DAILY PRN    lurasidone (LATUDA) 40 mg, Oral, DAILY    Multiple Vitamins-Minerals (THERAPEUTIC MULTIVITAMIN-MINERALS) tablet 1 tablet, Oral, DAILY, adrenal support    nortriptyline (PAMELOR) 75 mg, Oral, NIGHTLY    QUEtiapine (SEROQUEL) 300 mg, Oral, NIGHTLY    QUEtiapine (SEROQUEL) 50 mg, Oral, DAILY    traZODone (DESYREL) 50 mg, Oral, NIGHTLY PRN       Allergies   Allergen Reactions    Codeine Nausea And Vomiting    Morphine Nausea And Vomiting       Review of Systems:   Constitutional: negative for weight changes or fevers  Genitourinary: negative for bowel/bladder incontinence   Musculoskeletal: positive for low back pain, SI pain, right lrg pain  Neurological: positive for right leg weakness and tingling  Behavioral/Psych: positive for anxiety/depression   All other systems reviewed and are negative    Objective:     Vitals:    07/11/22 1210   BP: 124/82       Constitutional: Pleasant, no acute distress   Head: Normocephalic, atraumatic   Eyes: Conjunctivae normal   Neck: Supple, symmetrical   Lungs: Normal respiratory effort, non-labored breathing   Cardiovascular: Limbs warm and well perfused   Abdomen: Non-protruded   Musculoskeletal: Muscle bulk symmetric, no atrophy, no gross deformities   · Lower Extremities: ROM WNL. · Thorax: No paraspinal tenderness bilaterally. No scoliosis or kyphosis. · Lumbar Spine: ROM reduced. Lumbar paraspinals non-tender to palpation bilaterally. SLR pos right. DANILO pos bilaterally. GAENSLEN pos bilaterally. positive facet loading bilaterally. Bilateral SI joints tender to palpation. SI Distraction maneuver positive on left/right side. Bilateral greater trochanters tender to palpation. Neurological: Cranial nerves II-XII grossly intact. · Gait - Normal, non-antalgic gait. Ambulates without assistive device. · Motor: 5/5 muscle strength in bilateral hip flexion, knee flexion, knee extension, ankle dorsiflexion, and ankle plantar flexion   · Sensory: LT sensation intact in lower limbs   · Reflexes: 2+ symmetrical in bilateral achilles, 2+ bilateral patellar, negative ankle clonus  Skin: No rashes or lesions present   Psychological: Cooperative, no exaggerated pain behaviors     Assessment:    Diagnosis Orders   1. Sacroiliac joint pain  Ambulatory referral to Physical Therapy   2. SI (sacroiliac) joint dysfunction  Ambulatory referral to Physical Therapy   3. Facet hypertrophy of lumbar region  Ambulatory referral to Physical Therapy   4. Radiculopathy, lumbosacral region     5. Lumbar degenerative disc disease     6. S/P lumbar fusion  Ambulatory referral to Physical Therapy   7. Failed back syndrome     8. Myofascial pain  Ambulatory referral to Physical Therapy   9. Chronic pain syndrome  Ambulatory referral to Physical Therapy        Sinai Reeves is a 63 y. o.female presenting to the pain clinic for evaluation of low back pain. Discussed physical therapy, aquatic therapy, to assist with strengthening and pain control. She would like to complete this at [facility] as it is closer to her. Discussed updating images to assist with plan of care, possible bilateral SI injections with plans for radiofrequency ablation in the future. She is not a candidate for narcotics or muscle relaxer's with her history and complaints of weakness/falls. I will see her back in 10 weeks to re-evaluate after therapy is completed. Plan: The following treatment recommendations and plan were discussed in detail with Mervat Esposito. Imaging:   I have reviewed patients imaging of lumbar xr, pelvix xr, lumbar MRI and results were discussed with patient today. Analgesics: The patient is currently managing their pain with the use of neruontin which is prescribed by PCP, TISH Lieberman. We recommend that the patient follow the recommendation of the prescribing provider with regard to the above medication(s) and contact their prescribing provider for refills if needed. Patient is taking Acetaminophen. Patient informed that the maximum amount of acetaminophen taken on a regular basis should only be 4000 mg per day.      Adjuvants:   None    OARRS reviewed, pain contract agreement signed    Interventions:   None    Anticoagulation/NPO Recommendations:   None    Multidisciplinary Pain Management:   In the presence of complex, chronic, and multi-factorial pain, the importance of a multidisciplinary approach to pain management in the patients management regimen was emphasized and discussed in great detail. PHYSICAL THERAPY: Patient is advised to see a physical therapist for gentle stretching exercises and conditioning exercises for management of pain. PSYCHOLOGY: Patient is advised to see a clinical pain psychologist, for the psychosocial aspect of pain care through coping skills, relaxation strategies, cognitive group therapy etc.   OBESITY: Patient is advised to seek nutrition consult to help in managing their weight to decrease its impact on pain. SMOKING: Impact of smoking in patient's pain was discussed and patient is counseled against smoking. The patient was also advised to continue physical therapy and stretching exercises at home and cognitive behavioral and/or group therapy. Referrals:  Aquatic therapy     Prescriptions Written This Visit:   None    Follow-up:   After physical therapy     It was my pleasure to evaluate Mervat Esposito today. I spent over 60 minutes evaluating this patient, reviewing previous notes and images and completing documentation.        EZE Garcia - CNP   Interventional Pain Management/PM&R   [place]

## 2022-09-12 NOTE — BH NOTE
24 hour chart review completed
24 hour chart review completed.
Behavioral Health   Admission Note     Admission Type:   Admission Type: Involuntary    Reason for admission:  Reason for Admission: Patient is unsure    PATIENT STRENGTHS:  Strengths: Positive Support    Patient Strengths and Limitations:  Limitations: Lacks leisure interests    Addictive Behavior:   Addictive Behavior  In the past 3 months, have you felt or has someone told you that you have a problem with:  : None  Do you have a history of Chemical Use?: No  Do you have a history of Alcohol Use?: No  Do you have a history of Street Drug Abuse?: No  Histroy of Prescripton Drug Abuse?: No    Medical Problems:   Past Medical History:   Diagnosis Date    Alcohol abuse 6/13/2016    Anxiety     Chronic low back pain     Depression     Hepatitis C     Hypertension     MDD (major depressive disorder), recurrent severe, without psychosis ([facility] 1/1/2020    Morbid obesity with BMI of 40.0-44.9, adult ([facility]     Schizoaffective disorder ([facility]     Suicide attempt by polypharm overdose ([facility] 5/24/2016       Status EXAM:  Status and Exam  Normal: No  Facial Expression: Avoids Gaze, Flat  Affect: Blunt  Level of Consciousness: Alert  Mood:Normal: No  Mood: Irritable, Suspicious  Motor Activity:Normal: No  Motor Activity: Decreased  Interview Behavior: Evasive, Uncooperative/Withdrawn  Preception: [place] to Person, Fredo Danilo to Time, [place] to Place  Attention:Normal: No  Attention: Distractible  Thought Processes: Blocking  Thought Content:Normal: No  Thought Content: Paranoia  Hallucinations: Auditory (Comment)(Noted talking to unseen persons.)  Delusions: No  Memory:Normal: No  Memory: Poor Recent  Insight and Judgment: No  Insight and Judgment: Poor Judgment, Poor Insight  Present Suicidal Ideation: No  Present Homicidal Ideation: No    Pt admitted with followings belongings:  Dentures: None  Vision - Corrective Lenses: None  Hearing Aid: None  Jewelry: None  Body Piercings Removed: No  Clothing:  Footwear, Other
Group Therapy Note    Date: 3/18/2020  Start Time: 2000  End Time:  2020    Type of Group: Wrap-Up and relaxatipon    Patient's Goal:  Get back surgery    Notes:  Not met    Status After Intervention:  Unchanged    Participation Level: Minimal     nctioning: Blunted      Mood: irritable, depressed      Level of consciousness:  Alert      Response to Learning: Progressing to goal      Endings: None Reported    Modes of Intervention: Socialization      Discipline Responsible: Registered Nurse      Signature:  Coni Petersen RN
INPATIENT RECREATIONAL THERAPY  ADULT BEHAVIORAL SERVICES  EVALUATION    REFERRING PHYSICIAN:   Dr. Leah Costa  DIAGNOSIS:     Schizoaffective Disorder  PRECAUTIONS:    Standard precautions    HISTORY OF PRESENT ILLNESS/INJURY:   Patient was admitted to the unit due to paranoia and delusional thoughts. Patient's sister stated that patient had similar behavior when she was abusing drugs last summer. Patient reported that someone put a chip in her and stated that she was having some auditory hallucinations. Patient also stated that she has severe back pain. Patient reportedly has a UTI. Patient wants discharge and denies all. Patent irritable and dismissive at time of evaluation. PMH:  Please see medical chart for prior medical history, allergies, and medication    HISTORY OF PSYCHIATRIC TREATMENT:  IP:  [facility]  OP:  [facility] in the past  ADDICTIONS:  [address]:     [birth year]  GENDER:   Female    MARITAL STATUS:   Single  EMPLOYMENT STATUS:   Unemployed  LIVING SITUATION/SUPPORT:  Was living at the [facility] but is currently staying with her sister. EDUCATIONAL LEVEL:       MEDICATION/DRUG USE:  History of polysubstance abuse. Methamphetamines. History of prescription abuse. LEISURE INTERESTS:   Arts/crafts, watching TV/Movies, listening to music, activities with friends, playing board games and cards   ACTIVITY PREFERENCE:  Individual at this time  ACTIVITY TYPES:    Passive. Active. Indoor. Outdoor. COGNITION:   A&Ox3    COPING:  poor  ATTENTION:  poor  RELAXATION:   Patient appeared anxious and irritable. SELF-ESTEEM:   Fair to poor  MOTIVATION:    poor    SOCIAL SKILLS:   Poor at this time  FRUSTRATION TOLERANCE:   Poor at this time  ATTENTION SEEKING:   Patient yelling and throwing things at staff on the unit.    COOPERATION:   Uncooperative and irritable  AFFECT:   flat  APPEARANCE:  disheveled    HEARING:   No problems noted  VISION:
Patient did not attend the goal group but did identify a goal to this staff this AM.
Patient did not attend the goal group or the recreation group today.
Yes

## 2023-06-27 ENCOUNTER — TELEPHONE (OUTPATIENT)
Dept: PHYSICAL MEDICINE AND REHAB | Age: 63
End: 2023-06-27

## 2024-04-23 ENCOUNTER — APPOINTMENT (OUTPATIENT)
Dept: GENERAL RADIOLOGY | Age: 64
End: 2024-04-23
Payer: MEDICAID

## 2024-04-23 ENCOUNTER — HOSPITAL ENCOUNTER (OUTPATIENT)
Age: 64
Setting detail: OBSERVATION
Discharge: HOME | End: 2024-04-24
Attending: EMERGENCY MEDICINE | Admitting: EMERGENCY MEDICINE
Payer: MEDICAID

## 2024-04-23 ENCOUNTER — APPOINTMENT (OUTPATIENT)
Dept: CT IMAGING | Age: 64
End: 2024-04-23
Payer: MEDICAID

## 2024-04-23 DIAGNOSIS — R07.9 CHEST PAIN, UNSPECIFIED TYPE: Primary | ICD-10-CM

## 2024-04-23 LAB
ALBUMIN SERPL-MCNC: 4.1 G/DL (ref 3.5–5.2)
ALBUMIN/GLOB SERPL: 1 {RATIO} (ref 1–2.5)
ALP SERPL-CCNC: 110 U/L (ref 35–104)
ALT SERPL-CCNC: 32 U/L (ref 10–35)
ANION GAP SERPL CALCULATED.3IONS-SCNC: 15 MMOL/L (ref 9–16)
AST SERPL-CCNC: 30 U/L (ref 10–35)
BASOPHILS # BLD: 0.1 K/UL (ref 0–0.2)
BASOPHILS NFR BLD: 1 % (ref 0–2)
BILIRUB SERPL-MCNC: 0.2 MG/DL (ref 0–1.2)
BNP SERPL-MCNC: 92 PG/ML (ref 0–300)
BUN SERPL-MCNC: 23 MG/DL (ref 8–23)
CALCIUM SERPL-MCNC: 8.7 MG/DL (ref 8.6–10.4)
CHLORIDE SERPL-SCNC: 104 MMOL/L (ref 98–107)
CO2 SERPL-SCNC: 21 MMOL/L (ref 20–31)
CREAT SERPL-MCNC: 0.9 MG/DL (ref 0.5–0.9)
D DIMER PPP FEU-MCNC: 0.65 UG/ML FEU (ref 0–0.57)
EOSINOPHIL # BLD: 0.17 K/UL (ref 0–0.44)
EOSINOPHILS RELATIVE PERCENT: 1 % (ref 1–4)
ERYTHROCYTE [DISTWIDTH] IN BLOOD BY AUTOMATED COUNT: 12.8 % (ref 11.8–14.4)
GFR SERPL CREATININE-BSD FRML MDRD: 76 ML/MIN/1.73M2
GLUCOSE SERPL-MCNC: 233 MG/DL (ref 74–99)
HCT VFR BLD AUTO: 38.2 % (ref 36.3–47.1)
HGB BLD-MCNC: 12.6 G/DL (ref 11.9–15.1)
IMM GRANULOCYTES # BLD AUTO: 0.04 K/UL (ref 0–0.3)
IMM GRANULOCYTES NFR BLD: 0 %
LACTIC ACID, SEPSIS WHOLE BLOOD: 2.9 MMOL/L (ref 0.5–1.9)
LYMPHOCYTES NFR BLD: 3.75 K/UL (ref 1.1–3.7)
LYMPHOCYTES RELATIVE PERCENT: 32 % (ref 24–43)
MCH RBC QN AUTO: 28.7 PG (ref 25.2–33.5)
MCHC RBC AUTO-ENTMCNC: 33 G/DL (ref 28.4–34.8)
MCV RBC AUTO: 87 FL (ref 82.6–102.9)
MONOCYTES NFR BLD: 0.91 K/UL (ref 0.1–1.2)
MONOCYTES NFR BLD: 8 % (ref 3–12)
NEUTROPHILS NFR BLD: 58 % (ref 36–65)
NEUTS SEG NFR BLD: 6.95 K/UL (ref 1.5–8.1)
NRBC BLD-RTO: 0 PER 100 WBC
PLATELET # BLD AUTO: ABNORMAL K/UL (ref 138–453)
PLATELET, FLUORESCENCE: 360 K/UL (ref 138–453)
PLATELETS.RETICULATED NFR BLD AUTO: 2.6 % (ref 1.1–10.3)
POTASSIUM SERPL-SCNC: 3.8 MMOL/L (ref 3.7–5.3)
PROT SERPL-MCNC: 7.1 G/DL (ref 6.6–8.7)
RBC # BLD AUTO: 4.39 M/UL (ref 3.95–5.11)
SODIUM SERPL-SCNC: 140 MMOL/L (ref 136–145)
TROPONIN I SERPL HS-MCNC: 11 NG/L (ref 0–14)
TROPONIN I SERPL HS-MCNC: 11 NG/L (ref 0–14)
WBC OTHER # BLD: 11.9 K/UL (ref 3.5–11.3)

## 2024-04-23 PROCEDURE — 96374 THER/PROPH/DIAG INJ IV PUSH: CPT

## 2024-04-23 PROCEDURE — 87040 BLOOD CULTURE FOR BACTERIA: CPT

## 2024-04-23 PROCEDURE — 2580000003 HC RX 258: Performed by: STUDENT IN AN ORGANIZED HEALTH CARE EDUCATION/TRAINING PROGRAM

## 2024-04-23 PROCEDURE — 99285 EMERGENCY DEPT VISIT HI MDM: CPT

## 2024-04-23 PROCEDURE — 6360000002 HC RX W HCPCS: Performed by: STUDENT IN AN ORGANIZED HEALTH CARE EDUCATION/TRAINING PROGRAM

## 2024-04-23 PROCEDURE — 6360000004 HC RX CONTRAST MEDICATION: Performed by: STUDENT IN AN ORGANIZED HEALTH CARE EDUCATION/TRAINING PROGRAM

## 2024-04-23 PROCEDURE — 93005 ELECTROCARDIOGRAM TRACING: CPT | Performed by: STUDENT IN AN ORGANIZED HEALTH CARE EDUCATION/TRAINING PROGRAM

## 2024-04-23 PROCEDURE — 71260 CT THORAX DX C+: CPT

## 2024-04-23 PROCEDURE — 83880 ASSAY OF NATRIURETIC PEPTIDE: CPT

## 2024-04-23 PROCEDURE — 6370000000 HC RX 637 (ALT 250 FOR IP): Performed by: STUDENT IN AN ORGANIZED HEALTH CARE EDUCATION/TRAINING PROGRAM

## 2024-04-23 PROCEDURE — 83605 ASSAY OF LACTIC ACID: CPT

## 2024-04-23 PROCEDURE — 84484 ASSAY OF TROPONIN QUANT: CPT

## 2024-04-23 PROCEDURE — 85025 COMPLETE CBC W/AUTO DIFF WBC: CPT

## 2024-04-23 PROCEDURE — 80053 COMPREHEN METABOLIC PANEL: CPT

## 2024-04-23 PROCEDURE — 85055 RETICULATED PLATELET ASSAY: CPT

## 2024-04-23 PROCEDURE — 71046 X-RAY EXAM CHEST 2 VIEWS: CPT

## 2024-04-23 PROCEDURE — 85379 FIBRIN DEGRADATION QUANT: CPT

## 2024-04-23 RX ORDER — 0.9 % SODIUM CHLORIDE 0.9 %
30 INTRAVENOUS SOLUTION INTRAVENOUS ONCE
Status: COMPLETED | OUTPATIENT
Start: 2024-04-23 | End: 2024-04-24

## 2024-04-23 RX ORDER — ASPIRIN 81 MG/1
324 TABLET, CHEWABLE ORAL ONCE
Status: COMPLETED | OUTPATIENT
Start: 2024-04-23 | End: 2024-04-23

## 2024-04-23 RX ORDER — FENTANYL CITRATE 50 UG/ML
50 INJECTION, SOLUTION INTRAMUSCULAR; INTRAVENOUS ONCE
Status: COMPLETED | OUTPATIENT
Start: 2024-04-23 | End: 2024-04-23

## 2024-04-23 RX ADMIN — SODIUM CHLORIDE 1917 ML: 9 INJECTION, SOLUTION INTRAVENOUS at 23:27

## 2024-04-23 RX ADMIN — ASPIRIN 81 MG 324 MG: 81 TABLET ORAL at 21:30

## 2024-04-23 RX ADMIN — IOPAMIDOL 75 ML: 755 INJECTION, SOLUTION INTRAVENOUS at 23:28

## 2024-04-23 RX ADMIN — FENTANYL CITRATE 50 MCG: 50 INJECTION INTRAMUSCULAR; INTRAVENOUS at 21:31

## 2024-04-23 ASSESSMENT — PAIN SCALES - GENERAL: PAINLEVEL_OUTOF10: 6

## 2024-04-23 ASSESSMENT — PAIN DESCRIPTION - ORIENTATION: ORIENTATION: MID

## 2024-04-23 ASSESSMENT — PAIN DESCRIPTION - LOCATION: LOCATION: CHEST

## 2024-04-23 ASSESSMENT — PAIN - FUNCTIONAL ASSESSMENT: PAIN_FUNCTIONAL_ASSESSMENT: 0-10

## 2024-04-24 ENCOUNTER — APPOINTMENT (OUTPATIENT)
Dept: MRI IMAGING | Age: 64
End: 2024-04-24
Payer: MEDICAID

## 2024-04-24 VITALS
RESPIRATION RATE: 18 BRPM | OXYGEN SATURATION: 96 % | SYSTOLIC BLOOD PRESSURE: 131 MMHG | HEIGHT: 68 IN | BODY MASS INDEX: 40.92 KG/M2 | WEIGHT: 270 LBS | DIASTOLIC BLOOD PRESSURE: 79 MMHG | HEART RATE: 70 BPM | TEMPERATURE: 97.7 F

## 2024-04-24 PROBLEM — R07.9 CHEST PAIN: Status: ACTIVE | Noted: 2024-04-24

## 2024-04-24 PROBLEM — I24.9 ACS (ACUTE CORONARY SYNDROME) (HCC): Status: ACTIVE | Noted: 2024-04-24

## 2024-04-24 LAB
CRP SERPL HS-MCNC: 4.9 MG/L (ref 0–5)
EKG ATRIAL RATE: 65 BPM
EKG P AXIS: 64 DEGREES
EKG P-R INTERVAL: 184 MS
EKG Q-T INTERVAL: 446 MS
EKG QRS DURATION: 86 MS
EKG QTC CALCULATION (BAZETT): 463 MS
EKG R AXIS: 62 DEGREES
EKG T AXIS: 69 DEGREES
EKG VENTRICULAR RATE: 65 BPM
ERYTHROCYTE [SEDIMENTATION RATE] IN BLOOD BY PHOTOMETRIC METHOD: 18 MM/HR (ref 0–30)
EST. AVERAGE GLUCOSE BLD GHB EST-MCNC: 183 MG/DL
GLUCOSE BLD-MCNC: 136 MG/DL (ref 65–105)
GLUCOSE BLD-MCNC: 145 MG/DL (ref 65–105)
HBA1C MFR BLD: 8 % (ref 4–6)
LACTIC ACID, SEPSIS WHOLE BLOOD: 1.4 MMOL/L (ref 0.5–1.9)

## 2024-04-24 PROCEDURE — 93010 ELECTROCARDIOGRAM REPORT: CPT | Performed by: INTERNAL MEDICINE

## 2024-04-24 PROCEDURE — 82947 ASSAY GLUCOSE BLOOD QUANT: CPT

## 2024-04-24 PROCEDURE — 96374 THER/PROPH/DIAG INJ IV PUSH: CPT

## 2024-04-24 PROCEDURE — 96372 THER/PROPH/DIAG INJ SC/IM: CPT

## 2024-04-24 PROCEDURE — 86140 C-REACTIVE PROTEIN: CPT

## 2024-04-24 PROCEDURE — 83036 HEMOGLOBIN GLYCOSYLATED A1C: CPT

## 2024-04-24 PROCEDURE — 72157 MRI CHEST SPINE W/O & W/DYE: CPT

## 2024-04-24 PROCEDURE — 85652 RBC SED RATE AUTOMATED: CPT

## 2024-04-24 PROCEDURE — G0378 HOSPITAL OBSERVATION PER HR: HCPCS

## 2024-04-24 PROCEDURE — 6370000000 HC RX 637 (ALT 250 FOR IP): Performed by: STUDENT IN AN ORGANIZED HEALTH CARE EDUCATION/TRAINING PROGRAM

## 2024-04-24 PROCEDURE — 96361 HYDRATE IV INFUSION ADD-ON: CPT

## 2024-04-24 PROCEDURE — 93005 ELECTROCARDIOGRAM TRACING: CPT | Performed by: STUDENT IN AN ORGANIZED HEALTH CARE EDUCATION/TRAINING PROGRAM

## 2024-04-24 PROCEDURE — 96376 TX/PRO/DX INJ SAME DRUG ADON: CPT

## 2024-04-24 PROCEDURE — 6360000002 HC RX W HCPCS: Performed by: STUDENT IN AN ORGANIZED HEALTH CARE EDUCATION/TRAINING PROGRAM

## 2024-04-24 PROCEDURE — 6360000002 HC RX W HCPCS: Performed by: EMERGENCY MEDICINE

## 2024-04-24 PROCEDURE — 2580000003 HC RX 258: Performed by: STUDENT IN AN ORGANIZED HEALTH CARE EDUCATION/TRAINING PROGRAM

## 2024-04-24 PROCEDURE — 99223 1ST HOSP IP/OBS HIGH 75: CPT | Performed by: INTERNAL MEDICINE

## 2024-04-24 PROCEDURE — 83605 ASSAY OF LACTIC ACID: CPT

## 2024-04-24 PROCEDURE — 6360000004 HC RX CONTRAST MEDICATION: Performed by: STUDENT IN AN ORGANIZED HEALTH CARE EDUCATION/TRAINING PROGRAM

## 2024-04-24 PROCEDURE — A9576 INJ PROHANCE MULTIPACK: HCPCS | Performed by: STUDENT IN AN ORGANIZED HEALTH CARE EDUCATION/TRAINING PROGRAM

## 2024-04-24 PROCEDURE — 36415 COLL VENOUS BLD VENIPUNCTURE: CPT

## 2024-04-24 RX ORDER — POLYETHYLENE GLYCOL 3350 17 G/17G
17 POWDER, FOR SOLUTION ORAL DAILY PRN
Status: DISCONTINUED | OUTPATIENT
Start: 2024-04-24 | End: 2024-04-24 | Stop reason: HOSPADM

## 2024-04-24 RX ORDER — CYCLOBENZAPRINE HCL 10 MG
10 TABLET ORAL 3 TIMES DAILY PRN
Qty: 30 TABLET | Refills: 0 | Status: SHIPPED | OUTPATIENT
Start: 2024-04-24

## 2024-04-24 RX ORDER — KETOROLAC TROMETHAMINE 15 MG/ML
15 INJECTION, SOLUTION INTRAMUSCULAR; INTRAVENOUS EVERY 6 HOURS
Status: DISCONTINUED | OUTPATIENT
Start: 2024-04-24 | End: 2024-04-24 | Stop reason: HOSPADM

## 2024-04-24 RX ORDER — LURASIDONE HYDROCHLORIDE 40 MG/1
40 TABLET, FILM COATED ORAL DAILY
Status: DISCONTINUED | OUTPATIENT
Start: 2024-04-24 | End: 2024-04-24 | Stop reason: HOSPADM

## 2024-04-24 RX ORDER — DEXTROSE MONOHYDRATE 100 MG/ML
INJECTION, SOLUTION INTRAVENOUS CONTINUOUS PRN
Status: DISCONTINUED | OUTPATIENT
Start: 2024-04-24 | End: 2024-04-24 | Stop reason: HOSPADM

## 2024-04-24 RX ORDER — TRAZODONE HYDROCHLORIDE 50 MG/1
50 TABLET ORAL NIGHTLY PRN
Status: DISCONTINUED | OUTPATIENT
Start: 2024-04-24 | End: 2024-04-24 | Stop reason: HOSPADM

## 2024-04-24 RX ORDER — ENOXAPARIN SODIUM 100 MG/ML
30 INJECTION SUBCUTANEOUS 2 TIMES DAILY
Status: DISCONTINUED | OUTPATIENT
Start: 2024-04-24 | End: 2024-04-24 | Stop reason: HOSPADM

## 2024-04-24 RX ORDER — POTASSIUM CHLORIDE 7.45 MG/ML
10 INJECTION INTRAVENOUS PRN
Status: DISCONTINUED | OUTPATIENT
Start: 2024-04-24 | End: 2024-04-24 | Stop reason: HOSPADM

## 2024-04-24 RX ORDER — POTASSIUM CHLORIDE 20 MEQ/1
40 TABLET, EXTENDED RELEASE ORAL PRN
Status: DISCONTINUED | OUTPATIENT
Start: 2024-04-24 | End: 2024-04-24 | Stop reason: HOSPADM

## 2024-04-24 RX ORDER — ACETAMINOPHEN 650 MG/1
650 SUPPOSITORY RECTAL EVERY 6 HOURS PRN
Status: DISCONTINUED | OUTPATIENT
Start: 2024-04-24 | End: 2024-04-24 | Stop reason: HOSPADM

## 2024-04-24 RX ORDER — ALBUTEROL SULFATE 90 UG/1
1 AEROSOL, METERED RESPIRATORY (INHALATION) EVERY 6 HOURS PRN
Status: DISCONTINUED | OUTPATIENT
Start: 2024-04-24 | End: 2024-04-24 | Stop reason: HOSPADM

## 2024-04-24 RX ORDER — SODIUM CHLORIDE 0.9 % (FLUSH) 0.9 %
10 SYRINGE (ML) INJECTION PRN
Status: DISCONTINUED | OUTPATIENT
Start: 2024-04-24 | End: 2024-04-24 | Stop reason: HOSPADM

## 2024-04-24 RX ORDER — GLUCAGON 1 MG/ML
1 KIT INJECTION PRN
Status: DISCONTINUED | OUTPATIENT
Start: 2024-04-24 | End: 2024-04-24 | Stop reason: HOSPADM

## 2024-04-24 RX ORDER — ACETAMINOPHEN 325 MG/1
650 TABLET ORAL EVERY 6 HOURS PRN
Status: DISCONTINUED | OUTPATIENT
Start: 2024-04-24 | End: 2024-04-24 | Stop reason: HOSPADM

## 2024-04-24 RX ORDER — CYCLOBENZAPRINE HCL 10 MG
10 TABLET ORAL 3 TIMES DAILY PRN
Status: DISCONTINUED | OUTPATIENT
Start: 2024-04-24 | End: 2024-04-24 | Stop reason: HOSPADM

## 2024-04-24 RX ORDER — LISINOPRIL 5 MG/1
5 TABLET ORAL DAILY
COMMUNITY

## 2024-04-24 RX ORDER — SODIUM CHLORIDE 9 MG/ML
INJECTION, SOLUTION INTRAVENOUS CONTINUOUS
Status: DISCONTINUED | OUTPATIENT
Start: 2024-04-24 | End: 2024-04-24 | Stop reason: HOSPADM

## 2024-04-24 RX ORDER — ONDANSETRON 2 MG/ML
4 INJECTION INTRAMUSCULAR; INTRAVENOUS EVERY 4 HOURS PRN
Status: DISCONTINUED | OUTPATIENT
Start: 2024-04-24 | End: 2024-04-24 | Stop reason: HOSPADM

## 2024-04-24 RX ORDER — GLIMEPIRIDE 1 MG/1
1 TABLET ORAL
COMMUNITY

## 2024-04-24 RX ORDER — DOXYCYCLINE HYCLATE 100 MG/1
100 CAPSULE ORAL 2 TIMES DAILY
COMMUNITY

## 2024-04-24 RX ORDER — FLUOXETINE HYDROCHLORIDE 20 MG/1
40 CAPSULE ORAL DAILY
Status: DISCONTINUED | OUTPATIENT
Start: 2024-04-24 | End: 2024-04-24 | Stop reason: HOSPADM

## 2024-04-24 RX ORDER — SODIUM CHLORIDE 9 MG/ML
INJECTION, SOLUTION INTRAVENOUS PRN
Status: DISCONTINUED | OUTPATIENT
Start: 2024-04-24 | End: 2024-04-24 | Stop reason: HOSPADM

## 2024-04-24 RX ORDER — NORTRIPTYLINE HYDROCHLORIDE 25 MG/1
75 CAPSULE ORAL NIGHTLY
Status: DISCONTINUED | OUTPATIENT
Start: 2024-04-24 | End: 2024-04-24 | Stop reason: HOSPADM

## 2024-04-24 RX ORDER — SODIUM CHLORIDE 0.9 % (FLUSH) 0.9 %
5-40 SYRINGE (ML) INJECTION EVERY 12 HOURS SCHEDULED
Status: DISCONTINUED | OUTPATIENT
Start: 2024-04-24 | End: 2024-04-24 | Stop reason: HOSPADM

## 2024-04-24 RX ORDER — IBUPROFEN 800 MG/1
800 TABLET ORAL EVERY 8 HOURS PRN
Qty: 60 TABLET | Refills: 0 | Status: SHIPPED | OUTPATIENT
Start: 2024-04-24

## 2024-04-24 RX ORDER — QUETIAPINE FUMARATE 300 MG/1
300 TABLET, FILM COATED ORAL NIGHTLY
Status: DISCONTINUED | OUTPATIENT
Start: 2024-04-24 | End: 2024-04-24 | Stop reason: HOSPADM

## 2024-04-24 RX ORDER — SODIUM CHLORIDE 0.9 % (FLUSH) 0.9 %
5-40 SYRINGE (ML) INJECTION PRN
Status: DISCONTINUED | OUTPATIENT
Start: 2024-04-24 | End: 2024-04-24 | Stop reason: HOSPADM

## 2024-04-24 RX ADMIN — CYCLOBENZAPRINE 10 MG: 10 TABLET, FILM COATED ORAL at 08:32

## 2024-04-24 RX ADMIN — ENOXAPARIN SODIUM 30 MG: 100 INJECTION SUBCUTANEOUS at 08:32

## 2024-04-24 RX ADMIN — SODIUM CHLORIDE, PRESERVATIVE FREE 5 ML: 5 INJECTION INTRAVENOUS at 08:41

## 2024-04-24 RX ADMIN — GADOTERIDOL 20 ML: 279.3 INJECTION, SOLUTION INTRAVENOUS at 14:52

## 2024-04-24 RX ADMIN — CYCLOBENZAPRINE 10 MG: 10 TABLET, FILM COATED ORAL at 03:03

## 2024-04-24 RX ADMIN — ACETAMINOPHEN 650 MG: 325 TABLET ORAL at 03:03

## 2024-04-24 RX ADMIN — FLUOXETINE HYDROCHLORIDE 40 MG: 20 CAPSULE ORAL at 08:32

## 2024-04-24 RX ADMIN — KETOROLAC TROMETHAMINE 15 MG: 15 INJECTION, SOLUTION INTRAMUSCULAR; INTRAVENOUS at 16:10

## 2024-04-24 RX ADMIN — SODIUM CHLORIDE: 9 INJECTION, SOLUTION INTRAVENOUS at 02:59

## 2024-04-24 RX ADMIN — KETOROLAC TROMETHAMINE 15 MG: 15 INJECTION, SOLUTION INTRAMUSCULAR; INTRAVENOUS at 11:24

## 2024-04-24 RX ADMIN — SODIUM CHLORIDE, PRESERVATIVE FREE 10 ML: 5 INJECTION INTRAVENOUS at 14:52

## 2024-04-24 ASSESSMENT — PAIN DESCRIPTION - DESCRIPTORS
DESCRIPTORS: THROBBING
DESCRIPTORS: DULL;SHARP
DESCRIPTORS: SORE;ACHING
DESCRIPTORS: DULL;SHARP

## 2024-04-24 ASSESSMENT — ENCOUNTER SYMPTOMS
VOMITING: 0
WHEEZING: 0
CHEST TIGHTNESS: 1
ABDOMINAL PAIN: 0
SHORTNESS OF BREATH: 1
BACK PAIN: 1
NAUSEA: 0

## 2024-04-24 ASSESSMENT — PAIN DESCRIPTION - LOCATION
LOCATION: BACK
LOCATION: BACK;CHEST
LOCATION: BACK;CHEST
LOCATION: BACK

## 2024-04-24 ASSESSMENT — PAIN SCALES - GENERAL
PAINLEVEL_OUTOF10: 7
PAINLEVEL_OUTOF10: 4
PAINLEVEL_OUTOF10: 7
PAINLEVEL_OUTOF10: 6

## 2024-04-24 ASSESSMENT — PAIN DESCRIPTION - ORIENTATION
ORIENTATION: LOWER
ORIENTATION: LOWER

## 2024-04-24 ASSESSMENT — PAIN DESCRIPTION - PAIN TYPE
TYPE: CHRONIC PAIN;ACUTE PAIN
TYPE: CHRONIC PAIN;ACUTE PAIN

## 2024-04-24 ASSESSMENT — PAIN - FUNCTIONAL ASSESSMENT: PAIN_FUNCTIONAL_ASSESSMENT: PREVENTS OR INTERFERES SOME ACTIVE ACTIVITIES AND ADLS

## 2024-04-24 NOTE — ED NOTES
The following labs were labeled with appropriate pt sticker and tubed to lab:     [] Blue     [] Lavender   [] on ice  [] Green/yellow  [] Green/black [] on ice  [] Grey  [] on ice  [] Yellow  [] Red  [] Pink  [] Type/ Screen  [] ABG  [] VBG    [] COVID-19 swab    [] Rapid  [] PCR  [] Flu swab  [] Peds Viral Panel     [] Urine Sample  [] Fecal Sample  [] Pelvic Cultures  [x] Blood Cultures  [] X 2  [] STREP Cultures  [] Wound Cultures

## 2024-04-24 NOTE — DISCHARGE SUMMARY
CDU Discharge Summary        Patient:  Mervat Esposito  YOB: 1960    MRN: [id number]   Acct: [id number]    Primary Care Physician: Francisca Avalos PA    Admit date:  4/23/2024  9:00 PM  Discharge date: 4/24/2024  6:24 PM     Discharge Diagnoses:     1.)  Acute chest pain secondary to unstable angina.  Improved with IV hydration, rest, analgesics and further cardiac evaluation    Follow-up:  Call today/tomorrow for a follow up appointment with Francisca Avalos PA , or return to the Emergency Room with worsening symptoms    Stressed to patient the importance of following up with primary care doctor for further workup/management of symptoms.  Pt verbalizes understanding and agrees with plan.    Discharge Medication Changes:       Medication List        START taking these medications      ibuprofen 800 MG tablet  Commonly known as: ADVIL;MOTRIN  Take 1 tablet by mouth every 8 hours as needed for Pain            CONTINUE taking these medications      albuterol sulfate  (90 Base) MCG/ACT inhaler  Commonly known as: PROVENTIL;VENTOLIN;PROAIR     ascorbic acid 500 MG tablet  Commonly known as: VITAMIN C     cyclobenzaprine 10 MG tablet  Commonly known as: FLEXERIL  Take 1 tablet by mouth 3 times daily as needed for Muscle spasms     doxycycline hyclate 100 MG capsule  Commonly known as: VIBRAMYCIN     ferrous gluconate 324 (38 Fe) MG tablet  Commonly known as: FERGON     gabapentin 400 MG capsule  Commonly known as: NEURONTIN  Take 2 capsules by mouth 3 times daily for 15 days.     glimepiride 1 MG tablet  Commonly known as: AMARYL     lisinopril 5 MG tablet  Commonly known as: PRINIVIL;ZESTRIL     metoprolol tartrate 25 MG tablet  Commonly known as: LOPRESSOR     therapeutic multivitamin-minerals tablet     vitamin D 25 MCG (1000 UT) Caps            ASK your doctor about these medications      hydrOXYzine pamoate 50 MG capsule  Commonly known as: VISTARIL     traZODone 50 MG tablet  Commonly known as:  based adjustment of the mA/kV was utilized to reduce the radiation dose to as low as reasonably achievable. COMPARISON: None HISTORY: ORDERING SYSTEM PROVIDED HISTORY:  SOB, elevated D-dimer TECHNOLOGIST PROVIDED HISTORY: SOB, elevated D-dimer Additional Contrast?->1 Reason for Exam:  SOB, elevated D-dimer FINDINGS: Pulmonary Arteries: Pulmonary arteries are adequately opacified for evaluation.  No evidence of intraluminal filling defect to suggest pulmonary embolism.  Main pulmonary artery is normal in caliber. Mediastinum: No evidence of mediastinal lymphadenopathy.  The heart and pericardium demonstrate no acute abnormality.  There is no acute abnormality of the thoracic aorta.  No evidence of pericardial effusion or pericardial thickening.  No evidence of thoracic aortic aneurysm or dissection. Lungs/Pleura: No evidence of infiltrates or acute process in the lungs.  In the lingula of left lung there is a calcified granuloma measuring 1.3 cm.  In the right lower lobe there is a tiny calcified granuloma noted.  No pleural effusion, pneumothorax or pneumomediastinum. Upper Abdomen: Limited images of the upper abdomen are unremarkable. Soft Tissues/Bones: Evidence of posterior spinal fusion in spine from T12 level through lumbar spine but not completely visualized on this study. Evidence of significant sclerotic changes involving the mid and lower portion of T11 vertebral body and upper portion of T12 vertebral body in association with the transpedicular screw which is located just below the level of the superior endplate of T12 vertebra.  The inferior endplate of T11 vertebral body appears to be irregular.  The findings may be due to instability at T11-T12 level.  However theoretically discitis is not excluded.  If there is clinical suspicion of discitis, follow-up MRI, with and without contrast, of lower thoracic spine and lumbar spine may be considered.     1. No evidence of pulmonary embolism or acute pulmonary

## 2024-04-24 NOTE — ED NOTES
The following labs were labeled with appropriate pt sticker and tubed to lab:     [x] Blue     [x] Lavender   [] on ice  [x] Green/yellow  [x] Green/black [x] on ice  [] Grey  [] on ice  [] Yellow  [] Red  [] Pink  [] Type/ Screen  [] ABG  [] VBG    [] COVID-19 swab    [] Rapid  [] PCR  [] Flu swab  [] Peds Viral Panel     [] Urine Sample  [] Fecal Sample  [] Pelvic Cultures  [] Blood Cultures  [] X 2  [] STREP Cultures

## 2024-04-24 NOTE — PLAN OF CARE
Problem: Discharge Planning  Goal: Discharge to home or other facility with appropriate resources  4/24/2024 1527 by Milla Singh RN  Outcome: Progressing  4/24/2024 0540 by Lilian Hayes RN  Outcome: Progressing     Problem: Safety - Adult  Goal: Free from fall injury  4/24/2024 1527 by Milla Singh RN  Outcome: Progressing  4/24/2024 0540 by Lilian Hayes RN  Outcome: Progressing     Problem: Pain  Goal: Verbalizes/displays adequate comfort level or baseline comfort level  4/24/2024 1527 by Milla Singh RN  Outcome: Progressing  4/24/2024 0540 by Lilian Hayes RN  Outcome: Progressing      Continue atorvastatin

## 2024-04-24 NOTE — CARE COORDINATION
Case Management Assessment  Initial Evaluation    Date/Time of Evaluation: 4/24/2024 10:18 AM  Assessment Completed by: FANG ROBERTS RN    If patient is discharged prior to next notation, then this note serves as note for discharge by case management.    Patient Name: Mervat Esposito                   YOB: 1960  Diagnosis: Chest pain [R07.9]  Chest pain, unspecified type [R07.9]                   Date / Time: 4/23/2024  9:00 PM    Patient Admission Status: Observation   Readmission Risk (Low < 19, Mod (19-27), High > 27): No data recorded  Current PCP: Francisca Avalos PA  PCP verified by CM? (P) Yes    Chart Reviewed: Yes      History Provided by: (P) Patient  Patient Orientation: (P) Alert and Oriented    Patient Cognition: (P) Alert    Hospitalization in the last 30 days (Readmission):  No    If yes, Readmission Assessment in  Navigator will be completed.    Advance Directives:      Code Status: Full Code   Patient's Primary Decision Maker is:        Discharge Planning:    Patient lives with: (P) Alone Type of Home: (P) Shelter  Primary Care Giver: (P) Self  Patient Support Systems include: (P) Family Members   Current Financial resources: (P) Medicaid  Current community resources: (P) Transportation, Lodging  Current services prior to admission: (P) Transportation            Current DME:              Type of Home Care services:  (P) None    ADLS  Prior functional level: (P) Independent in ADLs/IADLs  Current functional level: (P) Independent in ADLs/IADLs    PT AM-PAC:   /24  OT AM-PAC:   /24    Family can provide assistance at DC: (P) Other (comment) (unknown)  Would you like Case Management to discuss the discharge plan with any other family members/significant others, and if so, who? (P) No  Plans to Return to Present Housing: (P) Yes (staying at the  on [place], homeless)  Other Identified Issues/Barriers to RETURNING to current housing: possible Surgery  Potential Assistance needed

## 2024-04-24 NOTE — H&P
[facility]  CDU / OBSERVATION ENCOUNTER  Physician NOTE     Pt Name: Mervat Esposito  MRN: [id number]  Birthdate 1960  Date of evaluation: 4/24/24  Patient's PCP is :  Francisca Avalos PA    CHIEF COMPLAINT       Chief Complaint   Patient presents with    Chest Pain    Shortness of Breath         HISTORY OF PRESENT ILLNESS    Mervat Esposito is a 64 y.o. female who presents history of hypertension, diabetes, schizoaffective disorder, hyper cholesterolemia.  Patient has a history of IV drug abuse and methamphetamine use.  Patient presents with midsternal chest pain ongoing for the past day.  Patient states this is worse with eating and she took a nitro and this did resolve some of her pain.  Patient has midsternal chest pressure radiating down left arm.  Patient has associated diaphoresis.  Patient does not have nausea.  She has some right upper quadrant abdominal pain as well.  Patient has seen by her primary physician for similar symptoms in February.  Patient was recently on antibiotics for bronchitis.    As part of her workup she had a CT PE study.  There was incidental finding of thoracic hardware as well as a T11 instability where discitis cannot be ruled out.  Given history of IV drug abuse neurosurgery has been consulted in addition to cardiology for the chest pain.  Patient admitted to the observation unit for MRI in the morning with neurosurgical consultation and cardiology evaluation.    Location/Symptom: Midsternal chest pain  Timing/Onset: Hours prior to presentation  Provocation: Unclear.  Seems to be associated with the eating  Quality: Pressure-like sensation with radiation to left arm.  Radiation: Left arm  Severity: Moderate to severe when present waxing and waning.  Timing/Duration: Waxing and waning  Modifying Factors: Improved with nitroglycerin    History was obtained in part through review of the ED chart. When possible, a direct discussion was had with ED nurses, residents, and    CULTURE, BLOOD 1   CULTURE, BLOOD 1   LACTATE, SEPSIS   TROPONIN   BRAIN NATRIURETIC PEPTIDE   TROPONIN   C-REACTIVE PROTEIN   SEDIMENTATION RATE         CDU IMPRESSION / PLAN      Mervat Esposito is a 64 y.o. female who presents with chest discomfort.    Patient evaluated by cardiology.  No further cardiac workup required.  Symptoms consistent with pleuritic chest pain after bronchitis.  Patient with cough.  Patient resolving after antibiotic course.  No further purulent sputum.  Patient had spine abnormality on CT scan possibly consistent with discitis.  Further workup with MRI.  Admitted for advanced imaging and neurosurgical workup if positive  Symptoms improved.    Continue home medications and pain control  Monitor vitals, labs, and imaging  DISPO: pending consults and clinical improvement    CONSULTS:    IP CONSULT TO NEUROSURGERY  IP CONSULT TO CARDIOLOGY  IP CONSULT TO IV TEAM    PROCEDURES:  Not indicated        PATIENT REFERRED TO:    No follow-up provider specified.    --  Timothy Garcia MD   Observation Physician    This dictation was generated by voice recognition computer software.  Although all attempts are made to edit the dictation for accuracy, there may be errors in the transcription that are not intended.

## 2024-04-24 NOTE — ED PROVIDER NOTES
Faculty Sign-Out Attestation  Handoff taken on the following patient from prior Attending Physician: Jayden  Note Started: 11:47 PM EDT    I was available and discussed any additional care issues that arose and coordinated the management plans with the resident(s) caring for the patient during my duty period. Any areas of disagreement with resident’s documentation of care or procedures are noted on the chart. I was personally present for the key portions of any/all procedures during my duty period. I have documented in the chart those procedures where I was not present during the key portions.    Cp, ct ro pe pending, if negative   & trop -, may discharge    Ct no pe, trop 11, observation admit     Manny Licona, DO  04/24/24 5128

## 2024-04-24 NOTE — ED NOTES
ED to inpatient nurses report      Chief Complaint:  Chief Complaint   Patient presents with    Chest Pain    Shortness of Breath     Present to ED from: Home     MOA:     LOC: alert and orientated to name, place, date  Mobility: Independent  Oxygen Baseline: Ra    Current needs required: none   Pending ED orders: none  Present condition: stable     Why did the patient come to the ED? Pt arrived to ED for chest pain and sweating over the last few days   Pt describes the chest pain is a heavy pressure feeling   Pt states that she took a nitro at home and she had some relief   Pt denies being on any blood thinners   Pt has hx of htn and states she has a heart murmur   Pt states that she has had SOB over the last year but that it has become worse   Pt denies having any vision chages   Breathing is non labored and no acute distress is noted.   Pt resting on stretcher, call light within reach.white board updated   What is the plan? Admit for chest pain   Any procedures or intervention occur? Labs, CT   Any safety concerns??    Mental Status:  Level of Consciousness: Alert (0)    Psych Assessment:   Psychosocial  Psychosocial (WDL): Within Defined Limits  Vital signs   Vitals:    04/23/24 2203 04/23/24 2218 04/23/24 2233 04/23/24 2248   BP: (!) 83/59 108/67 114/69 (!) 103/54   Pulse: 81 (!) 107 (!) 101 80   Resp: 20 (!) 38 20 22   Temp:       TempSrc:       SpO2: 93%  90% 93%   Weight:       Height:            Vitals:  Patient Vitals for the past 24 hrs:   BP Temp Temp src Pulse Resp SpO2 Height Weight   04/23/24 2248 (!) 103/54 -- -- 80 22 93 % -- --   04/23/24 2233 114/69 -- -- (!) 101 20 90 % -- --   04/23/24 2218 108/67 -- -- (!) 107 (!) 38 -- -- --   04/23/24 2203 (!) 83/59 -- -- 81 20 93 % -- --   04/23/24 2148 106/64 -- -- 98 14 93 % -- --   04/23/24 2133 (!) 108/58 -- -- 93 (!) 31 94 % -- --   04/23/24 2118 117/72 -- -- 95 26 93 % -- --   04/23/24 2109 -- -- -- -- -- -- 1.727 m (5' 8\") 122.5 kg (270 lb)   04/23/24  tobacco: Never   Vaping Use    Vaping Use: Unknown   Substance and Sexual Activity    Alcohol use: Not Currently     Alcohol/week: 6.0 standard drinks of alcohol     Types: 6 Cans of beer per week     Comment: last used alcohol 6/12/2016    Drug use: Not Currently     Comment: reports 4 months sober    Sexual activity: Never     Partners: Male       FAMILY HISTORY       Family History   Problem Relation Age of Onset    Diabetes Mother     Heart Disease Father     Mental Illness Father        ALLERGIES     Codeine and Morphine    CURRENT MEDICATIONS       Previous Medications    ACETAMINOPHEN (TYLENOL) 325 MG TABLET    Take 650 mg by mouth every 6 hours as needed for Pain    ALBUTEROL SULFATE  (90 BASE) MCG/ACT INHALER    Inhale 1 puff into the lungs every 6 hours as needed for Wheezing 12/03/19 medication list Pt has inhaler with her (locked in medication cabinet envelope # [id number])    ALUMINUM-MAGNESIUM HYDROXIDE 200-200 MG/5ML SUSPENSION    Take by mouth as needed for Indigestion    ASCORBIC ACID (VITAMIN C) 500 MG TABLET    Take 500 mg by mouth daily    BISACODYL (DULCOLAX) 10 MG SUPPOSITORY    Place 10 mg rectally daily as needed for Constipation    CEPHALEXIN (KEFLEX) 500 MG CAPSULE    Take 500 mg by mouth 3 times daily    CYCLOBENZAPRINE (FLEXERIL) 10 MG TABLET    Take 10 mg by mouth 3 times daily as needed for Muscle spasms    DOCUSATE SODIUM (COLACE) 100 MG CAPSULE    Take 100 mg by mouth 2 times daily    ENOXAPARIN (LOVENOX) 100 MG/ML INJECTION    Inject into the skin 2 times daily    FERROUS GLUCONATE (FERGON) 324 (38 FE) MG TABLET    Take 324 mg by mouth daily (with breakfast)    FLUOXETINE (PROZAC) 20 MG CAPSULE    Take 40 mg by mouth daily    GABAPENTIN (NEURONTIN) 400 MG CAPSULE    Take 2 capsules by mouth 3 times daily for 15 days.    GLECAPREVIR-PIBRENTASVIR (MAVYRET PO)    Take by mouth    HYDROXYZINE (VISTARIL) 50 MG CAPSULE    Take 50 mg by mouth 3 times daily as needed for Itching

## 2024-04-24 NOTE — CARE COORDINATION
[employer]  Agency Booking    Confirmation #:  [id number]  Customer:  Wing Crowell  Phone Number:  [phone]  Voucher Number:  [email or web address]        Trip Date/Time:  4/24/2024 6:45:00 PM        Pickup Address:  [address] [place] [address]  Drop Off Address::  [address] [place] [address]        Misc. Ride Notes:  Please  in Emergency Lot  Return Ride Ordered:  Ordered By:  Lala Atkinson

## 2024-04-24 NOTE — CONSULTS
gait disturbance, No weakness or joint complaints.  Integumentary: No rash or pruritis.  Neurological: No headache, diplopia, change in muscle strength, numbness or tingling. No change in gait, balance, coordination, mood, affect, memory, mentation, behavior.  Psychiatric: No anxiety, or depression.  Endocrine: No temperature intolerance. No excessive thirst, fluid intake, or urination. No tremor.  Hematologic/Lymphatic: No abnormal bruising or bleeding, blood clots or swollen lymph nodes.  Allergic/Immunologic: No nasal congestion or hives.      PHYSICAL EXAM:      /79   Pulse 70   Temp 97.7 °F (36.5 °C) (Oral)   Resp 18   Ht 1.727 m (5' 8\")   Wt 122.5 kg (270 lb)   SpO2 96%   BMI 41.05 kg/m²    Constitutional and General Appearance: alert, cooperative, no distress and appears stated age  HEENT: PERRL, no cervical lymphadenopathy. No masses palpable. Normal oral mucosa  Respiratory:  Normal excursion and expansion without use of accessory muscles  Resp Auscultation: Good respiratory effort. No for increased work of breathing. On auscultation: clear to auscultation bilaterally  Cardiovascular:  The apical impulse is not displaced  Heart tones are crisp and normal. regular S1 and S2.  Jugular venous pulsation Normal  The carotid upstroke is normal in amplitude and contour without delay or bruit  Peripheral pulses are symmetrical and full   Abdomen:   No masses or tenderness  Bowel sounds present  Extremities:   No Cyanosis or Clubbing   Lower extremity edema: No   Skin: Warm and dry  Neurological:  Alert and oriented.  Moves all extremities well  No abnormalities of mood, affect, memory, mentation, or behavior are noted    Labs:     CBC:   Recent Labs     04/23/24 2122   WBC 11.9*   HGB 12.6   HCT 38.2   PLT See Reflexed IPF Result     BMP:   Recent Labs     04/23/24 2122      K 3.8   CO2 21   BUN 23   CREATININE 0.9   LABGLOM 76   GLUCOSE 233*     BNP: No results for input(s): \"BNP\" in the last  [facility]  [facility]  [phone]

## 2024-04-24 NOTE — ED PROVIDER NOTES
[facility] ED  Emergency Department Encounter  Emergency Medicine Resident     Pt Name:Mervat Esposito  MRN: [id number]  Birthdate 1960  Date of evaluation: 4/23/24  PCP:  Francisca Avalos PA  Note Started: 9:02 PM EDT      CHIEF COMPLAINT       Chief Complaint   Patient presents with    Chest Pain    Shortness of Breath       HISTORY OF PRESENT ILLNESS  (Location/Symptom, Timing/Onset, Context/Setting, Quality, Duration, Modifying Factors, Severity.)      Mervat Esposito is a 64 y.o. female history of hypertension, diabetes, schizoaffective, high cholesterol, methamphetamine abuse, IV drug abuse, who presents with midsternal chest pains been ongoing for the past day.  States the pain was exacerbated after eating food.  She took 1 nitro that is prescribed for her and did resolve some of the pain.  Describes as midsternal pressure sensation radiating down the left arm.  Associated with diaphoresis and nausea but no vomiting.  She also having right upper quadrant abdominal pain that she has been dealing with around the same time.  No prior abdominal surgeries.  She is seen by her primary care office back in February for similar symptoms.  States she recently was on antibiotic course for bronchitis. On chart review she also has aortic valve stenosis.  PAST MEDICAL / SURGICAL / SOCIAL / FAMILY HISTORY      has a past medical history of Alcohol abuse, Anxiety, Chronic low back pain, Depression, Hepatitis C, Hypertension, MDD (major depressive disorder), recurrent severe, without psychosis (HCC), Morbid obesity with BMI of 40.0-44.9, adult (HCC), Schizoaffective disorder (HCC), and Suicide attempt by polypharm overdose (HCC).       has a past surgical history that includes Tubal ligation; Tonsillectomy; Ankle surgery; cyst removal; Pilonidal cyst excision; back surgery (nov 2013); Dialysis fistula creation (Right, 2/11/2020); and CT BIOPSY DEEP BONE PERCUTANEOUS (4/2/2014).      Social History  today

## 2024-04-24 NOTE — PLAN OF CARE
Problem: Discharge Planning  Goal: Discharge to home or other facility with appropriate resources  4/24/2024 1820 by Milla Singh RN  Outcome: Completed  4/24/2024 1820 by Milla Singh RN  Outcome: Progressing  4/24/2024 1527 by Milla Singh RN  Outcome: Progressing  4/24/2024 0540 by Lilain Hayes RN  Outcome: Progressing     Problem: Safety - Adult  Goal: Free from fall injury  4/24/2024 1820 by Milla Singh RN  Outcome: Completed  4/24/2024 1820 by Milla Singh RN  Outcome: Progressing  4/24/2024 1527 by Milla Singh RN  Outcome: Progressing  4/24/2024 0540 by Lilian Hayes RN  Outcome: Progressing     Problem: Pain  Goal: Verbalizes/displays adequate comfort level or baseline comfort level  4/24/2024 1820 by Milla Singh RN  Outcome: Completed  4/24/2024 1820 by Milla Singh RN  Outcome: Progressing  4/24/2024 1527 by Milla Singh RN  Outcome: Progressing  4/24/2024 0540 by Lilian Hayes RN  Outcome: Progressing

## 2024-04-24 NOTE — CONSULTS
Department of Neurosurgery Consult Note      Reason for Consult: Concern for discitis  Requesting Physician: Dr. Licona  Neurosurgeon:   [] Dr. Dalton  [] Dr. Guo  [x] Dr. Zarate  [] Dr. Hernandez    Neurosurgery arrival to bedside 2 AM    History Obtained From:  patient, electronic medical record    CHIEF COMPLAINT:         Chief Complaint   Patient presents with    Chest Pain    Shortness of Breath       HISTORY OF PRESENT ILLNESS:       The patient is a 64 y.o. female with a history of hypertension, type 2 diabetes, schizoaffective disorder, hyperlipidemia, lumbar spinal stenosis, methamphetamine abuse and IV drug abuse last used in January who presents with chest pain and shortness of breath.  Patient has chronic back pain has had undergone prior surgeries includeright L4 and L5 hemilaminectomies, with interbody  spacer fusion at L4-L5 and L5-S1, and right-sided pedicle screw posterior fixation from L4-S1.  Patient had a CT scan that was concerning for possible discitis.  Blood cultures inflammatory markers and MRI are ordered.  Patient admitted to observation unit for evaluation by cardiology.    PAST MEDICAL HISTORY :       Past Medical History:        Diagnosis Date    Alcohol abuse 6/13/2016    Anxiety     Chronic low back pain     Depression     Hepatitis C     Hypertension     MDD (major depressive disorder), recurrent severe, without psychosis (HCC) 1/1/2020    Morbid obesity with BMI of 40.0-44.9, adult (HCC)     Schizoaffective disorder (HCC)     Suicide attempt by polypharm overdose (HCC) 5/24/2016       Past Surgical History:        Procedure Laterality Date    ANKLE SURGERY      BACK SURGERY  nov 2013    at [place]    CT BIOPSY PERCUTANEOUS DEEP BONE  4/2/2014    CT BIOPSY PERCUTANEOUS DEEP BONE    CYST REMOVAL      off end of tailbone    DIALYSIS FISTULA CREATION Right 2/11/2020    RIGHT WRIST EXPLORATION, FOREIGN BODY REMOVAL performed by Manuel Etienne MD at [facility] OR    PILONIDAL CYST EXCISION

## 2024-04-24 NOTE — ED NOTES
Pt arrived to ED for chest pain and sweating over the last few days   Pt describes the chest pain is a heavy pressure feeling   Pt states that she took a nitro at home and she had some relief   Pt denies being on any blood thinners   Pt has hx of htn and states she has a heart murmur   Pt states that she has had SOB over the last year but that it has become worse   Pt denies having any vision chages   Breathing is non labored and no acute distress is noted.   Pt resting on stretcher, call light within reach.white board updated

## 2024-04-24 NOTE — ED PROVIDER NOTES
[facility]     Emergency Department     Faculty Attestation    I performed a history and physical examination of the patient and discussed management with the resident. I reviewed the resident’s note and agree with the documented findings and plan of care. Any areas of disagreement are noted on the chart. I was personally present for the key portions of any procedures. I have documented in the chart those procedures where I was not present during the key portions. I have reviewed the emergency nurses triage note. I agree with the chief complaint, past medical history, past surgical history, allergies, medications, social and family history as documented unless otherwise noted below.    For Physician Assistant/ Nurse Practitioner cases/documentation I have personally evaluated this patient and have completed at least one if not all key elements of the E/M (history, physical exam, and MDM). Additional findings are as noted.      Primary Care Physician:  Francisca Avalos PA    CHIEF COMPLAINT       Chief Complaint   Patient presents with    Chest Pain    Shortness of Breath       RECENT VITALS:   Temp: 97.5 °F (36.4 °C),  Pulse: 80, Respirations: 22, BP: (!) 103/54    LABS:  Labs Reviewed   CBC WITH AUTO DIFFERENTIAL - Abnormal; Notable for the following components:       Result Value    WBC 11.9 (*)     Lymphocytes Absolute 3.75 (*)     All other components within normal limits   COMPREHENSIVE METABOLIC PANEL W/ REFLEX TO MG FOR LOW K - Abnormal; Notable for the following components:    Glucose 233 (*)     Alkaline Phosphatase 110 (*)     All other components within normal limits   LACTATE, SEPSIS - Abnormal; Notable for the following components:    Lactic Acid, Sepsis, Whole Blood 2.9 (*)     All other components within normal limits   D-DIMER, QUANTITATIVE - Abnormal; Notable for the following components:    D-Dimer, Quant 0.65 (*)     All other components within normal limits   CULTURE,

## 2024-04-25 LAB
EKG ATRIAL RATE: 94 BPM
EKG P AXIS: 19 DEGREES
EKG P-R INTERVAL: 162 MS
EKG Q-T INTERVAL: 364 MS
EKG QRS DURATION: 78 MS
EKG QTC CALCULATION (BAZETT): 455 MS
EKG R AXIS: 45 DEGREES
EKG T AXIS: 51 DEGREES
EKG VENTRICULAR RATE: 94 BPM

## 2024-04-25 PROCEDURE — 93010 ELECTROCARDIOGRAM REPORT: CPT | Performed by: INTERNAL MEDICINE

## 2024-04-25 NOTE — PROGRESS NOTES
[facility]  CDU / OBSERVATION ENCOUNTER  ATTENDING NOTE       Symptomatically better.  Patient was with MRIs not showing evidence of discitis.  Patient has not requiring further workup.  Patient had no significant symptoms requiring further treatment.  Patient with negative advanced imaging.  Acceptable for discharge and outpatient follow-up    Timothy Garcia MD  Attending Emergency  Physician

## 2024-04-28 LAB
MICROORGANISM SPEC CULT: NORMAL
SERVICE CMNT-IMP: NORMAL
SPECIMEN DESCRIPTION: NORMAL

## 2024-04-29 LAB
MICROORGANISM SPEC CULT: NORMAL
SERVICE CMNT-IMP: NORMAL
SPECIMEN DESCRIPTION: NORMAL

## 2024-06-13 ENCOUNTER — HOSPITAL ENCOUNTER (EMERGENCY)
Age: 64
Discharge: PSYCHIATRIC OR SUBSTANCE USE FACILITY | End: 2024-06-14
Attending: EMERGENCY MEDICINE
Payer: MEDICAID

## 2024-06-13 ENCOUNTER — APPOINTMENT (OUTPATIENT)
Dept: GENERAL RADIOLOGY | Age: 64
End: 2024-06-13
Payer: MEDICAID

## 2024-06-13 DIAGNOSIS — R07.9 CHEST PAIN, UNSPECIFIED TYPE: Primary | ICD-10-CM

## 2024-06-13 DIAGNOSIS — R45.851 SUICIDAL IDEATIONS: ICD-10-CM

## 2024-06-13 PROBLEM — E13.65 OTHER SPECIFIED DIABETES MELLITUS WITH HYPERGLYCEMIA (HCC): Status: ACTIVE | Noted: 2024-06-13

## 2024-06-13 LAB
ALBUMIN SERPL BCG-MCNC: 3.8 G/DL (ref 3.5–5.1)
ALP SERPL-CCNC: 100 U/L (ref 38–126)
ALT SERPL W/O P-5'-P-CCNC: 12 U/L (ref 11–66)
AMPHETAMINES UR QL SCN: NEGATIVE
ANION GAP SERPL CALC-SCNC: 14 MEQ/L (ref 8–16)
APAP SERPL-MCNC: < 5 UG/ML (ref 0–20)
AST SERPL-CCNC: 13 U/L (ref 5–40)
BARBITURATES UR QL SCN: NEGATIVE
BENZODIAZ UR QL SCN: NEGATIVE
BILIRUB CONJ SERPL-MCNC: < 0.1 MG/DL (ref 0.1–13.8)
BILIRUB SERPL-MCNC: 0.2 MG/DL (ref 0.3–1.2)
BUN SERPL-MCNC: 18 MG/DL (ref 7–22)
BZE UR QL SCN: NEGATIVE
CALCIUM SERPL-MCNC: 8.6 MG/DL (ref 8.5–10.5)
CANNABINOIDS UR QL SCN: POSITIVE
CHLORIDE SERPL-SCNC: 98 MEQ/L (ref 98–111)
CO2 SERPL-SCNC: 22 MEQ/L (ref 23–33)
CREAT SERPL-MCNC: 0.8 MG/DL (ref 0.4–1.2)
DEPRECATED RDW RBC AUTO: 39.5 FL (ref 35–45)
ERYTHROCYTE [DISTWIDTH] IN BLOOD BY AUTOMATED COUNT: 12.3 % (ref 11.5–14.5)
ETHANOL SERPL-MCNC: < 0.01 % (ref 0–0.08)
FENTANYL: NEGATIVE
GFR SERPL CREATININE-BSD FRML MDRD: 83 ML/MIN/1.73M2
GLUCOSE BLD STRIP.AUTO-MCNC: 308 MG/DL (ref 70–108)
GLUCOSE BLD STRIP.AUTO-MCNC: 388 MG/DL (ref 70–108)
GLUCOSE BLD STRIP.AUTO-MCNC: 429 MG/DL (ref 70–108)
GLUCOSE SERPL-MCNC: 410 MG/DL (ref 70–108)
HCT VFR BLD AUTO: 35.1 % (ref 37–47)
HGB BLD-MCNC: 12 GM/DL (ref 12–16)
LIPASE SERPL-CCNC: 15.1 U/L (ref 5.6–51.3)
MCH RBC QN AUTO: 29.7 PG (ref 26–33)
MCHC RBC AUTO-ENTMCNC: 34.2 GM/DL (ref 32.2–35.5)
MCV RBC AUTO: 86.9 FL (ref 81–99)
OPIATES UR QL SCN: NEGATIVE
OSMOLALITY SERPL CALC.SUM OF ELEC: 287.4 MOSMOL/KG (ref 275–300)
OXYCODONE: NEGATIVE
PCP UR QL SCN: NEGATIVE
PLATELET # BLD AUTO: 305 THOU/MM3 (ref 130–400)
PMV BLD AUTO: 11.4 FL (ref 9.4–12.4)
POTASSIUM SERPL-SCNC: 3.9 MEQ/L (ref 3.5–5.2)
PROT SERPL-MCNC: 6.5 G/DL (ref 6.1–8)
RBC # BLD AUTO: 4.04 MILL/MM3 (ref 4.2–5.4)
SALICYLATES SERPL-MCNC: < 0.3 MG/DL (ref 2–10)
SODIUM SERPL-SCNC: 134 MEQ/L (ref 135–145)
TROPONIN, HIGH SENSITIVITY: 9 NG/L (ref 0–12)
WBC # BLD AUTO: 9.5 THOU/MM3 (ref 4.8–10.8)

## 2024-06-13 PROCEDURE — 36415 COLL VENOUS BLD VENIPUNCTURE: CPT

## 2024-06-13 PROCEDURE — 87636 SARSCOV2 & INF A&B AMP PRB: CPT

## 2024-06-13 PROCEDURE — 80076 HEPATIC FUNCTION PANEL: CPT

## 2024-06-13 PROCEDURE — 80048 BASIC METABOLIC PNL TOTAL CA: CPT

## 2024-06-13 PROCEDURE — 80307 DRUG TEST PRSMV CHEM ANLYZR: CPT

## 2024-06-13 PROCEDURE — 96374 THER/PROPH/DIAG INJ IV PUSH: CPT

## 2024-06-13 PROCEDURE — 83690 ASSAY OF LIPASE: CPT

## 2024-06-13 PROCEDURE — 85027 COMPLETE CBC AUTOMATED: CPT

## 2024-06-13 PROCEDURE — 82077 ASSAY SPEC XCP UR&BREATH IA: CPT

## 2024-06-13 PROCEDURE — 80179 DRUG ASSAY SALICYLATE: CPT

## 2024-06-13 PROCEDURE — 6370000000 HC RX 637 (ALT 250 FOR IP): Performed by: STUDENT IN AN ORGANIZED HEALTH CARE EDUCATION/TRAINING PROGRAM

## 2024-06-13 PROCEDURE — 84484 ASSAY OF TROPONIN QUANT: CPT

## 2024-06-13 PROCEDURE — 99285 EMERGENCY DEPT VISIT HI MDM: CPT

## 2024-06-13 PROCEDURE — 93005 ELECTROCARDIOGRAM TRACING: CPT | Performed by: STUDENT IN AN ORGANIZED HEALTH CARE EDUCATION/TRAINING PROGRAM

## 2024-06-13 PROCEDURE — 80143 DRUG ASSAY ACETAMINOPHEN: CPT

## 2024-06-13 PROCEDURE — 96376 TX/PRO/DX INJ SAME DRUG ADON: CPT

## 2024-06-13 PROCEDURE — 71045 X-RAY EXAM CHEST 1 VIEW: CPT

## 2024-06-13 PROCEDURE — 82948 REAGENT STRIP/BLOOD GLUCOSE: CPT

## 2024-06-13 RX ADMIN — Medication 5 UNITS: at 22:48

## 2024-06-13 ASSESSMENT — LIFESTYLE VARIABLES
HOW OFTEN DO YOU HAVE A DRINK CONTAINING ALCOHOL: NEVER
HOW MANY STANDARD DRINKS CONTAINING ALCOHOL DO YOU HAVE ON A TYPICAL DAY: PATIENT DOES NOT DRINK

## 2024-06-13 ASSESSMENT — PATIENT HEALTH QUESTIONNAIRE - PHQ9
10. IF YOU CHECKED OFF ANY PROBLEMS, HOW DIFFICULT HAVE THESE PROBLEMS MADE IT FOR YOU TO DO YOUR WORK, TAKE CARE OF THINGS AT HOME, OR GET ALONG WITH OTHER PEOPLE: EXTREMELY DIFFICULT
9. THOUGHTS THAT YOU WOULD BE BETTER OFF DEAD, OR OF HURTING YOURSELF: NEARLY EVERY DAY
SUM OF ALL RESPONSES TO PHQ QUESTIONS 1-9: 21
2. FEELING DOWN, DEPRESSED OR HOPELESS: NEARLY EVERY DAY
SUM OF ALL RESPONSES TO PHQ9 QUESTIONS 1 & 2: 6
1. LITTLE INTEREST OR PLEASURE IN DOING THINGS: NEARLY EVERY DAY
7. TROUBLE CONCENTRATING ON THINGS, SUCH AS READING THE NEWSPAPER OR WATCHING TELEVISION: NEARLY EVERY DAY
8. MOVING OR SPEAKING SO SLOWLY THAT OTHER PEOPLE COULD HAVE NOTICED. OR THE OPPOSITE, BEING SO FIGETY OR RESTLESS THAT YOU HAVE BEEN MOVING AROUND A LOT MORE THAN USUAL: NEARLY EVERY DAY
SUM OF ALL RESPONSES TO PHQ QUESTIONS 1-9: 24
4. FEELING TIRED OR HAVING LITTLE ENERGY: NEARLY EVERY DAY
SUM OF ALL RESPONSES TO PHQ QUESTIONS 1-9: 24
3. TROUBLE FALLING OR STAYING ASLEEP: NOT AT ALL
SUM OF ALL RESPONSES TO PHQ QUESTIONS 1-9: 24
5. POOR APPETITE OR OVEREATING: NEARLY EVERY DAY
6. FEELING BAD ABOUT YOURSELF - OR THAT YOU ARE A FAILURE OR HAVE LET YOURSELF OR YOUR FAMILY DOWN: NEARLY EVERY DAY

## 2024-06-13 ASSESSMENT — PAIN DESCRIPTION - LOCATION: LOCATION: CHEST

## 2024-06-13 ASSESSMENT — SLEEP AND FATIGUE QUESTIONNAIRES
DO YOU HAVE DIFFICULTY SLEEPING: NO
AVERAGE NUMBER OF SLEEP HOURS: 6
DO YOU USE A SLEEP AID: YES

## 2024-06-13 ASSESSMENT — PAIN - FUNCTIONAL ASSESSMENT: PAIN_FUNCTIONAL_ASSESSMENT: 0-10

## 2024-06-13 ASSESSMENT — HEART SCORE: ECG: NORMAL
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
## 2024-06-13 NOTE — ED NOTES
Level A paged. Pt changed to high risk due to pt stating she has been thinking of acting on her suicidal thoughts.

## 2024-06-13 NOTE — ED NOTES
Pt reports she thought about not coming to hospital. She reports if she was having a heart attack she could have stayed home and . Pt reports she has been having thoughts and knows \"multiple\" ways to harm herself. She reports she has been \"losing time\". Reports it has been happening on a off. States she sometimes does not remember how she got from point a to point b which has made her more suicidal.

## 2024-06-14 ENCOUNTER — HOSPITAL ENCOUNTER (INPATIENT)
Age: 64
LOS: 6 days | Discharge: HOME | DRG: 750 | End: 2024-06-20
Attending: PSYCHIATRY & NEUROLOGY | Admitting: PSYCHIATRY & NEUROLOGY
Payer: MEDICAID

## 2024-06-14 VITALS
OXYGEN SATURATION: 98 % | RESPIRATION RATE: 15 BRPM | BODY MASS INDEX: 44.09 KG/M2 | DIASTOLIC BLOOD PRESSURE: 76 MMHG | SYSTOLIC BLOOD PRESSURE: 113 MMHG | TEMPERATURE: 97.6 F | HEART RATE: 65 BPM | WEIGHT: 290 LBS

## 2024-06-14 PROBLEM — F32.A DEPRESSION WITH SUICIDAL IDEATION: Status: ACTIVE | Noted: 2024-06-14

## 2024-06-14 PROBLEM — R45.851 DEPRESSION WITH SUICIDAL IDEATION: Status: ACTIVE | Noted: 2024-06-14

## 2024-06-14 LAB
EKG ATRIAL RATE: 81 BPM
EKG P AXIS: 19 DEGREES
EKG P-R INTERVAL: 168 MS
EKG Q-T INTERVAL: 398 MS
EKG QRS DURATION: 80 MS
EKG QTC CALCULATION (BAZETT): 462 MS
EKG R AXIS: 61 DEGREES
EKG T AXIS: 64 DEGREES
EKG VENTRICULAR RATE: 81 BPM
FLUAV RNA RESP QL NAA+PROBE: NOT DETECTED
FLUBV RNA RESP QL NAA+PROBE: NOT DETECTED
GLUCOSE BLD STRIP.AUTO-MCNC: 170 MG/DL (ref 70–108)
GLUCOSE BLD STRIP.AUTO-MCNC: 240 MG/DL (ref 70–108)
GLUCOSE BLD STRIP.AUTO-MCNC: 294 MG/DL (ref 70–108)
SARS-COV-2 RNA RESP QL NAA+PROBE: NOT DETECTED

## 2024-06-14 PROCEDURE — 99223 1ST HOSP IP/OBS HIGH 75: CPT | Performed by: NURSE PRACTITIONER

## 2024-06-14 PROCEDURE — 82947 ASSAY GLUCOSE BLOOD QUANT: CPT

## 2024-06-14 PROCEDURE — 1240000000 HC EMOTIONAL WELLNESS R&B

## 2024-06-14 PROCEDURE — 6370000000 HC RX 637 (ALT 250 FOR IP): Performed by: EMERGENCY MEDICINE

## 2024-06-14 PROCEDURE — 93010 ELECTROCARDIOGRAM REPORT: CPT | Performed by: INTERNAL MEDICINE

## 2024-06-14 PROCEDURE — 6370000000 HC RX 637 (ALT 250 FOR IP): Performed by: NURSE PRACTITIONER

## 2024-06-14 PROCEDURE — 6370000000 HC RX 637 (ALT 250 FOR IP): Performed by: INTERNAL MEDICINE

## 2024-06-14 PROCEDURE — 82948 REAGENT STRIP/BLOOD GLUCOSE: CPT

## 2024-06-14 PROCEDURE — 96376 TX/PRO/DX INJ SAME DRUG ADON: CPT

## 2024-06-14 RX ORDER — OLANZAPINE 5 MG/1
5 TABLET ORAL EVERY 4 HOURS PRN
Status: DISCONTINUED | OUTPATIENT
Start: 2024-06-14 | End: 2024-06-20 | Stop reason: HOSPADM

## 2024-06-14 RX ORDER — QUETIAPINE FUMARATE 100 MG/1
100 TABLET, FILM COATED ORAL NIGHTLY
Status: ON HOLD | COMMUNITY
Start: 2019-08-28 | End: 2024-06-19

## 2024-06-14 RX ORDER — ARIPIPRAZOLE 15 MG/1
15 TABLET ORAL DAILY
Status: ON HOLD | COMMUNITY
Start: 2024-06-09 | End: 2024-06-19 | Stop reason: HOSPADM

## 2024-06-14 RX ORDER — CLONAZEPAM 0.5 MG/1
0.5 TABLET ORAL 2 TIMES DAILY PRN
Status: DISCONTINUED | OUTPATIENT
Start: 2024-06-14 | End: 2024-06-20 | Stop reason: HOSPADM

## 2024-06-14 RX ORDER — ATORVASTATIN CALCIUM 20 MG/1
20 TABLET, FILM COATED ORAL DAILY
Status: DISCONTINUED | OUTPATIENT
Start: 2024-06-14 | End: 2024-06-20 | Stop reason: HOSPADM

## 2024-06-14 RX ORDER — SERTRALINE HYDROCHLORIDE 100 MG/1
100 TABLET, FILM COATED ORAL DAILY
Status: DISCONTINUED | OUTPATIENT
Start: 2024-06-14 | End: 2024-06-15

## 2024-06-14 RX ORDER — CLONAZEPAM 0.5 MG/1
0.5 TABLET ORAL 2 TIMES DAILY PRN
Status: ON HOLD | COMMUNITY
Start: 2024-06-09 | End: 2024-06-19 | Stop reason: HOSPADM

## 2024-06-14 RX ORDER — ALBUTEROL SULFATE 90 UG/1
1 AEROSOL, METERED RESPIRATORY (INHALATION) EVERY 6 HOURS PRN
Status: DISCONTINUED | OUTPATIENT
Start: 2024-06-14 | End: 2024-06-20 | Stop reason: HOSPADM

## 2024-06-14 RX ORDER — FUROSEMIDE 20 MG/1
20 TABLET ORAL DAILY
Status: DISCONTINUED | OUTPATIENT
Start: 2024-06-14 | End: 2024-06-20 | Stop reason: HOSPADM

## 2024-06-14 RX ORDER — M-VIT,TX,IRON,MINS/CALC/FOLIC 27MG-0.4MG
1 TABLET ORAL DAILY
Status: DISCONTINUED | OUTPATIENT
Start: 2024-06-14 | End: 2024-06-20 | Stop reason: HOSPADM

## 2024-06-14 RX ORDER — ATORVASTATIN CALCIUM 20 MG/1
20 TABLET, FILM COATED ORAL DAILY
Status: ON HOLD | COMMUNITY
Start: 2024-06-06 | End: 2024-06-19

## 2024-06-14 RX ORDER — ASCORBIC ACID 500 MG
500 TABLET ORAL DAILY
Status: DISCONTINUED | OUTPATIENT
Start: 2024-06-14 | End: 2024-06-20 | Stop reason: HOSPADM

## 2024-06-14 RX ORDER — BUDESONIDE AND FORMOTEROL FUMARATE DIHYDRATE 160; 4.5 UG/1; UG/1
2 AEROSOL RESPIRATORY (INHALATION) 2 TIMES DAILY
COMMUNITY

## 2024-06-14 RX ORDER — VITAMIN B COMPLEX
1000 TABLET ORAL DAILY
Status: DISCONTINUED | OUTPATIENT
Start: 2024-06-14 | End: 2024-06-20 | Stop reason: HOSPADM

## 2024-06-14 RX ORDER — ACETAMINOPHEN 325 MG/1
650 TABLET ORAL EVERY 4 HOURS PRN
Status: DISCONTINUED | OUTPATIENT
Start: 2024-06-14 | End: 2024-06-20 | Stop reason: HOSPADM

## 2024-06-14 RX ORDER — HYDROXYZINE 50 MG/1
50 TABLET, FILM COATED ORAL 3 TIMES DAILY PRN
Status: DISCONTINUED | OUTPATIENT
Start: 2024-06-14 | End: 2024-06-20 | Stop reason: HOSPADM

## 2024-06-14 RX ORDER — BUDESONIDE AND FORMOTEROL FUMARATE DIHYDRATE 160; 4.5 UG/1; UG/1
2 AEROSOL RESPIRATORY (INHALATION) 2 TIMES DAILY
Status: DISCONTINUED | OUTPATIENT
Start: 2024-06-14 | End: 2024-06-20 | Stop reason: HOSPADM

## 2024-06-14 RX ORDER — ARIPIPRAZOLE 15 MG/1
15 TABLET ORAL DAILY
Status: DISCONTINUED | OUTPATIENT
Start: 2024-06-14 | End: 2024-06-17

## 2024-06-14 RX ORDER — QUETIAPINE FUMARATE 100 MG/1
100 TABLET, FILM COATED ORAL NIGHTLY
Status: DISCONTINUED | OUTPATIENT
Start: 2024-06-14 | End: 2024-06-20 | Stop reason: HOSPADM

## 2024-06-14 RX ORDER — GABAPENTIN 400 MG/1
400 CAPSULE ORAL 3 TIMES DAILY
Status: DISCONTINUED | OUTPATIENT
Start: 2024-06-14 | End: 2024-06-20 | Stop reason: HOSPADM

## 2024-06-14 RX ORDER — INSULIN LISPRO 100 [IU]/ML
0-4 INJECTION, SOLUTION INTRAVENOUS; SUBCUTANEOUS NIGHTLY
Status: DISCONTINUED | OUTPATIENT
Start: 2024-06-14 | End: 2024-06-20 | Stop reason: HOSPADM

## 2024-06-14 RX ORDER — INSULIN LISPRO 100 [IU]/ML
0-8 INJECTION, SOLUTION INTRAVENOUS; SUBCUTANEOUS
Status: DISCONTINUED | OUTPATIENT
Start: 2024-06-14 | End: 2024-06-20 | Stop reason: HOSPADM

## 2024-06-14 RX ORDER — NITROGLYCERIN 0.4 MG/1
0.4 TABLET SUBLINGUAL EVERY 5 MIN PRN
Status: ON HOLD | COMMUNITY
End: 2024-06-19 | Stop reason: HOSPADM

## 2024-06-14 RX ORDER — IBUPROFEN 400 MG/1
400 TABLET ORAL EVERY 6 HOURS PRN
Status: DISCONTINUED | OUTPATIENT
Start: 2024-06-14 | End: 2024-06-20 | Stop reason: HOSPADM

## 2024-06-14 RX ORDER — SERTRALINE HYDROCHLORIDE 100 MG/1
100 TABLET, FILM COATED ORAL DAILY
Status: ON HOLD | COMMUNITY
Start: 2023-05-01 | End: 2024-06-19 | Stop reason: HOSPADM

## 2024-06-14 RX ORDER — DEXTROSE MONOHYDRATE 100 MG/ML
INJECTION, SOLUTION INTRAVENOUS CONTINUOUS PRN
Status: DISCONTINUED | OUTPATIENT
Start: 2024-06-14 | End: 2024-06-20 | Stop reason: HOSPADM

## 2024-06-14 RX ORDER — POLYETHYLENE GLYCOL 3350 17 G/17G
17 POWDER, FOR SOLUTION ORAL DAILY PRN
Status: DISCONTINUED | OUTPATIENT
Start: 2024-06-14 | End: 2024-06-20 | Stop reason: HOSPADM

## 2024-06-14 RX ORDER — TRAZODONE HYDROCHLORIDE 50 MG/1
50 TABLET ORAL NIGHTLY PRN
Status: DISCONTINUED | OUTPATIENT
Start: 2024-06-14 | End: 2024-06-20 | Stop reason: HOSPADM

## 2024-06-14 RX ORDER — GLIPIZIDE 5 MG/1
5 TABLET ORAL
Status: DISCONTINUED | OUTPATIENT
Start: 2024-06-15 | End: 2024-06-20 | Stop reason: HOSPADM

## 2024-06-14 RX ORDER — GLIPIZIDE 5 MG/1
2.5 TABLET ORAL
Status: DISCONTINUED | OUTPATIENT
Start: 2024-06-15 | End: 2024-06-14

## 2024-06-14 RX ORDER — LISINOPRIL 5 MG/1
5 TABLET ORAL DAILY
Status: DISCONTINUED | OUTPATIENT
Start: 2024-06-14 | End: 2024-06-20 | Stop reason: HOSPADM

## 2024-06-14 RX ORDER — GABAPENTIN 400 MG/1
400 CAPSULE ORAL 3 TIMES DAILY
COMMUNITY

## 2024-06-14 RX ORDER — MAGNESIUM HYDROXIDE/ALUMINUM HYDROXICE/SIMETHICONE 120; 1200; 1200 MG/30ML; MG/30ML; MG/30ML
30 SUSPENSION ORAL EVERY 6 HOURS PRN
Status: DISCONTINUED | OUTPATIENT
Start: 2024-06-14 | End: 2024-06-20 | Stop reason: HOSPADM

## 2024-06-14 RX ADMIN — Medication 1 TABLET: at 18:55

## 2024-06-14 RX ADMIN — LISINOPRIL 5 MG: 5 TABLET ORAL at 18:55

## 2024-06-14 RX ADMIN — SERTRALINE 100 MG: 100 TABLET, FILM COATED ORAL at 18:55

## 2024-06-14 RX ADMIN — ATORVASTATIN CALCIUM 20 MG: 20 TABLET, FILM COATED ORAL at 18:55

## 2024-06-14 RX ADMIN — ARIPIPRAZOLE 15 MG: 15 TABLET ORAL at 18:55

## 2024-06-14 RX ADMIN — FUROSEMIDE 20 MG: 20 TABLET ORAL at 18:55

## 2024-06-14 RX ADMIN — METOPROLOL TARTRATE 25 MG: 25 TABLET, FILM COATED ORAL at 22:15

## 2024-06-14 RX ADMIN — BUDESONIDE AND FORMOTEROL FUMARATE DIHYDRATE 2 PUFF: 160; 4.5 AEROSOL RESPIRATORY (INHALATION) at 22:15

## 2024-06-14 RX ADMIN — QUETIAPINE FUMARATE 100 MG: 100 TABLET ORAL at 22:15

## 2024-06-14 RX ADMIN — INSULIN HUMAN 5 UNITS: 100 INJECTION, SOLUTION PARENTERAL at 01:10

## 2024-06-14 RX ADMIN — GABAPENTIN 400 MG: 400 CAPSULE ORAL at 18:55

## 2024-06-14 RX ADMIN — POTASSIUM BICARBONATE 50 MEQ: 782 TABLET, EFFERVESCENT ORAL at 01:08

## 2024-06-14 RX ADMIN — Medication 1000 UNITS: at 18:55

## 2024-06-14 ASSESSMENT — LIFESTYLE VARIABLES
HOW MANY STANDARD DRINKS CONTAINING ALCOHOL DO YOU HAVE ON A TYPICAL DAY: PATIENT DOES NOT DRINK
HOW OFTEN DO YOU HAVE A DRINK CONTAINING ALCOHOL: NEVER
HOW MANY STANDARD DRINKS CONTAINING ALCOHOL DO YOU HAVE ON A TYPICAL DAY: PATIENT DOES NOT DRINK
HOW OFTEN DO YOU HAVE A DRINK CONTAINING ALCOHOL: NEVER

## 2024-06-14 ASSESSMENT — SLEEP AND FATIGUE QUESTIONNAIRES
SLEEP PATTERN: DIFFICULTY FALLING ASLEEP
DO YOU USE A SLEEP AID: YES
AVERAGE NUMBER OF SLEEP HOURS: 6
DO YOU HAVE DIFFICULTY SLEEPING: NO

## 2024-06-14 ASSESSMENT — PAIN - FUNCTIONAL ASSESSMENT: PAIN_FUNCTIONAL_ASSESSMENT: NONE - DENIES PAIN

## 2024-06-14 ASSESSMENT — PAIN SCALES - GENERAL
PAINLEVEL_OUTOF10: 0
PAINLEVEL_OUTOF10: 0

## 2024-06-14 NOTE — PLAN OF CARE
Problem: Self Harm/Suicidality  Goal: Will have no self-injury during hospital stay  Description: INTERVENTIONS:  1.  Ensure constant observer at bedside with Q15M safety checks  2.  Maintain a safe environment  3.  Secure patient belongings  4.  Ensure family/visitors adhere to safety recommendations  5.  Ensure safety tray has been added to patient's diet order  6.  Every shift and PRN: Re-assess suicidal risk via Frequent Screener    Outcome: Not Progressing     Problem: Depression  Goal: Will be euthymic at discharge  Description: INTERVENTIONS:  1. Administer medication as ordered  2. Provide emotional support via 1:1 interaction with staff  3. Encourage involvement in milieu/groups/activities  4. Monitor for social isolation  Outcome: Not Progressing

## 2024-06-14 NOTE — ED NOTES
Pt resting in bed sleeping at this time. Breathing easy and unlabored. No distress noted. Will monitor

## 2024-06-14 NOTE — ED NOTES
Pt resting in bed on stomach. Voiced no concerns. Medicated per mat. Sitter at door. Will monitor

## 2024-06-14 NOTE — CARE COORDINATION
BHI Biopsychosocial Assessment    Current Level of Psychosocial Functioning     Independent   Dependent  X   Minimal Assist     Psychosocial High Risk Factors (check all that apply)    Unable to obtain meds   Chronic illness/pain  X Chronic back pain   Substance abuse   Lack of Family Support X  Financial stress X  Isolation X  Inadequate Community Resources  Suicide attempt(s)  Not taking medications   Victim of crime X  Developmental Delay  Unable to manage personal needs  X  Age 65 or older   Homeless X  No transportation X  Readmission within 30 days  Unemployment  Traumatic Event    Psychiatric Advanced Directives: none reported     Family to Involve in Treatment: Patient's sister Thao is supportive and involved in her care     Sexual Orientation:  JADE    Patient Strengths: insurance, linked with outpatient treatment     Patient Barriers: presenting on admission with suicidal ideation, homeless    Opiate Education Provided: N/A Patient denies and does not have a documented history of Opiate or Heroin use/abuse. Patient denies any Illicit drug use and her drug screen upon admission was negative for all substances     CMHC/mental health history: Linked with [facility], [place],  [phone], currently homeless, is from [facility], went to a  shelter in [place], reports she was there for a month, then they sent her to [facility] and they wouldn't accept her, sent here from [facility], unsure of discharge plan     Plan of Care   medication management, group/individual therapies, family meetings, psycho -education, treatment team meetings to assist with stabilization    Initial Discharge Plan:  To Be Determined Patient will be provided with resource information.       Clinical Summary:  Patient is a 64 year old female presenting to the ED voluntary via self for complaints of chest pain, dizziness, and being suicidal.     Patient states she is here for chest pain. Patient

## 2024-06-14 NOTE — BH NOTE
Best practice advisory for suicide precautions popped up for patient. On call provider notified and states that q15min safety rounding on patient is sufficient. Order for suicidal precautions discontinued.

## 2024-06-14 NOTE — ED NOTES
Patient resting on cart with eyes closed and respirations easy, sitter remains at bedside.   No needs voiced.

## 2024-06-14 NOTE — PROGRESS NOTES
Handover from kwesi Patel. Pt to be transferred around noon to [facility].

## 2024-06-14 NOTE — ED NOTES
Resting in bed. Lights dimmed. Sister at bedside. Voiced no concerns. Sitter at door. Will monitor

## 2024-06-14 NOTE — ED PROVIDER NOTES
person, place, and time.           Labs Reviewed   CBC - Abnormal; Notable for the following components:       Result Value    RBC 4.04 (*)     Hematocrit 35.1 (*)     All other components within normal limits   SALICYLATE LEVEL - Abnormal; Notable for the following components:    Salicylate Lvl < 0.3 (*)     All other components within normal limits   HEPATIC FUNCTION PANEL - Abnormal; Notable for the following components:    Total Bilirubin 0.2 (*)     All other components within normal limits   BASIC METABOLIC PANEL W/ REFLEX TO MG FOR LOW K - Abnormal; Notable for the following components:    Sodium 134 (*)     CO2 22 (*)     Glucose 410 (*)     All other components within normal limits   POCT GLUCOSE - Abnormal; Notable for the following components:    POC Glucose 429 (*)     All other components within normal limits   POCT GLUCOSE - Abnormal; Notable for the following components:    POC Glucose 388 (*)     All other components within normal limits   POCT GLUCOSE - Abnormal; Notable for the following components:    POC Glucose 308 (*)     All other components within normal limits   POCT GLUCOSE - Abnormal; Notable for the following components:    POC Glucose 294 (*)     All other components within normal limits   POCT GLUCOSE - Abnormal; Notable for the following components:    POC Glucose 170 (*)     All other components within normal limits   COVID-19 & INFLUENZA COMBO   ACETAMINOPHEN LEVEL   ETHANOL   LIPASE   URINE DRUG SCREEN   ANION GAP   GLOMERULAR FILTRATION RATE, ESTIMATED   OSMOLALITY   TROPONIN         Medications   insulin regular (HUMULIN R;NOVOLIN R) injection 5 Units (5 Units IntraVENous Given 6/13/24 2248)   potassium bicarb-citric acid (EFFER-K) effervescent tablet 50 mEq (50 mEq Oral Given 6/14/24 0108)   insulin regular (HUMULIN R;NOVOLIN R) injection 5 Units (5 Units IntraVENous Given 6/14/24 0110)         XR CHEST PORTABLE   Final Result   Small left pleural effusion.      This document has 
albuterol sulfate  (90 Base) MCG/ACT inhaler, Inhale 1 puff into the lungs every 6 hours as needed for Wheezing 12/03/19 medication list Pt has inhaler with her (locked in medication cabinet envelope # [id number]), Disp: , Rfl:       SOCIAL HISTORY     Social History     Social History Narrative    Not on file     Social History     Tobacco Use    Smoking status: Former     Current packs/day: 0.50     Average packs/day: 0.5 packs/day for 1 year (0.5 ttl pk-yrs)     Types: Cigarettes    Smokeless tobacco: Never   Vaping Use    Vaping Use: Unknown   Substance Use Topics    Alcohol use: Not Currently     Alcohol/week: 6.0 standard drinks of alcohol     Types: 6 Cans of beer per week     Comment: last used alcohol 6/12/2016    Drug use: Not Currently     Comment: reports 4 months sober         ALLERGIES     Allergies   Allergen Reactions    Codeine Nausea And Vomiting    Morphine Nausea And Vomiting         FAMILY HISTORY     Family History   Problem Relation Age of Onset    Diabetes Mother     Heart Disease Father     Mental Illness Father          PHYSICAL EXAM     ED Triage Vitals   BP Temp Temp Source Pulse Respirations SpO2 Height Weight - Scale   06/13/24 1749 06/13/24 1750 06/13/24 1750 06/13/24 1749 06/13/24 1752 06/13/24 1749 -- 06/13/24 1749   (!) 148/73 97.6 °F (36.4 °C) Oral 85 22 95 %  131.5 kg (290 lb)         Additional Vital Signs:  Vitals:    06/13/24 2017   BP: 133/62   Pulse: 73   Resp: 20   Temp:    SpO2: 93%       Physical Exam  Vitals reviewed.   Constitutional:       Appearance: Normal appearance. She is obese.   HENT:      Head: Normocephalic and atraumatic.      Right Ear: External ear normal.      Left Ear: External ear normal.      Nose: Nose normal.   Eyes:      Conjunctiva/sclera: Conjunctivae normal.   Cardiovascular:      Rate and Rhythm: Regular rhythm.   Pulmonary:      Effort: Pulmonary effort is normal.      Breath sounds: Normal breath sounds.   Abdominal:      General: Abdomen is

## 2024-06-14 NOTE — PROGRESS NOTES
Consulted with Dr. Dejesus concerning the mental status of patient. Patient is referred to inpatient care for mental health/substance use treatment.   Patient will be a transfer out due to no bed availability.   Patient/sister and ER staff updated on plan of care.   21:30 Verified patient was mobile and able to complete ADL's.     21:32 Referral information is provided to [facility].  22:00 Social work report given to [facility] staff. [facility] requesting glucose be 'below 250'. Charge Nurse Rasheed updated on request. Also [facility] request EMC be written when patient is accepted for 'safe transport'.     02:31 updated [facility] that BS is 170. They will reach out to [facility] for fax information.     03:31 Copy of EMC is faxed to [facility].     03:59 Patient is accepted to the care of Dr. [facility]  Room 207  N to N is [phone]  ER staff updated on patient status.

## 2024-06-14 NOTE — ED NOTES
Patient continues resting on cart with eyes closed and respirations easy.  Arouses easily to verbal stimuli.  Declines breakfast tray.  No needs voiced.  Sitter remains at bedside.
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
## 2024-06-15 LAB
BNP SERPL-MCNC: 184 PG/ML
EST. AVERAGE GLUCOSE BLD GHB EST-MCNC: 237 MG/DL
GLUCOSE BLD-MCNC: 171 MG/DL (ref 65–105)
GLUCOSE BLD-MCNC: 263 MG/DL (ref 65–105)
GLUCOSE BLD-MCNC: 284 MG/DL (ref 65–105)
HBA1C MFR BLD: 9.9 % (ref 4–6)

## 2024-06-15 PROCEDURE — 83036 HEMOGLOBIN GLYCOSYLATED A1C: CPT

## 2024-06-15 PROCEDURE — 6370000000 HC RX 637 (ALT 250 FOR IP): Performed by: NURSE PRACTITIONER

## 2024-06-15 PROCEDURE — 6370000000 HC RX 637 (ALT 250 FOR IP): Performed by: INTERNAL MEDICINE

## 2024-06-15 PROCEDURE — 99232 SBSQ HOSP IP/OBS MODERATE 35: CPT | Performed by: NURSE PRACTITIONER

## 2024-06-15 PROCEDURE — 36415 COLL VENOUS BLD VENIPUNCTURE: CPT

## 2024-06-15 PROCEDURE — 82947 ASSAY GLUCOSE BLOOD QUANT: CPT

## 2024-06-15 PROCEDURE — 1240000000 HC EMOTIONAL WELLNESS R&B

## 2024-06-15 PROCEDURE — 99232 SBSQ HOSP IP/OBS MODERATE 35: CPT | Performed by: INTERNAL MEDICINE

## 2024-06-15 PROCEDURE — 83880 ASSAY OF NATRIURETIC PEPTIDE: CPT

## 2024-06-15 PROCEDURE — 99222 1ST HOSP IP/OBS MODERATE 55: CPT | Performed by: INTERNAL MEDICINE

## 2024-06-15 RX ORDER — INSULIN GLARGINE 100 [IU]/ML
7 INJECTION, SOLUTION SUBCUTANEOUS NIGHTLY
Status: DISCONTINUED | OUTPATIENT
Start: 2024-06-15 | End: 2024-06-16

## 2024-06-15 RX ORDER — ISOSORBIDE MONONITRATE 30 MG/1
30 TABLET, EXTENDED RELEASE ORAL DAILY
Status: DISCONTINUED | OUTPATIENT
Start: 2024-06-15 | End: 2024-06-20 | Stop reason: HOSPADM

## 2024-06-15 RX ORDER — ASPIRIN 81 MG/1
81 TABLET ORAL DAILY
Status: DISCONTINUED | OUTPATIENT
Start: 2024-06-15 | End: 2024-06-20 | Stop reason: HOSPADM

## 2024-06-15 RX ADMIN — BUDESONIDE AND FORMOTEROL FUMARATE DIHYDRATE 2 PUFF: 160; 4.5 AEROSOL RESPIRATORY (INHALATION) at 20:59

## 2024-06-15 RX ADMIN — METOPROLOL TARTRATE 25 MG: 25 TABLET, FILM COATED ORAL at 08:47

## 2024-06-15 RX ADMIN — ISOSORBIDE MONONITRATE 30 MG: 30 TABLET, EXTENDED RELEASE ORAL at 15:03

## 2024-06-15 RX ADMIN — INSULIN GLARGINE 7 UNITS: 100 INJECTION, SOLUTION SUBCUTANEOUS at 20:58

## 2024-06-15 RX ADMIN — LISINOPRIL 5 MG: 5 TABLET ORAL at 08:46

## 2024-06-15 RX ADMIN — QUETIAPINE FUMARATE 100 MG: 100 TABLET ORAL at 20:59

## 2024-06-15 RX ADMIN — FUROSEMIDE 20 MG: 20 TABLET ORAL at 08:46

## 2024-06-15 RX ADMIN — INSULIN LISPRO 4 UNITS: 100 INJECTION, SOLUTION INTRAVENOUS; SUBCUTANEOUS at 08:46

## 2024-06-15 RX ADMIN — GLIPIZIDE 5 MG: 5 TABLET ORAL at 17:58

## 2024-06-15 RX ADMIN — GABAPENTIN 400 MG: 400 CAPSULE ORAL at 08:46

## 2024-06-15 RX ADMIN — ARIPIPRAZOLE 15 MG: 15 TABLET ORAL at 08:47

## 2024-06-15 RX ADMIN — CLONAZEPAM 0.5 MG: 0.5 TABLET ORAL at 15:04

## 2024-06-15 RX ADMIN — GABAPENTIN 400 MG: 400 CAPSULE ORAL at 20:59

## 2024-06-15 RX ADMIN — METOPROLOL TARTRATE 25 MG: 25 TABLET, FILM COATED ORAL at 20:59

## 2024-06-15 RX ADMIN — OXYCODONE HYDROCHLORIDE AND ACETAMINOPHEN 500 MG: 500 TABLET ORAL at 08:47

## 2024-06-15 RX ADMIN — SERTRALINE 100 MG: 100 TABLET, FILM COATED ORAL at 08:46

## 2024-06-15 RX ADMIN — Medication 1 TABLET: at 08:46

## 2024-06-15 RX ADMIN — ATORVASTATIN CALCIUM 20 MG: 20 TABLET, FILM COATED ORAL at 08:47

## 2024-06-15 RX ADMIN — Medication 1000 UNITS: at 08:46

## 2024-06-15 RX ADMIN — ASPIRIN 81 MG: 81 TABLET, COATED ORAL at 15:03

## 2024-06-15 RX ADMIN — GABAPENTIN 400 MG: 400 CAPSULE ORAL at 14:27

## 2024-06-15 RX ADMIN — GLIPIZIDE 5 MG: 5 TABLET ORAL at 07:35

## 2024-06-15 RX ADMIN — INSULIN LISPRO 4 UNITS: 100 INJECTION, SOLUTION INTRAVENOUS; SUBCUTANEOUS at 14:31

## 2024-06-15 NOTE — PLAN OF CARE
Problem: Chronic Conditions and Co-morbidities  Goal: Patient's chronic conditions and co-morbidity symptoms are monitored and maintained or improved  Outcome: Progressing  Note: Patient is compliant with diet after staff education. Patient takes medications and orders and is followed my internal medicine and cardiology.     Problem: Pain  Goal: Verbalizes/displays adequate comfort level or baseline comfort level  6/15/2024 1606 by Maria Vincent LPN  Outcome: Progressing     Problem: Self Harm/Suicidality  Goal: Will have no self-injury during hospital stay  Description: INTERVENTIONS:  1.  Ensure constant observer at bedside with Q15M safety checks  2.  Maintain a safe environment  3.  Secure patient belongings  4.  Ensure family/visitors adhere to safety recommendations  5.  Ensure safety tray has been added to patient's diet order  6.  Every shift and PRN: Re-assess suicidal risk via Frequent Screener    6/15/2024 1606 by Maria Vincent LPN  Outcome: Progressing  Note: Patient denies thoughts of self harm this shift 15 min safety checks continue.      Problem: Depression  Goal: Will be euthymic at discharge  Description: INTERVENTIONS:  1. Administer medication as ordered  2. Provide emotional support via 1:1 interaction with staff  3. Encourage involvement in milieu/groups/activities  4. Monitor for social isolation  6/15/2024 1606 by Maria Vincent LPN  Outcome: Progressing  Note: Patient continues to admit to depression takes medications as ordered 15 min safety checks continue

## 2024-06-15 NOTE — CONSULTS
Abdomen:  No masses or tenderness  Bowel sounds present  Extremities:   No Cyanosis or Clubbing   Lower extremity edema: trace ankle  edema    Skin: Warm and dry  Neurological:  Not done     DATA:    Diagnostics:      EKG 6/13/24:  Normal sinus rhythm, normal ecg         Previous cardiac testing:     Stress test 03/2024 at [facility]   1. No pharmacologically induced reversible perfusion defects to suggest   ischemia   2. Ejection fraction of 69%   3. No focal wall motion abnormality       TTE 11/2023 reviewed from [facility]     Labs:     CBC:   Recent Labs     06/13/24  1845   WBC 9.5   HGB 12.0   HCT 35.1*        BMP:   Recent Labs     06/13/24  1845   *   K 3.9   CO2 22*   BUN 18   CREATININE 0.8   LABGLOM 83   GLUCOSE 410*     BNP: No results for input(s): \"BNP\" in the last 72 hours.  PT/INR: No results for input(s): \"PROTIME\", \"INR\" in the last 72 hours.  APTT:No results for input(s): \"APTT\" in the last 72 hours.  CARDIAC ENZYMES:No results for input(s): \"CKTOTAL\", \"CKMB\", \"CKMBINDEX\", \"TROPONINI\" in the last 72 hours.  FASTING LIPID PANEL:No results found for: \"HDL\", \"LDLDIRECT\", \"TRIG\"  LIVER PROFILE:  Recent Labs     06/13/24  1845   AST 13   ALT 12         IMPRESSION:    Intermittent atypical chest pain  Major depression with suicidal ideation   Essential hypertension  Type II DM  Hyperlipidemia   Schizoaffective disorder   COPD      PLAN:  Start imdur 30 mg qd   Add asa ec 81 mg qd   Continue lopressor and lipitor  Agree with po lasix 20 mg qd   No evidence of ACS from hx, ecg and troponins. Recent stress test from [facility] shows no evidence of ischemia or infarction. LVEF is normal.   No further inpatient cardiac testing indicated at this point of time  Cardiology to sign off. Kindly call with questions.       Discussed with patient and nursing.        Refugio Ya MD [facility]

## 2024-06-15 NOTE — GROUP NOTE
Group Therapy Note    Date: 6/15/2024    Group Start Time: 1330  Group End Time: 1400  Group Topic: Cognitive Skills    Pat Goode CTRS    Cognitive Skills Group Note        Date: Ingrid 15, 2024 Start Time: 1:30pm  End Time:  200 pm       Number of Participants in Group & Unit Census:  5/10    Topic: cognitive skills     Goal of Group: pt will demonstrate improved coping skills and improved interpersonal relationships       Comments:     Patient did not participate in Cognitive Skills group, despite staff encouragement and explanation of benefits.  Patient remain seclusive to self.  Q15 minute safety checks maintained for patient safety and will continue to encourage patient to attend unit programming.              Signature:  KIMI LANE

## 2024-06-15 NOTE — PLAN OF CARE
Problem: Depression  Goal: Will be euthymic at discharge  Description: INTERVENTIONS:  1. Administer medication as ordered  2. Provide emotional support via 1:1 interaction with staff  3. Encourage involvement in milieu/groups/activities  4. Monitor for social isolation  6/15/2024 0234 by Arcadio Mckeon RN  Outcome: Progressing     Problem: Pain  Goal: Verbalizes/displays adequate comfort level or baseline comfort level  Outcome: Progressing     Problem: Self Harm/Suicidality  Goal: Will have no self-injury during hospital stay  Description: INTERVENTIONS:  1.  Ensure constant observer at bedside with Q15M safety checks  2.  Maintain a safe environment  3.  Secure patient belongings  4.  Ensure family/visitors adhere to safety recommendations  5.  Ensure safety tray has been added to patient's diet order  6.  Every shift and PRN: Re-assess suicidal risk via Frequent Screener    6/15/2024 0234 by Arcadio Mckeon RN  Outcome: Progressing  Note: Patient denies thoughts to harm self stating \"not right now\". Patient agreeable to seek staff if those thoughts arise. Patient was cooperative with assessment, however, was flat and guarded. Patient was compliant with most scheduled medications and remains in control of behavior at this time. Patient was isolative to room this evening, pt encouraged to attend unit programming and socialize with peers. Patient is able to verbalize pain appropriately. Safe environment maintained, will continue to offer support.

## 2024-06-15 NOTE — GROUP NOTE
Group Therapy Note    Date: 6/15/2024    Group Start Time: 1010  Group End Time: 1100  Group Topic: Psychotherapy    [facility] Goldie Basurto MSW        Group Therapy Note    Attendees: 3/10     Patient was offered group therapy today but declined to participate despite encouragement from staff.  1:1 was offered.    Discipline Responsible: /Counselor      Signature:  NAEL Arizmendi

## 2024-06-16 ENCOUNTER — APPOINTMENT (OUTPATIENT)
Dept: GENERAL RADIOLOGY | Age: 64
DRG: 750 | End: 2024-06-16
Attending: PSYCHIATRY & NEUROLOGY
Payer: MEDICAID

## 2024-06-16 LAB
GLUCOSE BLD-MCNC: 188 MG/DL (ref 65–105)
GLUCOSE BLD-MCNC: 194 MG/DL (ref 65–105)
GLUCOSE BLD-MCNC: 204 MG/DL (ref 65–105)
GLUCOSE BLD-MCNC: 265 MG/DL (ref 65–105)
GLUCOSE BLD-MCNC: 304 MG/DL (ref 65–105)

## 2024-06-16 PROCEDURE — 6370000000 HC RX 637 (ALT 250 FOR IP)

## 2024-06-16 PROCEDURE — 6370000000 HC RX 637 (ALT 250 FOR IP): Performed by: INTERNAL MEDICINE

## 2024-06-16 PROCEDURE — 72100 X-RAY EXAM L-S SPINE 2/3 VWS: CPT

## 2024-06-16 PROCEDURE — 99254 IP/OBS CNSLTJ NEW/EST MOD 60: CPT | Performed by: INTERNAL MEDICINE

## 2024-06-16 PROCEDURE — 6370000000 HC RX 637 (ALT 250 FOR IP): Performed by: PSYCHIATRY & NEUROLOGY

## 2024-06-16 PROCEDURE — 1240000000 HC EMOTIONAL WELLNESS R&B

## 2024-06-16 PROCEDURE — 6370000000 HC RX 637 (ALT 250 FOR IP): Performed by: NURSE PRACTITIONER

## 2024-06-16 PROCEDURE — 99232 SBSQ HOSP IP/OBS MODERATE 35: CPT | Performed by: NURSE PRACTITIONER

## 2024-06-16 RX ORDER — INSULIN GLARGINE 100 [IU]/ML
10 INJECTION, SOLUTION SUBCUTANEOUS NIGHTLY
Status: DISCONTINUED | OUTPATIENT
Start: 2024-06-16 | End: 2024-06-17

## 2024-06-16 RX ORDER — POLYETHYLENE GLYCOL 3350 17 G
2 POWDER IN PACKET (EA) ORAL
Status: DISCONTINUED | OUTPATIENT
Start: 2024-06-16 | End: 2024-06-20 | Stop reason: HOSPADM

## 2024-06-16 RX ADMIN — ASPIRIN 81 MG: 81 TABLET, COATED ORAL at 10:07

## 2024-06-16 RX ADMIN — INSULIN LISPRO 6 UNITS: 100 INJECTION, SOLUTION INTRAVENOUS; SUBCUTANEOUS at 12:04

## 2024-06-16 RX ADMIN — NICOTINE POLACRILEX 2 MG: 2 LOZENGE ORAL at 14:45

## 2024-06-16 RX ADMIN — HYDROXYZINE HYDROCHLORIDE 50 MG: 50 TABLET, FILM COATED ORAL at 17:37

## 2024-06-16 RX ADMIN — GLIPIZIDE 5 MG: 5 TABLET ORAL at 17:16

## 2024-06-16 RX ADMIN — METOPROLOL TARTRATE 25 MG: 25 TABLET, FILM COATED ORAL at 10:07

## 2024-06-16 RX ADMIN — GABAPENTIN 400 MG: 400 CAPSULE ORAL at 10:07

## 2024-06-16 RX ADMIN — ATORVASTATIN CALCIUM 20 MG: 20 TABLET, FILM COATED ORAL at 10:07

## 2024-06-16 RX ADMIN — BUDESONIDE AND FORMOTEROL FUMARATE DIHYDRATE 2 PUFF: 160; 4.5 AEROSOL RESPIRATORY (INHALATION) at 10:07

## 2024-06-16 RX ADMIN — GABAPENTIN 400 MG: 400 CAPSULE ORAL at 21:28

## 2024-06-16 RX ADMIN — INSULIN LISPRO 2 UNITS: 100 INJECTION, SOLUTION INTRAVENOUS; SUBCUTANEOUS at 17:16

## 2024-06-16 RX ADMIN — ACETAMINOPHEN 650 MG: 325 TABLET ORAL at 11:21

## 2024-06-16 RX ADMIN — Medication 1000 UNITS: at 10:07

## 2024-06-16 RX ADMIN — SERTRALINE HYDROCHLORIDE 150 MG: 100 TABLET ORAL at 10:07

## 2024-06-16 RX ADMIN — QUETIAPINE FUMARATE 100 MG: 100 TABLET ORAL at 21:28

## 2024-06-16 RX ADMIN — GLIPIZIDE 5 MG: 5 TABLET ORAL at 06:48

## 2024-06-16 RX ADMIN — ISOSORBIDE MONONITRATE 30 MG: 30 TABLET, EXTENDED RELEASE ORAL at 10:07

## 2024-06-16 RX ADMIN — IBUPROFEN 400 MG: 400 TABLET, FILM COATED ORAL at 15:19

## 2024-06-16 RX ADMIN — NICOTINE POLACRILEX 2 MG: 2 LOZENGE ORAL at 11:34

## 2024-06-16 RX ADMIN — INSULIN GLARGINE 10 UNITS: 100 INJECTION, SOLUTION SUBCUTANEOUS at 21:27

## 2024-06-16 RX ADMIN — BUDESONIDE AND FORMOTEROL FUMARATE DIHYDRATE 2 PUFF: 160; 4.5 AEROSOL RESPIRATORY (INHALATION) at 21:29

## 2024-06-16 RX ADMIN — LISINOPRIL 5 MG: 5 TABLET ORAL at 10:07

## 2024-06-16 RX ADMIN — FUROSEMIDE 20 MG: 20 TABLET ORAL at 10:07

## 2024-06-16 RX ADMIN — Medication 1 TABLET: at 10:07

## 2024-06-16 RX ADMIN — CLONAZEPAM 0.5 MG: 0.5 TABLET ORAL at 10:07

## 2024-06-16 RX ADMIN — ARIPIPRAZOLE 15 MG: 15 TABLET ORAL at 10:07

## 2024-06-16 RX ADMIN — IBUPROFEN 400 MG: 400 TABLET, FILM COATED ORAL at 21:28

## 2024-06-16 RX ADMIN — GABAPENTIN 400 MG: 400 CAPSULE ORAL at 14:45

## 2024-06-16 ASSESSMENT — PAIN DESCRIPTION - ORIENTATION: ORIENTATION: LOWER

## 2024-06-16 ASSESSMENT — PAIN DESCRIPTION - LOCATION
LOCATION: BACK
LOCATION: BACK

## 2024-06-16 ASSESSMENT — PAIN SCALES - GENERAL
PAINLEVEL_OUTOF10: 4
PAINLEVEL_OUTOF10: 3
PAINLEVEL_OUTOF10: 5

## 2024-06-16 ASSESSMENT — PAIN DESCRIPTION - DESCRIPTORS
DESCRIPTORS: ACHING;DISCOMFORT
DESCRIPTORS: ACHING;DISCOMFORT;SHARP

## 2024-06-16 NOTE — PLAN OF CARE
Problem: Chronic Conditions and Co-morbidities  Goal: Patient's chronic conditions and co-morbidity symptoms are monitored and maintained or improved  6/16/2024 0553 by Meena Cintron RN  Outcome: Progressing  6/15/2024 1606 by Maria Vincent LPN  Outcome: Progressing  Note: Patient is compliant with diet after staff education. Patient takes medications and orders and is followed my internal medicine and cardiology.     Problem: Pain  Goal: Verbalizes/displays adequate comfort level or baseline comfort level  6/16/2024 0553 by Meena Cintron RN  Outcome: Progressing  6/15/2024 1606 by Maria Vincent LPN  Outcome: Progressing     Problem: Self Harm/Suicidality  Goal: Will have no self-injury during hospital stay  Description: INTERVENTIONS:  1.  Ensure constant observer at bedside with Q15M safety checks  2.  Maintain a safe environment  3.  Secure patient belongings  4.  Ensure family/visitors adhere to safety recommendations  5.  Ensure safety tray has been added to patient's diet order  6.  Every shift and PRN: Re-assess suicidal risk via Frequent Screener    6/16/2024 0553 by Meena Cintron RN  Outcome: Progressing  Flowsheets (Taken 6/15/2024 2326)  Will have no self-injury during hospital stay:   Maintain a safe environment   Secure patient belongings   Every shift and PRN: Re-assess suicidal risk via Frequent Screener  6/15/2024 1606 by Maria Vincent LPN  Outcome: Progressing  Note: Patient denies thoughts of self harm this shift 15 min safety checks continue.      Problem: Depression  Goal: Will be euthymic at discharge  Description: INTERVENTIONS:  1. Administer medication as ordered  2. Provide emotional support via 1:1 interaction with staff  3. Encourage involvement in milieu/groups/activities  4. Monitor for social isolation  6/16/2024 0553 by Meena Cintron RN  Outcome: Progressing  6/15/2024 1606 by Maria Vincent LPN  Outcome: Progressing  Note: Patient continues to admit to depression

## 2024-06-16 NOTE — GROUP NOTE
Group Therapy Note    Date: 6/16/2024    Group Start Time: 1000  Group End Time: 1030  Group Topic: Cognitive Skills    Pat Goode CTRS        Group Therapy Note    Attendees: 5/12       Patient's Goal:  pt will demonstrate improved coping skills and improved concentration     Notes:   pt was pleasant and participated well    Status After Intervention:  Improved    Participation Level: Active Listener and Interactive    Participation Quality: Appropriate and Supportive      Speech:  normal      Thought Process/Content: Logical      Affective Functioning: Congruent      Mood: euthymic      Level of consciousness:  Alert      Response to Learning: Able to verbalize current knowledge/experience, Capable of insight, and Progressing to goal      Endings: None Reported    Modes of Intervention: Education, Support, and Activity      Discipline Responsible: Psychoeducational Specialist      Signature:  KIMI LANE

## 2024-06-16 NOTE — PLAN OF CARE
Pt denied thoughts of SI/HI/self-harm and agreed to seek out staff should thoughts of SI/HI/self-harm arise. Safe environment maintained. Q15 minute checks for safety continued per unit policy. Will continue to monitor for safety and provide support and reassurance as needed.  Patient has been out in the dayroom but aloof of peers..Pt encouraged to explore coping skills for reducing anxiety. None verbalized at this time.  Chronic conditions/ Co- morbidities are monitored and  treated per treatment plan.    Problem: Chronic Conditions and Co-morbidities  Goal: Patient's chronic conditions and co-morbidity symptoms are monitored and maintained or improved  6/16/2024 1749 by Meena Jade LPN  Outcome: Progressing     Problem: Pain  Goal: Verbalizes/displays adequate comfort level or baseline comfort level  6/16/2024 1749 by Meena Jade LPN  Outcome: Progressing     Problem: Self Harm/Suicidality  Goal: Will have no self-injury during hospital stay  Description: INTERVENTIONS:  1.  Ensure constant observer at bedside with Q15M safety checks  2.  Maintain a safe environment  3.  Secure patient belongings  4.  Ensure family/visitors adhere to safety recommendations  5.  Ensure safety tray has been added to patient's diet order  6.  Every shift and PRN: Re-assess suicidal risk via Frequent Screener    6/16/2024 1749 by Meena Jade LPN  Outcome: Progressing     Problem: Depression  Goal: Will be euthymic at discharge  Description: INTERVENTIONS:  1. Administer medication as ordered  2. Provide emotional support via 1:1 interaction with staff  3. Encourage involvement in milieu/groups/activities  4. Monitor for social isolation  6/16/2024 1749 by Meena Jade LPN  Outcome: Progressing     Problem: Safety - Adult  Goal: Free from fall injury  6/16/2024 1749 by Meena Jade LPN  Outcome: Progressing
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
## 2024-06-17 LAB
GLUCOSE BLD-MCNC: 172 MG/DL (ref 65–105)
GLUCOSE BLD-MCNC: 195 MG/DL (ref 65–105)
GLUCOSE BLD-MCNC: 265 MG/DL (ref 65–105)
GLUCOSE BLD-MCNC: 282 MG/DL (ref 65–105)

## 2024-06-17 PROCEDURE — 99232 SBSQ HOSP IP/OBS MODERATE 35: CPT | Performed by: INTERNAL MEDICINE

## 2024-06-17 PROCEDURE — 6370000000 HC RX 637 (ALT 250 FOR IP): Performed by: NURSE PRACTITIONER

## 2024-06-17 PROCEDURE — 6370000000 HC RX 637 (ALT 250 FOR IP)

## 2024-06-17 PROCEDURE — 6370000000 HC RX 637 (ALT 250 FOR IP): Performed by: PSYCHIATRY & NEUROLOGY

## 2024-06-17 PROCEDURE — 99232 SBSQ HOSP IP/OBS MODERATE 35: CPT | Performed by: PSYCHIATRY & NEUROLOGY

## 2024-06-17 PROCEDURE — 6370000000 HC RX 637 (ALT 250 FOR IP): Performed by: INTERNAL MEDICINE

## 2024-06-17 PROCEDURE — 1240000000 HC EMOTIONAL WELLNESS R&B

## 2024-06-17 PROCEDURE — APPSS30 APP SPLIT SHARED TIME 16-30 MINUTES: Performed by: NURSE PRACTITIONER

## 2024-06-17 PROCEDURE — 82947 ASSAY GLUCOSE BLOOD QUANT: CPT

## 2024-06-17 RX ORDER — INSULIN GLARGINE 100 [IU]/ML
20 INJECTION, SOLUTION SUBCUTANEOUS NIGHTLY
Status: DISCONTINUED | OUTPATIENT
Start: 2024-06-17 | End: 2024-06-20 | Stop reason: HOSPADM

## 2024-06-17 RX ORDER — ARIPIPRAZOLE 5 MG/1
5 TABLET ORAL DAILY
Status: DISCONTINUED | OUTPATIENT
Start: 2024-06-18 | End: 2024-06-20 | Stop reason: HOSPADM

## 2024-06-17 RX ADMIN — LISINOPRIL 5 MG: 5 TABLET ORAL at 08:17

## 2024-06-17 RX ADMIN — NICOTINE POLACRILEX 2 MG: 2 LOZENGE ORAL at 07:32

## 2024-06-17 RX ADMIN — GABAPENTIN 400 MG: 400 CAPSULE ORAL at 21:07

## 2024-06-17 RX ADMIN — Medication 1 TABLET: at 08:17

## 2024-06-17 RX ADMIN — Medication 1000 UNITS: at 08:18

## 2024-06-17 RX ADMIN — GABAPENTIN 400 MG: 400 CAPSULE ORAL at 13:54

## 2024-06-17 RX ADMIN — GLIPIZIDE 5 MG: 5 TABLET ORAL at 08:18

## 2024-06-17 RX ADMIN — INSULIN GLARGINE 20 UNITS: 100 INJECTION, SOLUTION SUBCUTANEOUS at 21:07

## 2024-06-17 RX ADMIN — CLONAZEPAM 0.5 MG: 0.5 TABLET ORAL at 18:33

## 2024-06-17 RX ADMIN — ARIPIPRAZOLE 15 MG: 15 TABLET ORAL at 08:16

## 2024-06-17 RX ADMIN — BUDESONIDE AND FORMOTEROL FUMARATE DIHYDRATE 2 PUFF: 160; 4.5 AEROSOL RESPIRATORY (INHALATION) at 08:16

## 2024-06-17 RX ADMIN — BUDESONIDE AND FORMOTEROL FUMARATE DIHYDRATE 2 PUFF: 160; 4.5 AEROSOL RESPIRATORY (INHALATION) at 21:08

## 2024-06-17 RX ADMIN — NICOTINE POLACRILEX 2 MG: 2 LOZENGE ORAL at 15:31

## 2024-06-17 RX ADMIN — ISOSORBIDE MONONITRATE 30 MG: 30 TABLET, EXTENDED RELEASE ORAL at 08:16

## 2024-06-17 RX ADMIN — NICOTINE POLACRILEX 2 MG: 2 LOZENGE ORAL at 10:19

## 2024-06-17 RX ADMIN — QUETIAPINE FUMARATE 100 MG: 100 TABLET ORAL at 21:07

## 2024-06-17 RX ADMIN — GABAPENTIN 400 MG: 400 CAPSULE ORAL at 08:16

## 2024-06-17 RX ADMIN — CLONAZEPAM 0.5 MG: 0.5 TABLET ORAL at 08:17

## 2024-06-17 RX ADMIN — METOPROLOL TARTRATE 25 MG: 25 TABLET, FILM COATED ORAL at 08:17

## 2024-06-17 RX ADMIN — ASPIRIN 81 MG: 81 TABLET, COATED ORAL at 08:17

## 2024-06-17 RX ADMIN — SERTRALINE HYDROCHLORIDE 150 MG: 100 TABLET ORAL at 08:16

## 2024-06-17 RX ADMIN — INSULIN LISPRO 4 UNITS: 100 INJECTION, SOLUTION INTRAVENOUS; SUBCUTANEOUS at 12:08

## 2024-06-17 RX ADMIN — IBUPROFEN 400 MG: 400 TABLET, FILM COATED ORAL at 21:07

## 2024-06-17 RX ADMIN — ATORVASTATIN CALCIUM 20 MG: 20 TABLET, FILM COATED ORAL at 08:18

## 2024-06-17 RX ADMIN — GLIPIZIDE 5 MG: 5 TABLET ORAL at 15:31

## 2024-06-17 RX ADMIN — FUROSEMIDE 20 MG: 20 TABLET ORAL at 08:17

## 2024-06-17 ASSESSMENT — PAIN DESCRIPTION - ORIENTATION: ORIENTATION: MID;LOWER

## 2024-06-17 ASSESSMENT — PAIN DESCRIPTION - LOCATION: LOCATION: BACK

## 2024-06-17 ASSESSMENT — PAIN SCALES - GENERAL: PAINLEVEL_OUTOF10: 7

## 2024-06-17 ASSESSMENT — PAIN DESCRIPTION - DESCRIPTORS: DESCRIPTORS: SHARP

## 2024-06-17 NOTE — GROUP NOTE
Group Therapy Note    Date: 6/17/2024    Group Start Time: 1100  Group End Time: 1140  Group Topic: Cognitive Skills    Addie Key CTRS        Group Therapy Note    Attendees: 5/12    Patient's Goal:  To improve interpersonal skills and memory recall through collaborating with peers and concentrate on a presented task.     Notes:  Patient attended and participated in group. Patient was able to collaborate with peers and concentrate on a presented task. Patient was pleasant and cooperative.     Status After Intervention:  Improved    Participation Level: Active Listener and Interactive    Participation Quality: Appropriate, Attentive and Sharing      Speech:  normal      Thought Process/Content: Logical and Linear      Affective Functioning: Congruent      Mood: euthymic      Level of consciousness:  Alert and Attentive      Response to Learning: Able to verbalize current knowledge/experience, Able to retain information, and Progressing to goal      Endings: None Reported    Modes of Intervention: Socialization, Exploration, and Activity      Discipline Responsible: Psychoeducational Specialist      Signature:  KIMI Esteban

## 2024-06-17 NOTE — PLAN OF CARE
Problem: Self Harm/Suicidality  Goal: Will have no self-injury during hospital stay  Description: INTERVENTIONS:  1.  Ensure constant observer at bedside with Q15M safety checks  2.  Maintain a safe environment  3.  Secure patient belongings  4.  Ensure family/visitors adhere to safety recommendations  5.  Ensure safety tray has been added to patient's diet order  6.  Every shift and PRN: Re-assess suicidal risk via Frequent Screener    6/16/2024 2252 by Airam Euceda LPN  Note: Patient reports fleeting suicidal ideation, she contracts for safety while on the unit. Patient feels safe on the unit and continues to explore alternative coping skills. Safety maintained 15 minute safety checks continue.      Problem: Depression  Goal: Will be euthymic at discharge  Description: INTERVENTIONS:  1. Administer medication as ordered  2. Provide emotional support via 1:1 interaction with staff  3. Encourage involvement in milieu/groups/activities  4. Monitor for social isolation  6/16/2024 2252 by Airam Euceda LPN  Note: Patient reports feelings of depression continue. Patient has been compliant with medications and care at this time. Patient reports sleep and appetite have been adequate. Patient has been isolative to self this shift.     Problem: Safety - Adult  Goal: Free from fall injury  6/16/2024 2252 by Airam Euceda LPN  Note: Patient is free from fall related injuries. Patient has non skid socks and her gait has been steady. Patient Is agreeable to call for assistance as needed.

## 2024-06-17 NOTE — PLAN OF CARE
Problem: Chronic Conditions and Co-morbidities  Goal: Patient's chronic conditions and co-morbidity symptoms are monitored and maintained or improved  Outcome: Progressing  Flowsheets (Taken 6/17/2024 1521)  Care Plan - Patient's Chronic Conditions and Co-Morbidity Symptoms are Monitored and Maintained or Improved:   Monitor and assess patient's chronic conditions and comorbid symptoms for stability, deterioration, or improvement   Collaborate with multidisciplinary team to address chronic and comorbid conditions and prevent exacerbation or deterioration   Update acute care plan with appropriate goals if chronic or comorbid symptoms are exacerbated and prevent overall improvement and discharge  Note: Patient has been compliant with her medications and cooperative with care.     Problem: Self Harm/Suicidality  Goal: Will have no self-injury during hospital stay  Description: INTERVENTIONS:  1.  Ensure constant observer at bedside with Q15M safety checks  2.  Maintain a safe environment  3.  Secure patient belongings  4.  Ensure family/visitors adhere to safety recommendations  5.  Ensure safety tray has been added to patient's diet order  6.  Every shift and PRN: Re-assess suicidal risk via Frequent Screener    Outcome: Progressing  Flowsheets (Taken 6/17/2024 1521)  Will have no self-injury during hospital stay:   Ensure constant observer at bedside with Q15M safety checks   Maintain a safe environment   Secure patient belongings   Ensure family/visitors adhere to safety recommendations   Ensure safety tray has been added to patient's diet order   Every shift and PRN: Re-assess suicidal risk via Frequent Screener  Note: No self harm noted this shift.  Patient agrees to seek staff out if negative thoughts arise.  Will continue to monitor Q15 minute and intermittently.       Problem: Depression  Goal: Will be euthymic at discharge  Description: INTERVENTIONS:  1. Administer medication as ordered  2. Provide

## 2024-06-17 NOTE — GROUP NOTE
Group Therapy Note    Date: 6/17/2024    Group Start Time: 0930  Group End Time: 0950  Group Topic: Community Meeting    [facility] Candi Roth LPN        Group Therapy Note    Attendees: 5/12       Patient's Goal:  not to think about SI    Status After Intervention:  Improved    Participation Level: Active Listener    Participation Quality: Appropriate      Speech:  normal      Thought Process/Content: Logical      Affective Functioning: Congruent      Mood: euthymic      Level of consciousness:  Alert, Oriented x4, and Attentive      Response to Learning: Progressing to goal      Endings: None Reported    Modes of Intervention: Support      Discipline Responsible: Licensed Practical Nurse      Signature:  Candi Reynolds LPN

## 2024-06-17 NOTE — GROUP NOTE
Group Therapy Note    Date: 6/17/2024    Group Start Time: 1430  Group End Time: 1500  Group Topic: Psychoeducation    Addie Kye CTRS        Group Therapy Note    Attendees: 3/11     Patient's Goal:  To improve interpersonal skills and coping skills awareness through collaborating with peers and demonstrating self-expression.    Notes:  Patient attended and participated in group. Patient was able to collaborate with peers and demonstrate self-expression. Patient was able to identify coping skills. Patient was pleasant and cooperative.     Status After Intervention:  Improved    Participation Level: Active Listener and Interactive    Participation Quality: Appropriate, Attentive, Sharing, and Supportive      Speech:  normal      Thought Process/Content: Logical and Linear      Affective Functioning: Congruent      Mood: euthymic      Level of consciousness:  Alert and Attentive      Response to Learning: Able to verbalize current knowledge/experience, Able to retain information, Capable of insight, and Progressing to goal      Endings: None Reported    Modes of Intervention: Education, Support, Socialization, and Exploration      Discipline Responsible: Psychoeducational Specialist      Signature:  KIMI Esteban

## 2024-06-18 LAB
GLUCOSE BLD-MCNC: 182 MG/DL (ref 65–105)
GLUCOSE BLD-MCNC: 193 MG/DL (ref 65–105)
GLUCOSE BLD-MCNC: 233 MG/DL (ref 65–105)
GLUCOSE BLD-MCNC: 257 MG/DL (ref 65–105)

## 2024-06-18 PROCEDURE — 99232 SBSQ HOSP IP/OBS MODERATE 35: CPT | Performed by: PSYCHIATRY & NEUROLOGY

## 2024-06-18 PROCEDURE — 6370000000 HC RX 637 (ALT 250 FOR IP)

## 2024-06-18 PROCEDURE — 6370000000 HC RX 637 (ALT 250 FOR IP): Performed by: INTERNAL MEDICINE

## 2024-06-18 PROCEDURE — APPSS30 APP SPLIT SHARED TIME 16-30 MINUTES: Performed by: NURSE PRACTITIONER

## 2024-06-18 PROCEDURE — 1240000000 HC EMOTIONAL WELLNESS R&B

## 2024-06-18 PROCEDURE — 6370000000 HC RX 637 (ALT 250 FOR IP): Performed by: NURSE PRACTITIONER

## 2024-06-18 PROCEDURE — 6370000000 HC RX 637 (ALT 250 FOR IP): Performed by: PSYCHIATRY & NEUROLOGY

## 2024-06-18 PROCEDURE — 82947 ASSAY GLUCOSE BLOOD QUANT: CPT

## 2024-06-18 PROCEDURE — 99232 SBSQ HOSP IP/OBS MODERATE 35: CPT | Performed by: INTERNAL MEDICINE

## 2024-06-18 RX ORDER — CYCLOBENZAPRINE HCL 10 MG
5 TABLET ORAL 3 TIMES DAILY PRN
Status: DISCONTINUED | OUTPATIENT
Start: 2024-06-18 | End: 2024-06-20 | Stop reason: HOSPADM

## 2024-06-18 RX ADMIN — CLONAZEPAM 0.5 MG: 0.5 TABLET ORAL at 11:05

## 2024-06-18 RX ADMIN — GLIPIZIDE 5 MG: 5 TABLET ORAL at 15:37

## 2024-06-18 RX ADMIN — METOPROLOL TARTRATE 25 MG: 25 TABLET, FILM COATED ORAL at 20:40

## 2024-06-18 RX ADMIN — ISOSORBIDE MONONITRATE 30 MG: 30 TABLET, EXTENDED RELEASE ORAL at 08:23

## 2024-06-18 RX ADMIN — INSULIN GLARGINE 20 UNITS: 100 INJECTION, SOLUTION SUBCUTANEOUS at 20:40

## 2024-06-18 RX ADMIN — LISINOPRIL 5 MG: 5 TABLET ORAL at 08:21

## 2024-06-18 RX ADMIN — HYDROXYZINE HYDROCHLORIDE 50 MG: 50 TABLET, FILM COATED ORAL at 16:37

## 2024-06-18 RX ADMIN — Medication 1000 UNITS: at 08:21

## 2024-06-18 RX ADMIN — GABAPENTIN 400 MG: 400 CAPSULE ORAL at 08:22

## 2024-06-18 RX ADMIN — OXYCODONE HYDROCHLORIDE AND ACETAMINOPHEN 500 MG: 500 TABLET ORAL at 08:20

## 2024-06-18 RX ADMIN — SERTRALINE HYDROCHLORIDE 150 MG: 100 TABLET ORAL at 08:22

## 2024-06-18 RX ADMIN — FUROSEMIDE 20 MG: 20 TABLET ORAL at 08:22

## 2024-06-18 RX ADMIN — GABAPENTIN 400 MG: 400 CAPSULE ORAL at 14:38

## 2024-06-18 RX ADMIN — GLIPIZIDE 5 MG: 5 TABLET ORAL at 08:24

## 2024-06-18 RX ADMIN — ATORVASTATIN CALCIUM 20 MG: 20 TABLET, FILM COATED ORAL at 08:22

## 2024-06-18 RX ADMIN — BUDESONIDE AND FORMOTEROL FUMARATE DIHYDRATE 2 PUFF: 160; 4.5 AEROSOL RESPIRATORY (INHALATION) at 20:40

## 2024-06-18 RX ADMIN — METOPROLOL TARTRATE 25 MG: 25 TABLET, FILM COATED ORAL at 08:22

## 2024-06-18 RX ADMIN — QUETIAPINE FUMARATE 100 MG: 100 TABLET ORAL at 20:40

## 2024-06-18 RX ADMIN — INSULIN LISPRO 2 UNITS: 100 INJECTION, SOLUTION INTRAVENOUS; SUBCUTANEOUS at 08:19

## 2024-06-18 RX ADMIN — ARIPIPRAZOLE 5 MG: 5 TABLET ORAL at 08:22

## 2024-06-18 RX ADMIN — Medication 1 TABLET: at 08:24

## 2024-06-18 RX ADMIN — ASPIRIN 81 MG: 81 TABLET, COATED ORAL at 08:21

## 2024-06-18 RX ADMIN — GABAPENTIN 400 MG: 400 CAPSULE ORAL at 20:40

## 2024-06-18 ASSESSMENT — PAIN SCALES - GENERAL: PAINLEVEL_OUTOF10: 5

## 2024-06-18 NOTE — CARE COORDINATION
Social work met with patient regarding discharge plans. Patient provided social work with the address of [address] which is the address of patient in room [address]. Social work educated the patient that this would not be a good discharge and encouraged patient to contact the [facility] in the [facility] area, patient did not wish to place any calls to the number provided. Patient is adamant that she is going to the address that she provided. Social work encouraged patient to complete the intake with the [facility], and patient was observed using the phone. Perfectservce was sent to the doctor regarding the patients discharge plan.

## 2024-06-18 NOTE — GROUP NOTE
Group Therapy Note    Date: 6/18/2024    Group Start Time: 1100  Group End Time: 1145  Group Topic: Cognitive Skills    Addie Key CTRS        Group Therapy Note    Attendees: 5/11    Cognitive Skills Group Note        Date: June 18, 2024       Start Time: 11am  End Time: 11:45am      Number of Participants in Group & Unit Census:  5/11    Topic:  interpersonal skills, decision-making, concentration     Goal of Group: To improve interpersonal skills and decision-making through collaborating with peers and concentrating on a presented task.       Comments:     Patient did not participate in Cognitive Skills group, despite staff encouragement and explanation of benefits.  Patient remain seclusive to self.  Q15 minute safety checks maintained for patient safety and will continue to encourage patient to attend unit programming.        Signature:  KIMI Esteban

## 2024-06-18 NOTE — GROUP NOTE
Group Therapy Note    Date: 6/18/2024    Group Start Time: 1000  Group End Time: 1045  Group Topic: Psychotherapy     Goldie Basurto MSW        Group Therapy Note    Attendees: 4/11     Patient was offered group therapy today but declined to participate despite encouragement from staff.  1:1 was offered.    Discipline Responsible: /Counselor      Signature:  NAEL Arizmendi
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
## 2024-06-18 NOTE — CARE COORDINATION
Social work spoke with Melissa at the [facility] in [place], [place] who shared they will accept the patient on Thursday and she needs to arrive by 11:00 am. Social work updated the doctor on patient being accepted.

## 2024-06-18 NOTE — GROUP NOTE
Group Refusal Note:       Patient refused to attend goals  group even after encouragement from staff.  will continue to monitor and support.

## 2024-06-18 NOTE — CARE COORDINATION
Patient shared that she was unable to contact to the [facility] and is focused on going to the other patients home at discharge. Social work educated the patient on this being an unsafe discharge plan and patient shared she wants to discharge to her sisters home in [place].    [Follow-Up] : a follow-up visit [Pulmonary Nodules] : pulmonary nodules

## 2024-06-18 NOTE — GROUP NOTE
Group Therapy Note    Date: 6/18/2024    Group Start Time: 1330  Group End Time: 1410  Group Topic: Psychoeducation    [facility] Addie Garza CTRS        Group Therapy Note    Attendees: 5/10       Patient's Goal:  To improve interpersonal skills and coping skills awareness through collaborating with peers and demonstrating self expression.    Notes:  Patient attended and participated in group. Patient was able to collaborate with peers and demonstrate self-expression. Patient was able to identify coping skills. Patient was pleasant and cooperative.     Status After Intervention:  Improved    Participation Level: Active Listener and Interactive    Participation Quality: Appropriate, Attentive, Sharing, and Supportive      Speech:  normal      Thought Process/Content: Logical and Linear      Affective Functioning: Constricted, brightened       Mood: Dysphoric        Level of consciousness:  Alert and Attentive      Response to Learning: Able to verbalize current knowledge/experience, Able to retain information, Capable of insight, and Progressing to goal      Endings: None Reported    Modes of Intervention: Education, Support, Socialization, and Exploration      Discipline Responsible: Psychoeducational Specialist      Signature:  KIMI Esteban

## 2024-06-18 NOTE — PLAN OF CARE
Problem: Chronic Conditions and Co-morbidities  Goal: Patient's chronic conditions and co-morbidity symptoms are monitored and maintained or improved  Outcome: Progressing     Problem: Pain  Goal: Verbalizes/displays adequate comfort level or baseline comfort level  Outcome: Progressing     Problem: Self Harm/Suicidality  Goal: Will have no self-injury during hospital stay  Description: INTERVENTIONS:  1.  Ensure constant observer at bedside with Q15M safety checks  2.  Maintain a safe environment  3.  Secure patient belongings  4.  Ensure family/visitors adhere to safety recommendations  5.  Ensure safety tray has been added to patient's diet order  6.  Every shift and PRN: Re-assess suicidal risk via Frequent Screener    6/18/2024 1022 by Ronn Crane RN  Outcome: Progressing     Problem: Depression  Goal: Will be euthymic at discharge  Description: INTERVENTIONS:  1. Administer medication as ordered  2. Provide emotional support via 1:1 interaction with staff  3. Encourage involvement in milieu/groups/activities  4. Monitor for social isolation  6/18/2024 1022 by Ronn Crane, RN  Outcome: Progressing     Problem: Safety - Adult  Goal: Free from fall injury  6/18/2024 1022 by Ronn Crane, RN  Outcome: Progressing   Patient endorses thoughts of suicide or self harm has no plan and contracts for safety. Denies any hallucinations, or delusions. Patient has slept well, eating well   General anxiety. Remains Isolative to room, attends no groups, mood depressive, withdrawn. Will continue to provide encouragement and support as needed. Safe environment maintained. Safety checks continued every 15 minutes.

## 2024-06-19 LAB
GLUCOSE BLD-MCNC: 175 MG/DL (ref 65–105)
GLUCOSE BLD-MCNC: 178 MG/DL (ref 65–105)
GLUCOSE BLD-MCNC: 185 MG/DL (ref 65–105)
GLUCOSE BLD-MCNC: 240 MG/DL (ref 65–105)

## 2024-06-19 PROCEDURE — 99232 SBSQ HOSP IP/OBS MODERATE 35: CPT | Performed by: PSYCHIATRY & NEUROLOGY

## 2024-06-19 PROCEDURE — 82947 ASSAY GLUCOSE BLOOD QUANT: CPT

## 2024-06-19 PROCEDURE — 6370000000 HC RX 637 (ALT 250 FOR IP): Performed by: PSYCHIATRY & NEUROLOGY

## 2024-06-19 PROCEDURE — 6370000000 HC RX 637 (ALT 250 FOR IP)

## 2024-06-19 PROCEDURE — 6370000000 HC RX 637 (ALT 250 FOR IP): Performed by: NURSE PRACTITIONER

## 2024-06-19 PROCEDURE — 1240000000 HC EMOTIONAL WELLNESS R&B

## 2024-06-19 PROCEDURE — APPSS30 APP SPLIT SHARED TIME 16-30 MINUTES: Performed by: NURSE PRACTITIONER

## 2024-06-19 PROCEDURE — 6370000000 HC RX 637 (ALT 250 FOR IP): Performed by: INTERNAL MEDICINE

## 2024-06-19 RX ORDER — LISINOPRIL 5 MG/1
5 TABLET ORAL DAILY
Qty: 30 TABLET | Refills: 3 | Status: SHIPPED | OUTPATIENT
Start: 2024-06-19

## 2024-06-19 RX ORDER — ATORVASTATIN CALCIUM 20 MG/1
20 TABLET, FILM COATED ORAL DAILY
Qty: 30 TABLET | Refills: 3 | Status: SHIPPED | OUTPATIENT
Start: 2024-06-19

## 2024-06-19 RX ORDER — M-VIT,TX,IRON,MINS/CALC/FOLIC 27MG-0.4MG
1 TABLET ORAL DAILY
Qty: 30 TABLET | Refills: 0 | Status: SHIPPED | OUTPATIENT
Start: 2024-06-20

## 2024-06-19 RX ORDER — CHOLECALCIFEROL (VITAMIN D3) 25 MCG
1000 TABLET ORAL DAILY
Qty: 60 TABLET | Refills: 0 | Status: SHIPPED | OUTPATIENT
Start: 2024-06-20

## 2024-06-19 RX ORDER — QUETIAPINE FUMARATE 100 MG/1
100 TABLET, FILM COATED ORAL NIGHTLY
Qty: 30 TABLET | Refills: 3 | Status: SHIPPED | OUTPATIENT
Start: 2024-06-19 | End: 2029-05-01

## 2024-06-19 RX ORDER — ARIPIPRAZOLE 5 MG/1
5 TABLET ORAL DAILY
Qty: 30 TABLET | Refills: 3 | Status: SHIPPED | OUTPATIENT
Start: 2024-06-20

## 2024-06-19 RX ORDER — FUROSEMIDE 20 MG/1
20 TABLET ORAL DAILY
Qty: 60 TABLET | Refills: 3 | Status: SHIPPED | OUTPATIENT
Start: 2024-06-20

## 2024-06-19 RX ORDER — INSULIN GLARGINE 100 [IU]/ML
20 INJECTION, SOLUTION SUBCUTANEOUS NIGHTLY
Qty: 10 ML | Refills: 3 | Status: SHIPPED | OUTPATIENT
Start: 2024-06-19

## 2024-06-19 RX ORDER — ASPIRIN 81 MG/1
81 TABLET ORAL DAILY
Qty: 30 TABLET | Refills: 3 | Status: SHIPPED | OUTPATIENT
Start: 2024-06-20

## 2024-06-19 RX ORDER — ISOSORBIDE MONONITRATE 30 MG/1
30 TABLET, EXTENDED RELEASE ORAL DAILY
Qty: 30 TABLET | Refills: 3 | Status: SHIPPED | OUTPATIENT
Start: 2024-06-20

## 2024-06-19 RX ADMIN — Medication 1 TABLET: at 08:04

## 2024-06-19 RX ADMIN — INSULIN GLARGINE 20 UNITS: 100 INJECTION, SOLUTION SUBCUTANEOUS at 20:49

## 2024-06-19 RX ADMIN — CLONAZEPAM 0.5 MG: 0.5 TABLET ORAL at 20:48

## 2024-06-19 RX ADMIN — OXYCODONE HYDROCHLORIDE AND ACETAMINOPHEN 500 MG: 500 TABLET ORAL at 08:04

## 2024-06-19 RX ADMIN — SERTRALINE HYDROCHLORIDE 150 MG: 100 TABLET ORAL at 08:04

## 2024-06-19 RX ADMIN — IBUPROFEN 400 MG: 400 TABLET, FILM COATED ORAL at 14:35

## 2024-06-19 RX ADMIN — BUDESONIDE AND FORMOTEROL FUMARATE DIHYDRATE 2 PUFF: 160; 4.5 AEROSOL RESPIRATORY (INHALATION) at 08:02

## 2024-06-19 RX ADMIN — QUETIAPINE FUMARATE 100 MG: 100 TABLET ORAL at 20:48

## 2024-06-19 RX ADMIN — ARIPIPRAZOLE 5 MG: 5 TABLET ORAL at 08:09

## 2024-06-19 RX ADMIN — ISOSORBIDE MONONITRATE 30 MG: 30 TABLET, EXTENDED RELEASE ORAL at 08:03

## 2024-06-19 RX ADMIN — GABAPENTIN 400 MG: 400 CAPSULE ORAL at 08:31

## 2024-06-19 RX ADMIN — Medication 1000 UNITS: at 08:04

## 2024-06-19 RX ADMIN — BUDESONIDE AND FORMOTEROL FUMARATE DIHYDRATE 2 PUFF: 160; 4.5 AEROSOL RESPIRATORY (INHALATION) at 20:49

## 2024-06-19 RX ADMIN — CLONAZEPAM 0.5 MG: 0.5 TABLET ORAL at 01:11

## 2024-06-19 RX ADMIN — GLIPIZIDE 5 MG: 5 TABLET ORAL at 15:47

## 2024-06-19 RX ADMIN — FUROSEMIDE 20 MG: 20 TABLET ORAL at 08:07

## 2024-06-19 RX ADMIN — GABAPENTIN 400 MG: 400 CAPSULE ORAL at 14:30

## 2024-06-19 RX ADMIN — CYCLOBENZAPRINE 5 MG: 10 TABLET, FILM COATED ORAL at 14:35

## 2024-06-19 RX ADMIN — LISINOPRIL 5 MG: 5 TABLET ORAL at 08:05

## 2024-06-19 RX ADMIN — ATORVASTATIN CALCIUM 20 MG: 20 TABLET, FILM COATED ORAL at 08:05

## 2024-06-19 RX ADMIN — CYCLOBENZAPRINE 5 MG: 10 TABLET, FILM COATED ORAL at 08:32

## 2024-06-19 RX ADMIN — GABAPENTIN 400 MG: 400 CAPSULE ORAL at 20:48

## 2024-06-19 RX ADMIN — METOPROLOL TARTRATE 25 MG: 25 TABLET, FILM COATED ORAL at 08:07

## 2024-06-19 RX ADMIN — GLIPIZIDE 5 MG: 5 TABLET ORAL at 08:04

## 2024-06-19 RX ADMIN — ASPIRIN 81 MG: 81 TABLET, COATED ORAL at 08:05

## 2024-06-19 ASSESSMENT — PAIN DESCRIPTION - DESCRIPTORS: DESCRIPTORS: ACHING;DISCOMFORT;SPASM

## 2024-06-19 ASSESSMENT — PAIN DESCRIPTION - ORIENTATION: ORIENTATION: LOWER

## 2024-06-19 ASSESSMENT — PAIN DESCRIPTION - LOCATION
LOCATION: BACK
LOCATION: BACK

## 2024-06-19 ASSESSMENT — PAIN SCALES - GENERAL: PAINLEVEL_OUTOF10: 5

## 2024-06-19 ASSESSMENT — PAIN - FUNCTIONAL ASSESSMENT: PAIN_FUNCTIONAL_ASSESSMENT: ACTIVITIES ARE NOT PREVENTED

## 2024-06-19 NOTE — CARE COORDINATION
Patient is discharging to the [facility] located at  [address] [place] [address]. Patient needs to arrive there by 11:00 am on Thursday 6/20/24.
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
## 2024-06-19 NOTE — PLAN OF CARE
Problem: Chronic Conditions and Co-morbidities  Goal: Patient's chronic conditions and co-morbidity symptoms are monitored and maintained or improved  Outcome: Progressing     Problem: Self Harm/Suicidality  Goal: Will have no self-injury during hospital stay  Description: INTERVENTIONS:  1.  Ensure constant observer at bedside with Q15M safety checks  2.  Maintain a safe environment  3.  Secure patient belongings  4.  Ensure family/visitors adhere to safety recommendations  5.  Ensure safety tray has been added to patient's diet order  6.  Every shift and PRN: Re-assess suicidal risk via Frequent Screener    6/19/2024 1028 by Lora Fuentes, RN  Outcome: Progressing     Problem: Depression  Goal: Will be euthymic at discharge  Description: INTERVENTIONS:  1. Administer medication as ordered  2. Provide emotional support via 1:1 interaction with staff  3. Encourage involvement in milieu/groups/activities  4. Monitor for social isolation  6/19/2024 1028 by Lora Fuentes RN  Outcome: Progressing     Problem: Safety - Adult  Goal: Free from fall injury  Outcome: Progressing     Problem: Depression  Goal: Will be euthymic at discharge  Description: INTERVENTIONS:  1. Administer medication as ordered  2. Provide emotional support via 1:1 interaction with staff  3. Encourage involvement in milieu/groups/activities  4. Monitor for social isolation  6/19/2024 1028 by Lora Fuentes RN  Outcome: Progressing

## 2024-06-19 NOTE — GROUP NOTE
Group Therapy Note    Date: 6/19/2024    Group Start Time: 1100  Group End Time: 1135  Group Topic: Cognitive Skills    [facility]        Group Therapy Note    Attendees: 6/l0       Patient's Goal:  pt will demonstrate improved coping skills and improved interpersonal relationships    Notes:   pt was pleasant and participated well    Status After Intervention:  Improved    Participation Level: Active Listener and Interactive    Participation Quality: Appropriate and Supportive      Speech:  normal      Thought Process/Content: Logical      Affective Functioning: Congruent      Mood: euthymic      Level of consciousness:  Alert      Response to Learning: Able to verbalize current knowledge/experience, Capable of insight, and Progressing to goal      Endings: None Reported    Modes of Intervention: Education, Support, and Activity      Discipline Responsible: Psychoeducational Specialist      Signature:  KIMI LANE

## 2024-06-19 NOTE — GROUP NOTE
Group Therapy Note    Date: 6/19/2024    Group Start Time: 1330  Group End Time: 1410  Group Topic: psycheducation group     [facility]    Pat Lucas CTRS        Group Therapy Note    Attendees: 6/8       Patient's Goal:  pt will demonstrate improved coping skills, improved communication and improved interpersonal relationships    Notes:   pt was pleasant and participated well    Status After Intervention:  Improved    Participation Level: Active Listener and Interactive    Participation Quality: Appropriate and Supportive      Speech:  normal      Thought Process/Content: Logical      Affective Functioning: Congruent      Mood: euthymic      Level of consciousness:  Alert      Response to Learning: Able to verbalize current knowledge/experience, Capable of insight, and Progressing to goal      Endings: None Reported    Modes of Intervention: Education, Support, and Activity      Discipline Responsible: Psychoeducational Specialist      Signature:  KIMI LANE

## 2024-06-19 NOTE — PLAN OF CARE
Problem: Chronic Conditions and Co-morbidities  Goal: Patient's chronic conditions and co-morbidity symptoms are monitored and maintained or improved  Outcome: Progressing     Problem: Self Harm/Suicidality  Goal: Will have no self-injury during hospital stay  Description: INTERVENTIONS:  1.  Ensure constant observer at bedside with Q15M safety checks  2.  Maintain a safe environment  3.  Secure patient belongings  4.  Ensure family/visitors adhere to safety recommendations  5.  Ensure safety tray has been added to patient's diet order  6.  Every shift and PRN: Re-assess suicidal risk via Frequent Screener    6/19/2024 1028 by Lora Fuentes, RN  Outcome: Progressing     Problem: Depression  Goal: Will be euthymic at discharge  Description: INTERVENTIONS:  1. Administer medication as ordered  2. Provide emotional support via 1:1 interaction with staff  3. Encourage involvement in milieu/groups/activities  4. Monitor for social isolation  6/19/2024 1028 by Lora Fuentes, RN  Outcome: Progressing     Problem: Safety - Adult  Goal: Free from fall injury  Outcome: Progressing

## 2024-06-19 NOTE — PLAN OF CARE
Problem: Depression  Goal: Will be euthymic at discharge  Description: INTERVENTIONS:  1. Administer medication as ordered  2. Provide emotional support via 1:1 interaction with staff  3. Encourage involvement in milieu/groups/activities  4. Monitor for social isolation  6/18/2024 2138 by Kevin Marley  Outcome: Not Progressing   Patient reports depression at this time. Patient is tearful while speaking with writer. Patient educated on coping mechanisms, encouraged patient to come out to the dayroom and interact with peers.   Problem: Self Harm/Suicidality  Goal: Will have no self-injury during hospital stay  Description: INTERVENTIONS:  1.  Ensure constant observer at bedside with Q15M safety checks  2.  Maintain a safe environment  3.  Secure patient belongings  4.  Ensure family/visitors adhere to safety recommendations  5.  Ensure safety tray has been added to patient's diet order  6.  Every shift and PRN: Re-assess suicidal risk via Frequent Screener    6/18/2024 2138 by Kevin Marley  Outcome: Progressing

## 2024-06-19 NOTE — BH NOTE
Spoke with [employer] [place] regarding discharge transportation for 6/20/2024. [facility] transportation will arrive tomorrow morning at 8:00 AM and will call unit number upon arrival. Medication in [employer] refrigerator. Patient will be transported to [facility] in [place], [place].     Trip #: [id number]

## 2024-06-19 NOTE — GROUP NOTE
Group Therapy Note    Date: 6/19/2024    Group Start Time: 1000  Group End Time: 1032  Group Topic: Psychotherapy    [facility]    [facility]iffer Flores MSW, JEREMIAH        Group Therapy Note    Attendees: 5/10       Patient's Goal:  Increase interpersonal relationship skills utilizing talk therapy while discussing positive coping skills for Depression.    Status After Intervention:  Improved    Participation Level: Active Listener and Interactive    Participation Quality: Attentive      Speech:  normal      Thought Process/Content: Logical      Affective Functioning: Congruent      Mood: irritable      Level of consciousness:  Alert and Attentive      Response to Learning: Able to verbalize current knowledge/experience, Able to verbalize/acknowledge new learning, and Able to retain information      Endings: None Reported    Modes of Intervention: Education, Support, and Socialization      Discipline Responsible: /Counselor      Signature:  NAEL Diaz LSW

## 2024-06-19 NOTE — CARE COORDINATION
Social work attempted to schedule patients discharge appointment with [facility], social work left message asking for return call.

## 2024-06-20 VITALS
RESPIRATION RATE: 18 BRPM | BODY MASS INDEX: 43.95 KG/M2 | HEART RATE: 80 BPM | TEMPERATURE: 96.8 F | HEIGHT: 68 IN | DIASTOLIC BLOOD PRESSURE: 76 MMHG | SYSTOLIC BLOOD PRESSURE: 127 MMHG | WEIGHT: 290 LBS | OXYGEN SATURATION: 97 %

## 2024-06-20 LAB — GLUCOSE BLD-MCNC: 187 MG/DL (ref 65–105)

## 2024-06-20 PROCEDURE — 6370000000 HC RX 637 (ALT 250 FOR IP): Performed by: INTERNAL MEDICINE

## 2024-06-20 PROCEDURE — 6370000000 HC RX 637 (ALT 250 FOR IP): Performed by: PSYCHIATRY & NEUROLOGY

## 2024-06-20 PROCEDURE — 6370000000 HC RX 637 (ALT 250 FOR IP): Performed by: NURSE PRACTITIONER

## 2024-06-20 PROCEDURE — 6370000000 HC RX 637 (ALT 250 FOR IP)

## 2024-06-20 PROCEDURE — 82947 ASSAY GLUCOSE BLOOD QUANT: CPT

## 2024-06-20 RX ADMIN — GLIPIZIDE 5 MG: 5 TABLET ORAL at 07:46

## 2024-06-20 RX ADMIN — ARIPIPRAZOLE 5 MG: 5 TABLET ORAL at 07:46

## 2024-06-20 RX ADMIN — CYCLOBENZAPRINE 5 MG: 10 TABLET, FILM COATED ORAL at 07:53

## 2024-06-20 RX ADMIN — GABAPENTIN 400 MG: 400 CAPSULE ORAL at 07:46

## 2024-06-20 RX ADMIN — IBUPROFEN 400 MG: 400 TABLET, FILM COATED ORAL at 07:53

## 2024-06-20 RX ADMIN — BUDESONIDE AND FORMOTEROL FUMARATE DIHYDRATE 2 PUFF: 160; 4.5 AEROSOL RESPIRATORY (INHALATION) at 07:48

## 2024-06-20 RX ADMIN — FUROSEMIDE 20 MG: 20 TABLET ORAL at 07:46

## 2024-06-20 RX ADMIN — ATORVASTATIN CALCIUM 20 MG: 20 TABLET, FILM COATED ORAL at 07:46

## 2024-06-20 RX ADMIN — LISINOPRIL 5 MG: 5 TABLET ORAL at 07:45

## 2024-06-20 RX ADMIN — METOPROLOL TARTRATE 25 MG: 25 TABLET, FILM COATED ORAL at 07:45

## 2024-06-20 RX ADMIN — CLONAZEPAM 0.5 MG: 0.5 TABLET ORAL at 07:53

## 2024-06-20 RX ADMIN — SERTRALINE HYDROCHLORIDE 150 MG: 100 TABLET ORAL at 07:45

## 2024-06-20 RX ADMIN — ISOSORBIDE MONONITRATE 30 MG: 30 TABLET, EXTENDED RELEASE ORAL at 07:46

## 2024-06-20 RX ADMIN — ASPIRIN 81 MG: 81 TABLET, COATED ORAL at 07:46

## 2024-06-20 RX ADMIN — Medication 1000 UNITS: at 07:46

## 2024-06-20 RX ADMIN — Medication 1 TABLET: at 07:45

## 2024-06-20 ASSESSMENT — PAIN SCALES - GENERAL: PAINLEVEL_OUTOF10: 7

## 2024-06-20 ASSESSMENT — PAIN DESCRIPTION - LOCATION: LOCATION: BACK

## 2024-06-20 NOTE — DISCHARGE INSTRUCTIONS
Information:  Medications:   Medication summary provided   I understand that I should take only the medications on my list.     -why and when I need to take each medicine.     -which side effects to watch for.     -that I should carry my medication information at all times in case of     Emergency situations.    I will take all of my medicines to follow up appointments.     -check with my physician or pharmacist before taking any new    Medication, over the counter product or drink alcohol.    -Ask about food, drug or dietary supplement interactions.    -discard old lists and update records with medication providers.    Notify Physician:  Notify physician if you notice:   Always call 911 if you feel your life is in danger  In case of an emergency call 911 immediately!  If 911 is not available call your local emergency medical system for help    Behavioral Health Follow Up:  Original Referral Source: [facility]  Discharge Diagnosis: Depression with suicidal ideation [F32.A, R45.851]  Recommendations for Level of Care: Community mental health follow-up  Patient status at discharge: Alert and oriented x4, calm and cooperative  My hospital  was: [facility]  Aftercare plan faxed: Yes   -faxed by: RN   -date: 6/20/2024   -time: 7:15  Prescriptions: Sent home with patient    Smoking: Quit Smoking.   Call the [employer]'s smoking quitline at 3-924-81O-QUIT  Know the signs of a heart attack   If you have any of the following symptoms call 911 immediately, do not wait more    Than five minutes.    1. Pressure, fullness and/ or squeezing in the center of the chest spreading to    The jaw, neck or shoulder.    2. Chest discomfort with light headedness, fainting, sweating, nausea or    Shortness of breath.   3. Upper abdominal pressure or discomfort.   4. Lower chest pain, back pain, unusual fatigue, shortness of breath, nausea   Or dizziness.     General Information:   Questions regarding your bill: Call HELP program ([phone])

## 2024-06-20 NOTE — PLAN OF CARE
Problem: Self Harm/Suicidality  Goal: Will have no self-injury during hospital stay  Description: INTERVENTIONS:  1.  Ensure constant observer at bedside with Q15M safety checks  2.  Maintain a safe environment  3.  Secure patient belongings  4.  Ensure family/visitors adhere to safety recommendations  5.  Ensure safety tray has been added to patient's diet order  6.  Every shift and PRN: Re-assess suicidal risk via Frequent Screener    6/19/2024 2148 by Cyndy Leija, RN  Outcome: Progressing   Patient denies having any suicidal thoughts at the present time.   Problem: Depression  Goal: Will be euthymic at discharge  Description: INTERVENTIONS:  1. Administer medication as ordered  2. Provide emotional support via 1:1 interaction with staff  3. Encourage involvement in milieu/groups/activities  4. Monitor for social isolation  6/19/2024 2148 by Cyndy Leija, RN  Outcome: Progressing   Patient denies being depressed at the current time.

## 2024-06-20 NOTE — BH NOTE
Patient ambulated to transportation with staff, discharging to [facility]. Patient is alert and oriented x4, calm and cooperative. All belongings from patient locker, bin, and security safe sent home with patient.

## 2024-06-20 NOTE — TRANSITION OF CARE
Behavioral Health Transition Record    Patient Name: Mervat Esposito  YOB: 1960   Medical Record Number: [id number]  Date of Admission: 2024  2:26 PM   Date of Discharge: 2024    Attending Provider: Lucian Hartley MD   Discharging Provider: Malcolm  To contact this individual call [phone] and ask the  to page.  If unavailable, ask to be transferred to Behavioral Health Provider on call.  A Behavioral Health Provider will be available on call  and during holidays.    Primary Care Provider: Sayra Ruiz APRN - NP    Allergies   Allergen Reactions    Codeine Nausea And Vomiting    Metformin And Related Swelling and Rash    Morphine Nausea And Vomiting       Reason for Admission: past medical history of schizoaffective disorder, alcohol abuse spinal stenosis and diabetes mellitus. Patient presented to the [facility] ED with chest pain and endorsing suicidal ideation.  Patient expressing regret and going to the hospital as she was hoping that she had a heart attack and would have preferred to have .  Also making statements of multiple plans to end her life.  Unable to contract for safety in the community.    Admission Diagnosis: Depression with suicidal ideation [F32.A, R45.851]    * No surgery found *    Results for orders placed or performed during the hospital encounter of 24   Brain Natriuretic Peptide   Result Value Ref Range    Pro- <300 pg/mL   Hemoglobin A1C   Result Value Ref Range    Hemoglobin A1C 9.9 (H) 4.0 - 6.0 %    Estimated Avg Glucose 237 mg/dL   POC Glucose Fingerstick   Result Value Ref Range    POC Glucose 283 (H) 65 - 105 mg/dL   POC Glucose Fingerstick   Result Value Ref Range    POC Glucose 263 (H) 65 - 105 mg/dL   POC Glucose Fingerstick   Result Value Ref Range    POC Glucose 171 (H) 65 - 105 mg/dL   POC Glucose Fingerstick   Result Value Ref Range    POC Glucose 284 (H) 65 - 105 mg/dL   POC Glucose Fingerstick   Result Value Ref Range

## 2024-06-20 NOTE — CARE COORDINATION
Name: Mervat Esposito    : 1960    Auth number: [id number]     Discharge Date: 24    Destination: [facility]    Discharge Medications:      Medication List        START taking these medications      aspirin 81 MG EC tablet  Take 1 tablet by mouth daily  Notes to patient: Heart     furosemide 20 MG tablet  Commonly known as: LASIX  Take 1 tablet by mouth daily  Notes to patient: Blood pressure     insulin glargine 100 UNIT/ML injection vial  Commonly known as: LANTUS  Inject 20 Units into the skin nightly  Notes to patient: Glucose control     isosorbide mononitrate 30 MG extended release tablet  Commonly known as: IMDUR  Take 1 tablet by mouth daily  Notes to patient: Chest pain     Vitamin D3 25 MCG Tabs  Take 1 tablet by mouth daily  Replaces: vitamin D 25 MCG (1000 UT) Caps  Notes to patient: Nutritional supplement            CHANGE how you take these medications      ARIPiprazole 5 MG tablet  Commonly known as: ABILIFY  Take 1 tablet by mouth daily  What changed:   medication strength  how much to take  Notes to patient: Mood     sertraline 50 MG tablet  Commonly known as: ZOLOFT  Take 3 tablets by mouth daily  What changed:   medication strength  how much to take  Notes to patient: Mood     therapeutic multivitamin-minerals tablet  Take 1 tablet by mouth daily  What changed: additional instructions  Notes to patient: Nutritional supplement            CONTINUE taking these medications      albuterol sulfate  (90 Base) MCG/ACT inhaler  Commonly known as: PROVENTIL;VENTOLIN;PROAIR  Notes to patient: Breathing     ascorbic acid 500 MG tablet  Commonly known as: VITAMIN C  Notes to patient: Nutritional supplement     atorvastatin 20 MG tablet  Commonly known as: LIPITOR  Take 1 tablet by mouth daily  Notes to patient: Cholesterol     gabapentin 400 MG capsule  Commonly known as: NEURONTIN  Notes to patient: Neuropathy     glimepiride 1 MG tablet  Commonly known as: AMARYL  Notes to patient: Glucose control

## (undated) DEVICE — PRESSURE MONITORING SET: Brand: TRUWAVE

## (undated) DEVICE — KIT EVAC 400CC PVC RADPQ Y CONN

## (undated) DEVICE — Device

## (undated) DEVICE — TOTAL TRAY, DB, 100% SILI FOLEY, 16FR 10: Brand: MEDLINE

## (undated) DEVICE — TUBING PRSS 36 M F

## (undated) DEVICE — THE MILL DISPOSABLE - MEDIUM

## (undated) DEVICE — LOOP VES W25MM THK1MM MAXI RED SIL FLD REPELLENT 100 PER

## (undated) DEVICE — SET CATH 20GA L1.75IN RAD ART POLYUR RADPQ W/ INTEGR

## (undated) DEVICE — DRAIN SURG 10FR PVC TB W/ TRCR MID PERF NO RESVR HUBLESS

## (undated) DEVICE — GLOVE ORANGE PI 7   MSG9070

## (undated) DEVICE — BUR RND FLUT AGRSV 5MM

## (undated) DEVICE — GAUZE,SPONGE,3"X3",12PLY,STERILE,LF,2'S: Brand: MEDLINE

## (undated) DEVICE — E-Z CLEAN, NON-STICK, PTFE COATED, ELECTROSURGICAL BLADE ELECTRODE, 6.5 INCH (16.5 CM): Brand: MEGADYNE

## (undated) DEVICE — SOLUTION IV 1000ML LAC RINGERS PH 6.5 INJ USP VIAFLX PLAS

## (undated) DEVICE — YANKAUER,BULB TIP,W/O VENT,RIGID,STERILE: Brand: MEDLINE

## (undated) DEVICE — SYRINGE IRRIG 60ML SFT PLIABLE BLB EZ TO GRP 1 HND USE W/

## (undated) DEVICE — GLOVE ORANGE PI 8 1/2   MSG9085

## (undated) DEVICE — DRESSING TRNSPAR W5XL4.5IN FLM SHT SEMIPERMEABLE WIND

## (undated) DEVICE — GLOVE SURG SZ 65 THK91MIL LTX FREE SYN POLYISOPRENE

## (undated) DEVICE — TAPE ADH W1INXL10YD PLAS TRNSPAR H2O RESIST HYPOALRG CURAD

## (undated) DEVICE — 1010 S-DRAPE TOWEL DRAPE 10/BX: Brand: STERI-DRAPE™

## (undated) DEVICE — PROBE STIM 3 MM FOR PEDCL SCREW DISP

## (undated) DEVICE — SUTURE VCRL SZ 2-0 L27IN ABSRB UD L26MM SH 1/2 CIR J417H

## (undated) DEVICE — STRIP SKIN CLSR W0.25XL4IN WHT SPUNBOUND FBR NYL HI ADH

## (undated) DEVICE — SET CATH 20GA L1.5IN RAD ART POLYUR RADPQ W/ INTEGR 0.018IN

## (undated) DEVICE — BIPOLAR SEALER 23-112-1 AQM 6.0: Brand: AQUAMANTYS ®

## (undated) DEVICE — SOLUTION IV 500ML 0.9% SOD CHL PH 5 INJ USP VIAFLX PLAS

## (undated) DEVICE — SUTURE PROL SZ 7-0 L24IN NONABSORBABLE BLU L9.3MM CC 3/8 8704H

## (undated) DEVICE — SPLINT ARMBRD W3XL10.5IN POLYFOAM DLX A LN

## (undated) DEVICE — GAUZE,SPONGE,8"X4",12PLY,XRAY,STRL,LF: Brand: MEDLINE

## (undated) DEVICE — CONNECTOR IV TB L28MM CLR VLV ACCS NDLLSS DISP MAXPLUS

## (undated) DEVICE — SOLUTION IV 1000ML 0.9% SOD CHL PH 5 INJ USP VIAFLX PLAS

## (undated) DEVICE — AEGIS 1" DISK 4MM HOLE, PEEL OPEN: Brand: MEDLINE

## (undated) DEVICE — JCKSON TBL POSTNER NO HD REST: Brand: MEDLINE INDUSTRIES, INC.

## (undated) DEVICE — 500ML,PRESSURE INFUSER W/STOPCOCK: Brand: MEDLINE

## (undated) DEVICE — LINE ASPIR 1/4 ANTICOAG

## (undated) DEVICE — SUTURE PERMA-HAND SZ 3-0 L30IN NONABSORBABLE BLK L60MM KS 622H

## (undated) DEVICE — CRADLE POS 3X5X24IN RASPBERRY ARM PRONE FOAM DISP

## (undated) DEVICE — BLADE ES ELASTOMERIC COAT INSUL DURABLE BEND UPTO 90DEG

## (undated) DEVICE — SUTURE MCRYL SZ 4-0 L27IN ABSRB UD L19MM PS-2 1/2 CIR PRIM Y426H

## (undated) DEVICE — CODMAN® SURGICAL PATTIES 1" X 1" (2.54CM X 2.54CM): Brand: CODMAN®

## (undated) DEVICE — KAIRISON TUBING SET PNEUMATIC, (3000 MM), STERILE, DISPOSABLE, TO BE USED WITH: FK898R, PACKAGE OF 10 PIECES: Brand: KAIRISON

## (undated) DEVICE — HEAD POSITIONER, SURGICAL: Brand: DEROYAL

## (undated) DEVICE — 3M™ STERI-STRIP™ COMPOUND BENZOIN TINCTURE 40 BAGS/CARTON 4 CARTONS/CASE C1544: Brand: 3M™ STERI-STRIP™

## (undated) DEVICE — TUBING, SUCTION, 1/4" X 20', STRAIGHT: Brand: MEDLINE INDUSTRIES, INC.